# Patient Record
Sex: FEMALE | Race: WHITE | NOT HISPANIC OR LATINO | Employment: OTHER | URBAN - METROPOLITAN AREA
[De-identification: names, ages, dates, MRNs, and addresses within clinical notes are randomized per-mention and may not be internally consistent; named-entity substitution may affect disease eponyms.]

---

## 2017-03-08 DIAGNOSIS — I10 ESSENTIAL HYPERTENSION: ICD-10-CM

## 2017-03-08 RX ORDER — CARVEDILOL 6.25 MG/1
TABLET ORAL
Qty: 180 TABLET | Refills: 3 | Status: SHIPPED | OUTPATIENT
Start: 2017-03-08 | End: 2018-03-06 | Stop reason: SDUPTHER

## 2017-04-03 RX ORDER — AMLODIPINE BESYLATE 10 MG/1
TABLET ORAL
Qty: 90 TABLET | Refills: 0 | Status: SHIPPED | OUTPATIENT
Start: 2017-04-03 | End: 2017-06-27 | Stop reason: SDUPTHER

## 2017-06-27 RX ORDER — AMLODIPINE BESYLATE 10 MG/1
TABLET ORAL
Qty: 90 TABLET | Refills: 0 | Status: SHIPPED | OUTPATIENT
Start: 2017-06-27 | End: 2017-09-19 | Stop reason: SDUPTHER

## 2017-09-20 RX ORDER — AMLODIPINE BESYLATE 10 MG/1
TABLET ORAL
Qty: 90 TABLET | Refills: 3 | Status: SHIPPED | OUTPATIENT
Start: 2017-09-20 | End: 2018-10-10 | Stop reason: SDUPTHER

## 2017-10-25 ENCOUNTER — APPOINTMENT (OUTPATIENT)
Dept: WOMENS IMAGING | Facility: HOSPITAL | Age: 67
End: 2017-10-25

## 2017-10-25 PROCEDURE — MDREVIEWSP: Performed by: RADIOLOGY

## 2017-10-25 PROCEDURE — 77063 BREAST TOMOSYNTHESIS BI: CPT | Performed by: RADIOLOGY

## 2017-10-25 PROCEDURE — G0202 SCR MAMMO BI INCL CAD: HCPCS | Performed by: RADIOLOGY

## 2017-10-31 ENCOUNTER — OFFICE VISIT (OUTPATIENT)
Dept: CARDIOLOGY | Facility: CLINIC | Age: 67
End: 2017-10-31

## 2017-10-31 VITALS
OXYGEN SATURATION: 100 % | BODY MASS INDEX: 41.32 KG/M2 | SYSTOLIC BLOOD PRESSURE: 160 MMHG | HEART RATE: 85 BPM | HEIGHT: 65 IN | WEIGHT: 248 LBS | DIASTOLIC BLOOD PRESSURE: 90 MMHG

## 2017-10-31 DIAGNOSIS — I10 ESSENTIAL HYPERTENSION: Primary | ICD-10-CM

## 2017-10-31 DIAGNOSIS — R09.89 BRUIT OF RIGHT CAROTID ARTERY: ICD-10-CM

## 2017-10-31 PROCEDURE — 99214 OFFICE O/P EST MOD 30 MIN: CPT | Performed by: PHYSICIAN ASSISTANT

## 2017-10-31 PROCEDURE — 93000 ELECTROCARDIOGRAM COMPLETE: CPT | Performed by: PHYSICIAN ASSISTANT

## 2017-11-14 ENCOUNTER — HOSPITAL ENCOUNTER (OUTPATIENT)
Dept: CARDIOLOGY | Facility: HOSPITAL | Age: 67
Discharge: HOME | End: 2017-11-14
Admitting: PHYSICIAN ASSISTANT

## 2017-11-14 DIAGNOSIS — R09.89 BRUIT OF RIGHT CAROTID ARTERY: ICD-10-CM

## 2017-11-14 LAB
BH CV XLRA MEAS LEFT DIST CCA EDV: -21.2 CM/SEC
BH CV XLRA MEAS LEFT DIST CCA PSV: -90.4 CM/SEC
BH CV XLRA MEAS LEFT DIST ICA EDV: -30.6 CM/SEC
BH CV XLRA MEAS LEFT DIST ICA PSV: -107 CM/SEC
BH CV XLRA MEAS LEFT ICA/CCA RATIO: 1.33
BH CV XLRA MEAS LEFT MID CCA EDV: -18.1 CM/SEC
BH CV XLRA MEAS LEFT MID CCA PSV: -73.9 CM/SEC
BH CV XLRA MEAS LEFT MID ICA EDV: -22 CM/SEC
BH CV XLRA MEAS LEFT MID ICA PSV: -105 CM/SEC
BH CV XLRA MEAS LEFT PROX ECA PSV: -105 CM/SEC
BH CV XLRA MEAS LEFT PROX ICA EDV: 27.5 CM/SEC
BH CV XLRA MEAS LEFT PROX ICA PSV: 120 CM/SEC
BH CV XLRA MEAS LEFT PROX SCLA PSV: 320 CM/SEC
BH CV XLRA MEAS RIGHT DIST CCA EDV: -21.2 CM/SEC
BH CV XLRA MEAS RIGHT DIST CCA PSV: -87.2 CM/SEC
BH CV XLRA MEAS RIGHT DIST ICA EDV: -28.3 CM/SEC
BH CV XLRA MEAS RIGHT DIST ICA PSV: -105 CM/SEC
BH CV XLRA MEAS RIGHT ICA/CCA RATIO: 1.2
BH CV XLRA MEAS RIGHT MID CCA EDV: -19.6 CM/SEC
BH CV XLRA MEAS RIGHT MID CCA PSV: -105 CM/SEC
BH CV XLRA MEAS RIGHT MID ICA EDV: -18.9 CM/SEC
BH CV XLRA MEAS RIGHT MID ICA PSV: -67.6 CM/SEC
BH CV XLRA MEAS RIGHT PROX ECA PSV: -111 CM/SEC
BH CV XLRA MEAS RIGHT PROX ICA EDV: 26.7 CM/SEC
BH CV XLRA MEAS RIGHT PROX ICA PSV: 92.7 CM/SEC
BH CV XLRA MEAS RIGHT PROX SCLA PSV: 152 CM/SEC

## 2017-11-14 PROCEDURE — 93880 EXTRACRANIAL BILAT STUDY: CPT

## 2017-11-14 PROCEDURE — 93880 EXTRACRANIAL BILAT STUDY: CPT | Performed by: INTERNAL MEDICINE

## 2017-11-16 ENCOUNTER — TELEPHONE (OUTPATIENT)
Dept: CARDIOLOGY | Facility: CLINIC | Age: 67
End: 2017-11-16

## 2018-03-06 DIAGNOSIS — I10 ESSENTIAL HYPERTENSION: ICD-10-CM

## 2018-03-06 RX ORDER — CARVEDILOL 6.25 MG/1
TABLET ORAL
Qty: 180 TABLET | Refills: 0 | Status: SHIPPED | OUTPATIENT
Start: 2018-03-06 | End: 2018-06-01 | Stop reason: SDUPTHER

## 2018-06-01 DIAGNOSIS — I10 ESSENTIAL HYPERTENSION: ICD-10-CM

## 2018-06-01 RX ORDER — CARVEDILOL 6.25 MG/1
TABLET ORAL
Qty: 180 TABLET | Refills: 0 | Status: SHIPPED | OUTPATIENT
Start: 2018-06-01 | End: 2018-09-16 | Stop reason: SDUPTHER

## 2018-09-16 DIAGNOSIS — I10 ESSENTIAL HYPERTENSION: ICD-10-CM

## 2018-09-17 RX ORDER — CARVEDILOL 6.25 MG/1
TABLET ORAL
Qty: 180 TABLET | Refills: 0 | Status: SHIPPED | OUTPATIENT
Start: 2018-09-17 | End: 2018-12-15 | Stop reason: SDUPTHER

## 2018-10-10 RX ORDER — AMLODIPINE BESYLATE 10 MG/1
TABLET ORAL
Qty: 90 TABLET | Refills: 3 | Status: SHIPPED | OUTPATIENT
Start: 2018-10-10 | End: 2019-08-28 | Stop reason: HOSPADM

## 2018-10-26 ENCOUNTER — APPOINTMENT (OUTPATIENT)
Dept: WOMENS IMAGING | Facility: HOSPITAL | Age: 68
End: 2018-10-26

## 2018-10-26 PROCEDURE — 77067 SCR MAMMO BI INCL CAD: CPT | Performed by: RADIOLOGY

## 2018-10-26 PROCEDURE — 77063 BREAST TOMOSYNTHESIS BI: CPT | Performed by: RADIOLOGY

## 2018-10-26 PROCEDURE — MDREVIEWSP: Performed by: RADIOLOGY

## 2018-11-02 ENCOUNTER — OFFICE VISIT (OUTPATIENT)
Dept: CARDIOLOGY | Facility: CLINIC | Age: 68
End: 2018-11-02

## 2018-11-02 VITALS
WEIGHT: 239.8 LBS | DIASTOLIC BLOOD PRESSURE: 86 MMHG | HEART RATE: 72 BPM | HEIGHT: 65 IN | BODY MASS INDEX: 39.95 KG/M2 | SYSTOLIC BLOOD PRESSURE: 170 MMHG

## 2018-11-02 DIAGNOSIS — I10 ESSENTIAL HYPERTENSION: Primary | ICD-10-CM

## 2018-11-02 DIAGNOSIS — E66.01 MORBID OBESITY WITH BMI OF 40.0-44.9, ADULT (HCC): ICD-10-CM

## 2018-11-02 PROCEDURE — 99213 OFFICE O/P EST LOW 20 MIN: CPT | Performed by: INTERNAL MEDICINE

## 2018-11-02 PROCEDURE — 93000 ELECTROCARDIOGRAM COMPLETE: CPT | Performed by: INTERNAL MEDICINE

## 2018-11-02 RX ORDER — OLMESARTAN MEDOXOMIL 40 MG/1
40 TABLET ORAL DAILY
COMMUNITY
End: 2019-08-28 | Stop reason: HOSPADM

## 2018-11-02 NOTE — PROGRESS NOTES
Date of Office Visit: 2018  Encounter Provider: Harjit Farah MD  Place of Service: [facility] CARDIOLOGY  Patient Name: Baltazar MELO  :1950  [id number]    Chief Complaint   Patient presents with   • Hypertension   :     HPI: Baltazar MELO is a 68 y.o. female  She has a lot of family stress going on with a lot of illness. No chest pain, shortness of breath, PND, orthopnea, edema. No syncope or palpitations. She is not doing great with her salt intake. She is not doing great with her exercise, but when she does things, she is not limited.        Past Medical History:   Diagnosis Date   • Diabetes mellitus (CMS/[facility])    • Health care maintenance    • Hyperlipidemia    • Hypertension    • Obesity    • PVC (premature ventricular contraction)        No past surgical history on file.    Social History     Social History   • Marital status: Unknown     Spouse name: N/A   • Number of children: N/A   • Years of education: N/A     Occupational History   • Not on file.     Social History Main Topics   • Smoking status: Never Smoker   • Smokeless tobacco: Never Used      Comment: no caffine use   • Alcohol use No   • Drug use: No   • Sexual activity: Defer     Other Topics Concern   • Not on file     Social History Narrative   • No narrative on file       Family History   Problem Relation Age of Onset   • Heart disease Father    • Cancer Mother    • No Known Problems Maternal Grandmother    • No Known Problems Maternal Grandfather    • No Known Problems Paternal Grandmother    • No Known Problems Paternal Grandfather        Review of Systems   Constitution: Negative for decreased appetite, fever, malaise/fatigue and weight loss.   HENT: Negative for nosebleeds.    Eyes: Negative for double vision.   Cardiovascular: Negative for chest pain, claudication, cyanosis, dyspnea on exertion, irregular heartbeat, leg swelling, near-syncope, orthopnea, palpitations, paroxysmal nocturnal  "dyspnea and syncope.   Respiratory: Negative for cough, hemoptysis and shortness of breath.    Hematologic/Lymphatic: Negative for bleeding problem.   Skin: Negative for rash.   Musculoskeletal: Negative for falls and myalgias.   Gastrointestinal: Negative for hematochezia, jaundice, melena, nausea and vomiting.   Genitourinary: Negative for hematuria.   Neurological: Negative for dizziness and seizures.   Psychiatric/Behavioral: Negative for altered mental status and memory loss.       Allergies   Allergen Reactions   • Adhesive Tape    • Cefaclor    • Ibuprofen    • Penicillins    • Latex Rash         Current Outpatient Prescriptions:   •  amLODIPine (NORVASC) 10 MG tablet, TAKE 1 TABLET BY MOUTH EVERY DAY, Disp: 90 tablet, Rfl: 3  •  aspirin 81 MG tablet, Take 1 tablet by mouth daily., Disp: , Rfl:   •  atorvastatin (LIPITOR) 10 MG tablet, Take 1 tablet by mouth daily., Disp: , Rfl:   •  BIOTIN PO, Take 1 tablet by mouth Daily., Disp: , Rfl:   •  carvedilol (COREG) 6.25 MG tablet, TAKE 1 TABLET BY MOUTH TWICE DAILY, Disp: 180 tablet, Rfl: 0  •  chlorthalidone (HYGROTON) 25 MG tablet, TAKE 1 TABLET BY MOUTH EVERY MORNING, Disp: 90 tablet, Rfl: 3  •  fluticasone (FLONASE) 50 MCG/ACT nasal spray, 1 spray into each nostril Daily., Disp: , Rfl:   •  Multiple Vitamins-Minerals (MULTIVITAMIN PO), Take 1 tablet by mouth daily., Disp: , Rfl:   •  olmesartan (BENICAR) 40 MG tablet, Take 40 mg by mouth Daily., Disp: , Rfl:   •  ONETOUCH DELICA LANCETS 33G misc, USE AS DIRECTED TO CHECK BLOOD SUGAR ONCE DAILY, Disp: , Rfl:   •  pioglitazone (ACTOS) 15 MG tablet, Take 1 tablet by mouth daily., Disp: , Rfl:       Objective:     Vitals:    11/02/18 1134   BP: 170/86   Pulse: 72   Weight: 109 kg (239 lb 12.8 oz)   Height: 165.1 cm (65\")     Body mass index is 39.9 kg/m².    Physical Exam   Constitutional: She is oriented to person, place, and time. She appears well-developed and well-nourished.   HENT:   Head: Normocephalic. "   Eyes: No scleral icterus.   Neck: No JVD present. No thyromegaly present.   Cardiovascular: Normal rate, regular rhythm and normal heart sounds.  Exam reveals no gallop and no friction rub.    No murmur heard.  Pulmonary/Chest: Effort normal and breath sounds normal. She has no wheezes. She has no rales.   Abdominal: Soft. There is no hepatosplenomegaly. There is no tenderness.   Musculoskeletal: Normal range of motion. She exhibits no edema.   Lymphadenopathy:     She has no cervical adenopathy.   Neurological: She is alert and oriented to person, place, and time.   Skin: Skin is warm and dry. No rash noted.   Psychiatric: She has a normal mood and affect.         ECG 12 Lead  Date/Time: 11/2/2018 12:18 PM  Performed by: IRISH FARAH  Authorized by: IRISH FARAH   Comparison: compared with previous ECG   Similar to previous ECG  Rhythm: sinus rhythm  Ectopy: atrial premature contractions  Clinical impression: abnormal ECG             Assessment:       Diagnosis Plan   1. Essential hypertension     2. Morbid obesity with BMI of 40.0-44.9, adult (CMS/[facility])            Plan:       I think that she is doing okay. Her blood pressure is high here today, but she assures me it is better at home. I have encouraged her to cut her salt intake down and to take her medicines and to try and do some regular activity. I will have her come back and see Hemalatha Rodriguez PA-C, in a year and see me in two. If her blood pressure stays high, probably I would add an alpha blocker to her regimen.        As always, it has been a pleasure to participate in your patient's care.      Sincerely,       Irish Farah MD

## 2018-12-15 DIAGNOSIS — I10 ESSENTIAL HYPERTENSION: ICD-10-CM

## 2018-12-17 RX ORDER — CARVEDILOL 6.25 MG/1
TABLET ORAL
Qty: 180 TABLET | Refills: 0 | Status: SHIPPED | OUTPATIENT
Start: 2018-12-17 | End: 2019-03-12 | Stop reason: SDUPTHER

## 2019-03-12 DIAGNOSIS — I10 ESSENTIAL HYPERTENSION: ICD-10-CM

## 2019-03-12 RX ORDER — CARVEDILOL 6.25 MG/1
TABLET ORAL
Qty: 180 TABLET | Refills: 0 | Status: SHIPPED | OUTPATIENT
Start: 2019-03-12 | End: 2019-06-26 | Stop reason: SDUPTHER

## 2019-06-26 DIAGNOSIS — I10 ESSENTIAL HYPERTENSION: ICD-10-CM

## 2019-06-26 RX ORDER — CARVEDILOL 6.25 MG/1
TABLET ORAL
Qty: 180 TABLET | Refills: 0 | Status: SHIPPED | OUTPATIENT
Start: 2019-06-26 | End: 2019-09-29 | Stop reason: SDUPTHER

## 2019-08-12 ENCOUNTER — APPOINTMENT (OUTPATIENT)
Dept: CT IMAGING | Facility: HOSPITAL | Age: 69
End: 2019-08-12

## 2019-08-12 ENCOUNTER — APPOINTMENT (OUTPATIENT)
Dept: MRI IMAGING | Facility: HOSPITAL | Age: 69
End: 2019-08-12

## 2019-08-12 ENCOUNTER — HOSPITAL ENCOUNTER (INPATIENT)
Facility: HOSPITAL | Age: 69
LOS: 16 days | Discharge: NURSING, REHAB OR LONG-TERM CARE | End: 2019-08-28
Attending: EMERGENCY MEDICINE | Admitting: NEUROLOGICAL SURGERY

## 2019-08-12 ENCOUNTER — APPOINTMENT (OUTPATIENT)
Dept: CARDIOLOGY | Facility: HOSPITAL | Age: 69
End: 2019-08-12

## 2019-08-12 DIAGNOSIS — R39.9 ABNORMAL FINDING OF KIDNEY: ICD-10-CM

## 2019-08-12 DIAGNOSIS — I48.91 NEW ONSET ATRIAL FIBRILLATION (HCC): ICD-10-CM

## 2019-08-12 DIAGNOSIS — M54.50 ACUTE LEFT-SIDED LOW BACK PAIN WITHOUT SCIATICA: ICD-10-CM

## 2019-08-12 DIAGNOSIS — M54.16 LUMBAR BACK PAIN WITH RADICULOPATHY AFFECTING RIGHT LOWER EXTREMITY: Primary | ICD-10-CM

## 2019-08-12 DIAGNOSIS — R26.2 INABILITY TO WALK: ICD-10-CM

## 2019-08-12 LAB
ALBUMIN SERPL-MCNC: 4.4 G/DL (ref 3.5–5.2)
ALBUMIN/GLOB SERPL: 1.2 G/DL
ALP SERPL-CCNC: 125 U/L (ref 39–117)
ALT SERPL W P-5'-P-CCNC: 27 U/L (ref 1–33)
ANION GAP SERPL CALCULATED.3IONS-SCNC: 12.5 MMOL/L (ref 5–15)
AST SERPL-CCNC: 25 U/L (ref 1–32)
BASOPHILS # BLD AUTO: 0.02 10*3/MM3 (ref 0–0.2)
BASOPHILS NFR BLD AUTO: 0.4 % (ref 0–1.5)
BILIRUB SERPL-MCNC: 0.4 MG/DL (ref 0.2–1.2)
BUN BLD-MCNC: 45 MG/DL (ref 8–23)
BUN/CREAT SERPL: 23.7 (ref 7–25)
CALCIUM SPEC-SCNC: 9.6 MG/DL (ref 8.6–10.5)
CHLORIDE SERPL-SCNC: 92 MMOL/L (ref 98–107)
CO2 SERPL-SCNC: 25.5 MMOL/L (ref 22–29)
CREAT BLD-MCNC: 1.9 MG/DL (ref 0.57–1)
DEPRECATED RDW RBC AUTO: 42.6 FL (ref 37–54)
EOSINOPHIL # BLD AUTO: 0.04 10*3/MM3 (ref 0–0.4)
EOSINOPHIL NFR BLD AUTO: 0.8 % (ref 0.3–6.2)
ERYTHROCYTE [DISTWIDTH] IN BLOOD BY AUTOMATED COUNT: 14.4 % (ref 12.3–15.4)
GFR SERPL CREATININE-BSD FRML MDRD: 26 ML/MIN/1.73
GLOBULIN UR ELPH-MCNC: 3.8 GM/DL
GLUCOSE BLD-MCNC: 137 MG/DL (ref 65–99)
HCT VFR BLD AUTO: 43.1 % (ref 34–46.6)
HGB BLD-MCNC: 12.9 G/DL (ref 12–15.9)
HOLD SPECIMEN: NORMAL
HOLD SPECIMEN: NORMAL
IMM GRANULOCYTES # BLD AUTO: 0.02 10*3/MM3 (ref 0–0.05)
IMM GRANULOCYTES NFR BLD AUTO: 0.4 % (ref 0–0.5)
INR PPP: 1.06 (ref 0.9–1.1)
LYMPHOCYTES # BLD AUTO: 0.75 10*3/MM3 (ref 0.7–3.1)
LYMPHOCYTES NFR BLD AUTO: 15 % (ref 19.6–45.3)
MCH RBC QN AUTO: 24.5 PG (ref 26.6–33)
MCHC RBC AUTO-ENTMCNC: 29.9 G/DL (ref 31.5–35.7)
MCV RBC AUTO: 81.9 FL (ref 79–97)
MONOCYTES # BLD AUTO: 0.65 10*3/MM3 (ref 0.1–0.9)
MONOCYTES NFR BLD AUTO: 13 % (ref 5–12)
NEUTROPHILS # BLD AUTO: 3.52 10*3/MM3 (ref 1.7–7)
NEUTROPHILS NFR BLD AUTO: 70.4 % (ref 42.7–76)
NRBC BLD AUTO-RTO: 0 /100 WBC (ref 0–0.2)
PLATELET # BLD AUTO: 324 10*3/MM3 (ref 140–450)
PMV BLD AUTO: 11.3 FL (ref 6–12)
POTASSIUM BLD-SCNC: 4 MMOL/L (ref 3.5–5.2)
PROT SERPL-MCNC: 8.2 G/DL (ref 6–8.5)
PROTHROMBIN TIME: 13.6 SECONDS (ref 11.7–14.2)
RBC # BLD AUTO: 5.26 10*6/MM3 (ref 3.77–5.28)
SODIUM BLD-SCNC: 130 MMOL/L (ref 136–145)
WBC NRBC COR # BLD: 5 10*3/MM3 (ref 3.4–10.8)
WHOLE BLOOD HOLD SPECIMEN: NORMAL
WHOLE BLOOD HOLD SPECIMEN: NORMAL

## 2019-08-12 PROCEDURE — 72146 MRI CHEST SPINE W/O DYE: CPT

## 2019-08-12 PROCEDURE — 72131 CT LUMBAR SPINE W/O DYE: CPT

## 2019-08-12 PROCEDURE — 80053 COMPREHEN METABOLIC PANEL: CPT | Performed by: NURSE PRACTITIONER

## 2019-08-12 PROCEDURE — 72148 MRI LUMBAR SPINE W/O DYE: CPT

## 2019-08-12 PROCEDURE — 25010000002 HYDROMORPHONE PER 4 MG: Performed by: NURSE PRACTITIONER

## 2019-08-12 PROCEDURE — 85610 PROTHROMBIN TIME: CPT | Performed by: NURSE PRACTITIONER

## 2019-08-12 PROCEDURE — 93010 ELECTROCARDIOGRAM REPORT: CPT | Performed by: INTERNAL MEDICINE

## 2019-08-12 PROCEDURE — 93005 ELECTROCARDIOGRAM TRACING: CPT | Performed by: NURSE PRACTITIONER

## 2019-08-12 PROCEDURE — 25010000002 ONDANSETRON PER 1 MG: Performed by: NURSE PRACTITIONER

## 2019-08-12 PROCEDURE — 25010000002 METHYLPREDNISOLONE PER 125 MG: Performed by: NURSE PRACTITIONER

## 2019-08-12 PROCEDURE — 99284 EMERGENCY DEPT VISIT MOD MDM: CPT

## 2019-08-12 PROCEDURE — 85025 COMPLETE CBC W/AUTO DIFF WBC: CPT | Performed by: NURSE PRACTITIONER

## 2019-08-12 PROCEDURE — 0296T HC EXT ECG > 48HR TO 21 DAY RCRD W/CONECT INTL RCRD: CPT

## 2019-08-12 RX ORDER — LANOLIN ALCOHOL/MO/W.PET/CERES
1000 CREAM (GRAM) TOPICAL DAILY
COMMUNITY

## 2019-08-12 RX ORDER — HYDROMORPHONE HYDROCHLORIDE 1 MG/ML
0.5 INJECTION, SOLUTION INTRAMUSCULAR; INTRAVENOUS; SUBCUTANEOUS ONCE
Status: COMPLETED | OUTPATIENT
Start: 2019-08-12 | End: 2019-08-12

## 2019-08-12 RX ORDER — SODIUM CHLORIDE 0.9 % (FLUSH) 0.9 %
10 SYRINGE (ML) INJECTION AS NEEDED
Status: DISCONTINUED | OUTPATIENT
Start: 2019-08-12 | End: 2019-08-28 | Stop reason: HOSPADM

## 2019-08-12 RX ORDER — TRAMADOL HYDROCHLORIDE 50 MG/1
50 TABLET ORAL EVERY 6 HOURS PRN
COMMUNITY
End: 2019-08-28 | Stop reason: HOSPADM

## 2019-08-12 RX ORDER — ACETAMINOPHEN AND CODEINE PHOSPHATE 300; 30 MG/1; MG/1
1 TABLET ORAL EVERY 4 HOURS PRN
COMMUNITY
End: 2019-08-28 | Stop reason: HOSPADM

## 2019-08-12 RX ORDER — ONDANSETRON 2 MG/ML
4 INJECTION INTRAMUSCULAR; INTRAVENOUS ONCE
Status: COMPLETED | OUTPATIENT
Start: 2019-08-12 | End: 2019-08-12

## 2019-08-12 RX ORDER — CYCLOBENZAPRINE HCL 10 MG
10 TABLET ORAL 3 TIMES DAILY PRN
COMMUNITY
End: 2020-05-20

## 2019-08-12 RX ORDER — DIAZEPAM 5 MG/1
5 TABLET ORAL ONCE
Status: COMPLETED | OUTPATIENT
Start: 2019-08-12 | End: 2019-08-12

## 2019-08-12 RX ORDER — FOLIC ACID 5 MG
CAPSULE ORAL EVERY OTHER DAY
COMMUNITY

## 2019-08-12 RX ORDER — METHYLPREDNISOLONE SODIUM SUCCINATE 125 MG/2ML
125 INJECTION, POWDER, LYOPHILIZED, FOR SOLUTION INTRAMUSCULAR; INTRAVENOUS ONCE
Status: COMPLETED | OUTPATIENT
Start: 2019-08-12 | End: 2019-08-12

## 2019-08-12 RX ADMIN — HYDROMORPHONE HYDROCHLORIDE 0.5 MG: 1 INJECTION, SOLUTION INTRAMUSCULAR; INTRAVENOUS; SUBCUTANEOUS at 18:30

## 2019-08-12 RX ADMIN — ONDANSETRON 4 MG: 2 INJECTION INTRAMUSCULAR; INTRAVENOUS at 18:30

## 2019-08-12 RX ADMIN — DIAZEPAM 5 MG: 5 TABLET ORAL at 19:45

## 2019-08-12 RX ADMIN — SODIUM CHLORIDE, PRESERVATIVE FREE 10 ML: 5 INJECTION INTRAVENOUS at 18:36

## 2019-08-12 RX ADMIN — SODIUM CHLORIDE 500 ML: 9 INJECTION, SOLUTION INTRAVENOUS at 18:36

## 2019-08-12 RX ADMIN — METHYLPREDNISOLONE SODIUM SUCCINATE 125 MG: 125 INJECTION, POWDER, FOR SOLUTION INTRAMUSCULAR; INTRAVENOUS at 18:34

## 2019-08-12 RX ADMIN — HYDROMORPHONE HYDROCHLORIDE 0.5 MG: 1 INJECTION, SOLUTION INTRAMUSCULAR; INTRAVENOUS; SUBCUTANEOUS at 19:45

## 2019-08-13 ENCOUNTER — APPOINTMENT (OUTPATIENT)
Dept: GENERAL RADIOLOGY | Facility: HOSPITAL | Age: 69
End: 2019-08-13

## 2019-08-13 ENCOUNTER — APPOINTMENT (OUTPATIENT)
Dept: CARDIOLOGY | Facility: HOSPITAL | Age: 69
End: 2019-08-13

## 2019-08-13 PROBLEM — N28.9 RENAL LESION: Status: ACTIVE | Noted: 2019-08-13

## 2019-08-13 PROBLEM — N17.9 ACUTE KIDNEY INJURY SUPERIMPOSED ON CHRONIC KIDNEY DISEASE (HCC): Status: ACTIVE | Noted: 2019-08-13

## 2019-08-13 PROBLEM — E11.9 DIABETES MELLITUS (HCC): Status: ACTIVE | Noted: 2019-08-13

## 2019-08-13 PROBLEM — N18.9 ACUTE KIDNEY INJURY SUPERIMPOSED ON CHRONIC KIDNEY DISEASE (HCC): Status: ACTIVE | Noted: 2019-08-13

## 2019-08-13 PROBLEM — I48.91 NEW ONSET A-FIB (HCC): Status: ACTIVE | Noted: 2019-08-13

## 2019-08-13 PROBLEM — M54.16 LUMBAR BACK PAIN WITH RADICULOPATHY AFFECTING RIGHT LOWER EXTREMITY: Status: ACTIVE | Noted: 2019-08-13

## 2019-08-13 LAB
ANION GAP SERPL CALCULATED.3IONS-SCNC: 17 MMOL/L (ref 5–15)
ASCENDING AORTA: 2.6 CM
BH CV ECHO MEAS - ACS: 1.7 CM
BH CV ECHO MEAS - AO MAX PG: 29 MMHG
BH CV ECHO MEAS - AO MEAN PG (FULL): 12 MMHG
BH CV ECHO MEAS - AO MEAN PG: 15 MMHG
BH CV ECHO MEAS - AO ROOT AREA (BSA CORRECTED): 1.2
BH CV ECHO MEAS - AO ROOT AREA: 5.3 CM^2
BH CV ECHO MEAS - AO ROOT DIAM: 2.6 CM
BH CV ECHO MEAS - AO V2 MAX: 269 CM/SEC
BH CV ECHO MEAS - AO V2 MEAN: 179 CM/SEC
BH CV ECHO MEAS - AO V2 VTI: 52.8 CM
BH CV ECHO MEAS - AVA(I,A): 1.3 CM^2
BH CV ECHO MEAS - AVA(I,D): 1.3 CM^2
BH CV ECHO MEAS - BSA(HAYCOCK): 2.3 M^2
BH CV ECHO MEAS - BSA: 2.1 M^2
BH CV ECHO MEAS - BZI_BMI: 39.9 KILOGRAMS/M^2
BH CV ECHO MEAS - BZI_METRIC_HEIGHT: 165.1 CM
BH CV ECHO MEAS - BZI_METRIC_WEIGHT: 108.9 KG
BH CV ECHO MEAS - EDV(MOD-SP2): 98 ML
BH CV ECHO MEAS - EDV(MOD-SP4): 79 ML
BH CV ECHO MEAS - EDV(TEICH): 135.3 ML
BH CV ECHO MEAS - EF(CUBED): 71.2 %
BH CV ECHO MEAS - EF(MOD-BP): 61 %
BH CV ECHO MEAS - EF(MOD-SP2): 70.4 %
BH CV ECHO MEAS - EF(MOD-SP4): 50.6 %
BH CV ECHO MEAS - EF(TEICH): 62.4 %
BH CV ECHO MEAS - ESV(MOD-SP2): 29 ML
BH CV ECHO MEAS - ESV(MOD-SP4): 39 ML
BH CV ECHO MEAS - ESV(TEICH): 50.9 ML
BH CV ECHO MEAS - FS: 34 %
BH CV ECHO MEAS - IVS/LVPW: 0.9
BH CV ECHO MEAS - IVSD: 0.9 CM
BH CV ECHO MEAS - LAT PEAK E' VEL: 9 CM/SEC
BH CV ECHO MEAS - LV DIASTOLIC VOL/BSA (35-75): 37 ML/M^2
BH CV ECHO MEAS - LV MASS(C)D: 187.3 GRAMS
BH CV ECHO MEAS - LV MASS(C)DI: 87.6 GRAMS/M^2
BH CV ECHO MEAS - LV MEAN PG: 3 MMHG
BH CV ECHO MEAS - LV SYSTOLIC VOL/BSA (12-30): 18.2 ML/M^2
BH CV ECHO MEAS - LV V1 MAX: 129 CM/SEC
BH CV ECHO MEAS - LV V1 MEAN: 82.6 CM/SEC
BH CV ECHO MEAS - LV V1 VTI: 26.9 CM
BH CV ECHO MEAS - LVIDD: 5.3 CM
BH CV ECHO MEAS - LVIDS: 3.5 CM
BH CV ECHO MEAS - LVLD AP2: 7.2 CM
BH CV ECHO MEAS - LVLD AP4: 6.8 CM
BH CV ECHO MEAS - LVLS AP2: 6.3 CM
BH CV ECHO MEAS - LVLS AP4: 6.1 CM
BH CV ECHO MEAS - LVOT AREA (M): 2.5 CM^2
BH CV ECHO MEAS - LVOT AREA: 2.5 CM^2
BH CV ECHO MEAS - LVOT DIAM: 1.8 CM
BH CV ECHO MEAS - LVPWD: 1 CM
BH CV ECHO MEAS - MED PEAK E' VEL: 6 CM/SEC
BH CV ECHO MEAS - MR MAX PG: 94.5 MMHG
BH CV ECHO MEAS - MR MAX VEL: 486 CM/SEC
BH CV ECHO MEAS - MV DEC SLOPE: 387 CM/SEC^2
BH CV ECHO MEAS - MV DEC TIME: 0.15 SEC
BH CV ECHO MEAS - MV E MAX VEL: 123 CM/SEC
BH CV ECHO MEAS - MV MEAN PG: 3 MMHG
BH CV ECHO MEAS - MV P1/2T MAX VEL: 142 CM/SEC
BH CV ECHO MEAS - MV P1/2T: 107.5 MSEC
BH CV ECHO MEAS - MV V2 MEAN: 81.4 CM/SEC
BH CV ECHO MEAS - MV V2 VTI: 31.7 CM
BH CV ECHO MEAS - MVA P1/2T LCG: 1.5 CM^2
BH CV ECHO MEAS - MVA(P1/2T): 2 CM^2
BH CV ECHO MEAS - MVA(VTI): 2.2 CM^2
BH CV ECHO MEAS - PA MAX PG (FULL): 2.7 MMHG
BH CV ECHO MEAS - PA MAX PG: 4.9 MMHG
BH CV ECHO MEAS - PA V2 MAX: 111 CM/SEC
BH CV ECHO MEAS - PVA(V,A): 3.6 CM^2
BH CV ECHO MEAS - PVA(V,D): 3.6 CM^2
BH CV ECHO MEAS - QP/QS: 1.2
BH CV ECHO MEAS - RAP SYSTOLE: 3 MMHG
BH CV ECHO MEAS - RV MAX PG: 2.2 MMHG
BH CV ECHO MEAS - RV MEAN PG: 1 MMHG
BH CV ECHO MEAS - RV V1 MAX: 74.4 CM/SEC
BH CV ECHO MEAS - RV V1 MEAN: 51 CM/SEC
BH CV ECHO MEAS - RV V1 VTI: 15 CM
BH CV ECHO MEAS - RVOT AREA: 5.3 CM^2
BH CV ECHO MEAS - RVOT DIAM: 2.6 CM
BH CV ECHO MEAS - RVSP: 35 MMHG
BH CV ECHO MEAS - SI(AO): 131.1 ML/M^2
BH CV ECHO MEAS - SI(CUBED): 49.6 ML/M^2
BH CV ECHO MEAS - SI(LVOT): 32 ML/M^2
BH CV ECHO MEAS - SI(MOD-SP2): 32.3 ML/M^2
BH CV ECHO MEAS - SI(MOD-SP4): 18.7 ML/M^2
BH CV ECHO MEAS - SI(TEICH): 39.5 ML/M^2
BH CV ECHO MEAS - SV(AO): 280.3 ML
BH CV ECHO MEAS - SV(CUBED): 106 ML
BH CV ECHO MEAS - SV(LVOT): 68.5 ML
BH CV ECHO MEAS - SV(MOD-SP2): 69 ML
BH CV ECHO MEAS - SV(MOD-SP4): 40 ML
BH CV ECHO MEAS - SV(RVOT): 79.6 ML
BH CV ECHO MEAS - SV(TEICH): 84.5 ML
BH CV ECHO MEAS - TAPSE (>1.6): 1.9 CM2
BH CV ECHO MEAS - TR MAX VEL: 278 CM/SEC
BH CV ECHO MEASUREMENTS AVERAGE E/E' RATIO: 16.4
BH CV XLRA - RV BASE: 3.4 CM
BH CV XLRA - TDI S': 12 CM/SEC
BILIRUB UR QL STRIP: NEGATIVE
BUN BLD-MCNC: 54 MG/DL (ref 8–23)
BUN/CREAT SERPL: 25 (ref 7–25)
CALCIUM SPEC-SCNC: 9 MG/DL (ref 8.6–10.5)
CHLORIDE SERPL-SCNC: 98 MMOL/L (ref 98–107)
CLARITY UR: CLEAR
CO2 SERPL-SCNC: 20 MMOL/L (ref 22–29)
COLOR UR: YELLOW
CREAT BLD-MCNC: 2.16 MG/DL (ref 0.57–1)
DEPRECATED RDW RBC AUTO: 41.3 FL (ref 37–54)
ERYTHROCYTE [DISTWIDTH] IN BLOOD BY AUTOMATED COUNT: 13.9 % (ref 12.3–15.4)
GFR SERPL CREATININE-BSD FRML MDRD: 23 ML/MIN/1.73
GLUCOSE BLD-MCNC: 206 MG/DL (ref 65–99)
GLUCOSE BLDC GLUCOMTR-MCNC: 175 MG/DL (ref 70–130)
GLUCOSE BLDC GLUCOMTR-MCNC: 185 MG/DL (ref 70–130)
GLUCOSE BLDC GLUCOMTR-MCNC: 196 MG/DL (ref 70–130)
GLUCOSE BLDC GLUCOMTR-MCNC: 211 MG/DL (ref 70–130)
GLUCOSE BLDC GLUCOMTR-MCNC: 222 MG/DL (ref 70–130)
GLUCOSE UR STRIP-MCNC: NEGATIVE MG/DL
HCT VFR BLD AUTO: 38.6 % (ref 34–46.6)
HGB BLD-MCNC: 11.7 G/DL (ref 12–15.9)
HGB UR QL STRIP.AUTO: NEGATIVE
KETONES UR QL STRIP: NEGATIVE
LEFT ATRIUM VOLUME INDEX: 19 ML/M2
LEUKOCYTE ESTERASE UR QL STRIP.AUTO: NEGATIVE
MAXIMAL PREDICTED HEART RATE: 153 BPM
MCH RBC QN AUTO: 24.8 PG (ref 26.6–33)
MCHC RBC AUTO-ENTMCNC: 30.3 G/DL (ref 31.5–35.7)
MCV RBC AUTO: 81.8 FL (ref 79–97)
NITRITE UR QL STRIP: NEGATIVE
PH UR STRIP.AUTO: <=5 [PH] (ref 5–8)
PLATELET # BLD AUTO: 292 10*3/MM3 (ref 140–450)
PMV BLD AUTO: 10.9 FL (ref 6–12)
POTASSIUM BLD-SCNC: 4.5 MMOL/L (ref 3.5–5.2)
PROT UR QL STRIP: NEGATIVE
RBC # BLD AUTO: 4.72 10*6/MM3 (ref 3.77–5.28)
SINUS: 2.7 CM
SODIUM BLD-SCNC: 135 MMOL/L (ref 136–145)
SP GR UR STRIP: 1.01 (ref 1–1.03)
STJ: 2.2 CM
STRESS TARGET HR: 130 BPM
UROBILINOGEN UR QL STRIP: NORMAL
WBC NRBC COR # BLD: 4.95 10*3/MM3 (ref 3.4–10.8)

## 2019-08-13 PROCEDURE — G0378 HOSPITAL OBSERVATION PER HR: HCPCS

## 2019-08-13 PROCEDURE — 82962 GLUCOSE BLOOD TEST: CPT

## 2019-08-13 PROCEDURE — 85027 COMPLETE CBC AUTOMATED: CPT | Performed by: NURSE PRACTITIONER

## 2019-08-13 PROCEDURE — 80048 BASIC METABOLIC PNL TOTAL CA: CPT | Performed by: NURSE PRACTITIONER

## 2019-08-13 PROCEDURE — 97162 PT EVAL MOD COMPLEX 30 MIN: CPT

## 2019-08-13 PROCEDURE — 93306 TTE W/DOPPLER COMPLETE: CPT

## 2019-08-13 PROCEDURE — 72114 X-RAY EXAM L-S SPINE BENDING: CPT

## 2019-08-13 PROCEDURE — 99204 OFFICE O/P NEW MOD 45 MIN: CPT | Performed by: NURSE PRACTITIONER

## 2019-08-13 PROCEDURE — 93306 TTE W/DOPPLER COMPLETE: CPT | Performed by: INTERNAL MEDICINE

## 2019-08-13 PROCEDURE — 25010000002 HYDROMORPHONE 1 MG/ML SOLUTION: Performed by: NURSE PRACTITIONER

## 2019-08-13 PROCEDURE — 97110 THERAPEUTIC EXERCISES: CPT

## 2019-08-13 PROCEDURE — 99214 OFFICE O/P EST MOD 30 MIN: CPT | Performed by: INTERNAL MEDICINE

## 2019-08-13 PROCEDURE — 81003 URINALYSIS AUTO W/O SCOPE: CPT | Performed by: NURSE PRACTITIONER

## 2019-08-13 PROCEDURE — 36415 COLL VENOUS BLD VENIPUNCTURE: CPT | Performed by: NURSE PRACTITIONER

## 2019-08-13 PROCEDURE — 63710000001 INSULIN LISPRO (HUMAN) PER 5 UNITS: Performed by: NURSE PRACTITIONER

## 2019-08-13 RX ORDER — PIOGLITAZONEHYDROCHLORIDE 15 MG/1
15 TABLET ORAL DAILY
Status: DISCONTINUED | OUTPATIENT
Start: 2019-08-13 | End: 2019-08-13

## 2019-08-13 RX ORDER — NICOTINE POLACRILEX 4 MG
15 LOZENGE BUCCAL
Status: DISCONTINUED | OUTPATIENT
Start: 2019-08-13 | End: 2019-08-28 | Stop reason: HOSPADM

## 2019-08-13 RX ORDER — FLUTICASONE PROPIONATE 50 MCG
1 SPRAY, SUSPENSION (ML) NASAL DAILY
Status: DISCONTINUED | OUTPATIENT
Start: 2019-08-13 | End: 2019-08-20

## 2019-08-13 RX ORDER — ACETAMINOPHEN 160 MG/5ML
650 SOLUTION ORAL EVERY 4 HOURS PRN
Status: DISCONTINUED | OUTPATIENT
Start: 2019-08-13 | End: 2019-08-28 | Stop reason: HOSPADM

## 2019-08-13 RX ORDER — MULTIPLE VITAMINS W/ MINERALS TAB 9MG-400MCG
1 TAB ORAL DAILY
Status: DISCONTINUED | OUTPATIENT
Start: 2019-08-13 | End: 2019-08-28 | Stop reason: HOSPADM

## 2019-08-13 RX ORDER — ASPIRIN 81 MG/1
81 TABLET ORAL DAILY
Status: DISCONTINUED | OUTPATIENT
Start: 2019-08-13 | End: 2019-08-13

## 2019-08-13 RX ORDER — AMLODIPINE BESYLATE 10 MG/1
10 TABLET ORAL DAILY
Status: DISCONTINUED | OUTPATIENT
Start: 2019-08-13 | End: 2019-08-15

## 2019-08-13 RX ORDER — SODIUM CHLORIDE 0.9 % (FLUSH) 0.9 %
3 SYRINGE (ML) INJECTION EVERY 12 HOURS SCHEDULED
Status: DISCONTINUED | OUTPATIENT
Start: 2019-08-13 | End: 2019-08-28 | Stop reason: HOSPADM

## 2019-08-13 RX ORDER — HYDROCODONE BITARTRATE AND ACETAMINOPHEN 5; 325 MG/1; MG/1
1 TABLET ORAL EVERY 6 HOURS PRN
Status: DISCONTINUED | OUTPATIENT
Start: 2019-08-13 | End: 2019-08-17

## 2019-08-13 RX ORDER — DOCUSATE SODIUM 100 MG/1
100 CAPSULE, LIQUID FILLED ORAL 2 TIMES DAILY
Status: DISCONTINUED | OUTPATIENT
Start: 2019-08-13 | End: 2019-08-15

## 2019-08-13 RX ORDER — CHOLECALCIFEROL (VITAMIN D3) 125 MCG
1000 CAPSULE ORAL DAILY
Status: DISCONTINUED | OUTPATIENT
Start: 2019-08-13 | End: 2019-08-28 | Stop reason: HOSPADM

## 2019-08-13 RX ORDER — CARVEDILOL 6.25 MG/1
6.25 TABLET ORAL 2 TIMES DAILY
Status: DISCONTINUED | OUTPATIENT
Start: 2019-08-13 | End: 2019-08-13

## 2019-08-13 RX ORDER — ACETAMINOPHEN 325 MG/1
650 TABLET ORAL EVERY 4 HOURS PRN
Status: DISCONTINUED | OUTPATIENT
Start: 2019-08-13 | End: 2019-08-28 | Stop reason: HOSPADM

## 2019-08-13 RX ORDER — SODIUM CHLORIDE 0.9 % (FLUSH) 0.9 %
1-10 SYRINGE (ML) INJECTION AS NEEDED
Status: DISCONTINUED | OUTPATIENT
Start: 2019-08-13 | End: 2019-08-28 | Stop reason: HOSPADM

## 2019-08-13 RX ORDER — ATORVASTATIN CALCIUM 10 MG/1
10 TABLET, FILM COATED ORAL DAILY
Status: DISCONTINUED | OUTPATIENT
Start: 2019-08-13 | End: 2019-08-28 | Stop reason: HOSPADM

## 2019-08-13 RX ORDER — ACETAMINOPHEN 650 MG/1
650 SUPPOSITORY RECTAL EVERY 4 HOURS PRN
Status: DISCONTINUED | OUTPATIENT
Start: 2019-08-13 | End: 2019-08-28 | Stop reason: HOSPADM

## 2019-08-13 RX ORDER — METHYLPREDNISOLONE 4 MG/1
TABLET ORAL
Qty: 21 EACH | Refills: 0 | Status: SHIPPED | OUTPATIENT
Start: 2019-08-13 | End: 2019-08-27 | Stop reason: HOSPADM

## 2019-08-13 RX ORDER — NITROGLYCERIN 0.4 MG/1
0.4 TABLET SUBLINGUAL
Status: DISCONTINUED | OUTPATIENT
Start: 2019-08-13 | End: 2019-08-28 | Stop reason: HOSPADM

## 2019-08-13 RX ORDER — DEXTROSE MONOHYDRATE 25 G/50ML
25 INJECTION, SOLUTION INTRAVENOUS
Status: DISCONTINUED | OUTPATIENT
Start: 2019-08-13 | End: 2019-08-28 | Stop reason: HOSPADM

## 2019-08-13 RX ORDER — HYDROMORPHONE HYDROCHLORIDE 1 MG/ML
0.5 INJECTION, SOLUTION INTRAMUSCULAR; INTRAVENOUS; SUBCUTANEOUS EVERY 4 HOURS PRN
Status: DISCONTINUED | OUTPATIENT
Start: 2019-08-13 | End: 2019-08-25

## 2019-08-13 RX ORDER — ONDANSETRON 2 MG/ML
4 INJECTION INTRAMUSCULAR; INTRAVENOUS EVERY 6 HOURS PRN
Status: DISCONTINUED | OUTPATIENT
Start: 2019-08-13 | End: 2019-08-28 | Stop reason: HOSPADM

## 2019-08-13 RX ORDER — CARVEDILOL 12.5 MG/1
12.5 TABLET ORAL 2 TIMES DAILY
Status: DISCONTINUED | OUTPATIENT
Start: 2019-08-13 | End: 2019-08-28 | Stop reason: HOSPADM

## 2019-08-13 RX ADMIN — DOCUSATE SODIUM 100 MG: 100 CAPSULE, LIQUID FILLED ORAL at 20:40

## 2019-08-13 RX ADMIN — ATORVASTATIN CALCIUM 10 MG: 10 TABLET, FILM COATED ORAL at 20:40

## 2019-08-13 RX ADMIN — HYDROMORPHONE HYDROCHLORIDE 1 MG: 1 INJECTION, SOLUTION INTRAMUSCULAR; INTRAVENOUS; SUBCUTANEOUS at 01:10

## 2019-08-13 RX ADMIN — SODIUM CHLORIDE, PRESERVATIVE FREE 3 ML: 5 INJECTION INTRAVENOUS at 09:13

## 2019-08-13 RX ADMIN — INSULIN LISPRO 4 UNITS: 100 INJECTION, SOLUTION INTRAVENOUS; SUBCUTANEOUS at 17:21

## 2019-08-13 RX ADMIN — CARVEDILOL 12.5 MG: 12.5 TABLET, FILM COATED ORAL at 20:40

## 2019-08-13 RX ADMIN — SODIUM CHLORIDE, PRESERVATIVE FREE 3 ML: 5 INJECTION INTRAVENOUS at 03:28

## 2019-08-13 RX ADMIN — DOCUSATE SODIUM 100 MG: 100 CAPSULE, LIQUID FILLED ORAL at 12:30

## 2019-08-13 RX ADMIN — HYDROCODONE BITARTRATE AND ACETAMINOPHEN 1 TABLET: 5; 325 TABLET ORAL at 20:40

## 2019-08-13 RX ADMIN — AMLODIPINE BESYLATE 10 MG: 10 TABLET ORAL at 16:00

## 2019-08-13 RX ADMIN — INSULIN LISPRO 2 UNITS: 100 INJECTION, SOLUTION INTRAVENOUS; SUBCUTANEOUS at 20:55

## 2019-08-14 LAB
ALBUMIN SERPL-MCNC: 3.5 G/DL (ref 3.5–5.2)
ANION GAP SERPL CALCULATED.3IONS-SCNC: 12.3 MMOL/L (ref 5–15)
BUN BLD-MCNC: 65 MG/DL (ref 8–23)
BUN/CREAT SERPL: 29.7 (ref 7–25)
CALCIUM SPEC-SCNC: 8.6 MG/DL (ref 8.6–10.5)
CHLORIDE SERPL-SCNC: 95 MMOL/L (ref 98–107)
CO2 SERPL-SCNC: 22.7 MMOL/L (ref 22–29)
CREAT BLD-MCNC: 2.19 MG/DL (ref 0.57–1)
GFR SERPL CREATININE-BSD FRML MDRD: 22 ML/MIN/1.73
GLUCOSE BLD-MCNC: 157 MG/DL (ref 65–99)
GLUCOSE BLDC GLUCOMTR-MCNC: 134 MG/DL (ref 70–130)
GLUCOSE BLDC GLUCOMTR-MCNC: 137 MG/DL (ref 70–130)
GLUCOSE BLDC GLUCOMTR-MCNC: 146 MG/DL (ref 70–130)
GLUCOSE BLDC GLUCOMTR-MCNC: 148 MG/DL (ref 70–130)
GLUCOSE BLDC GLUCOMTR-MCNC: 172 MG/DL (ref 70–130)
PHOSPHATE SERPL-MCNC: 3.8 MG/DL (ref 2.5–4.5)
POTASSIUM BLD-SCNC: 4.4 MMOL/L (ref 3.5–5.2)
SODIUM BLD-SCNC: 130 MMOL/L (ref 136–145)
URATE SERPL-MCNC: 11.3 MG/DL (ref 2.4–5.7)

## 2019-08-14 PROCEDURE — 25010000002 HYDROMORPHONE PER 4 MG: Performed by: HOSPITALIST

## 2019-08-14 PROCEDURE — 99213 OFFICE O/P EST LOW 20 MIN: CPT | Performed by: INTERNAL MEDICINE

## 2019-08-14 PROCEDURE — G0378 HOSPITAL OBSERVATION PER HR: HCPCS

## 2019-08-14 PROCEDURE — 80069 RENAL FUNCTION PANEL: CPT | Performed by: INTERNAL MEDICINE

## 2019-08-14 PROCEDURE — 25010000002 ENOXAPARIN PER 10 MG: Performed by: INTERNAL MEDICINE

## 2019-08-14 PROCEDURE — 82962 GLUCOSE BLOOD TEST: CPT

## 2019-08-14 PROCEDURE — 84550 ASSAY OF BLOOD/URIC ACID: CPT | Performed by: INTERNAL MEDICINE

## 2019-08-14 PROCEDURE — 25010000002 ENOXAPARIN PER 10 MG: Performed by: NEUROLOGICAL SURGERY

## 2019-08-14 PROCEDURE — 63710000001 INSULIN LISPRO (HUMAN) PER 5 UNITS: Performed by: NURSE PRACTITIONER

## 2019-08-14 PROCEDURE — 25010000002 ONDANSETRON PER 1 MG: Performed by: NURSE PRACTITIONER

## 2019-08-14 PROCEDURE — 97165 OT EVAL LOW COMPLEX 30 MIN: CPT

## 2019-08-14 PROCEDURE — 97530 THERAPEUTIC ACTIVITIES: CPT

## 2019-08-14 RX ORDER — CYCLOBENZAPRINE HCL 10 MG
10 TABLET ORAL 3 TIMES DAILY PRN
Status: DISCONTINUED | OUTPATIENT
Start: 2019-08-14 | End: 2019-08-28 | Stop reason: HOSPADM

## 2019-08-14 RX ORDER — SODIUM CHLORIDE 9 MG/ML
100 INJECTION, SOLUTION INTRAVENOUS CONTINUOUS
Status: ACTIVE | OUTPATIENT
Start: 2019-08-14 | End: 2019-08-15

## 2019-08-14 RX ADMIN — ONDANSETRON 4 MG: 2 INJECTION INTRAMUSCULAR; INTRAVENOUS at 16:34

## 2019-08-14 RX ADMIN — CYCLOBENZAPRINE 10 MG: 10 TABLET, FILM COATED ORAL at 16:35

## 2019-08-14 RX ADMIN — CARVEDILOL 12.5 MG: 12.5 TABLET, FILM COATED ORAL at 21:34

## 2019-08-14 RX ADMIN — HYDROCODONE BITARTRATE AND ACETAMINOPHEN 1 TABLET: 5; 325 TABLET ORAL at 10:10

## 2019-08-14 RX ADMIN — SODIUM CHLORIDE 100 ML/HR: 9 INJECTION, SOLUTION INTRAVENOUS at 21:52

## 2019-08-14 RX ADMIN — ENOXAPARIN SODIUM 110 MG: 120 INJECTION SUBCUTANEOUS at 16:36

## 2019-08-14 RX ADMIN — MULTIPLE VITAMINS W/ MINERALS TAB 1 TABLET: TAB at 10:09

## 2019-08-14 RX ADMIN — HYDROCODONE BITARTRATE AND ACETAMINOPHEN 1 TABLET: 5; 325 TABLET ORAL at 21:52

## 2019-08-14 RX ADMIN — SODIUM CHLORIDE, PRESERVATIVE FREE 3 ML: 5 INJECTION INTRAVENOUS at 10:11

## 2019-08-14 RX ADMIN — ATORVASTATIN CALCIUM 10 MG: 10 TABLET, FILM COATED ORAL at 21:34

## 2019-08-14 RX ADMIN — HYDROMORPHONE HYDROCHLORIDE 0.5 MG: 1 INJECTION, SOLUTION INTRAMUSCULAR; INTRAVENOUS; SUBCUTANEOUS at 16:40

## 2019-08-14 RX ADMIN — AMLODIPINE BESYLATE 10 MG: 10 TABLET ORAL at 21:34

## 2019-08-14 RX ADMIN — CARVEDILOL 12.5 MG: 12.5 TABLET, FILM COATED ORAL at 10:09

## 2019-08-14 RX ADMIN — CYCLOBENZAPRINE 10 MG: 10 TABLET, FILM COATED ORAL at 10:09

## 2019-08-14 RX ADMIN — ONDANSETRON 4 MG: 2 INJECTION INTRAMUSCULAR; INTRAVENOUS at 10:15

## 2019-08-14 RX ADMIN — ENOXAPARIN SODIUM 110 MG: 120 INJECTION SUBCUTANEOUS at 01:46

## 2019-08-14 RX ADMIN — ONDANSETRON 4 MG: 2 INJECTION INTRAMUSCULAR; INTRAVENOUS at 21:52

## 2019-08-14 RX ADMIN — DOCUSATE SODIUM 100 MG: 100 CAPSULE, LIQUID FILLED ORAL at 21:34

## 2019-08-14 RX ADMIN — SODIUM CHLORIDE, PRESERVATIVE FREE 3 ML: 5 INJECTION INTRAVENOUS at 21:56

## 2019-08-14 RX ADMIN — DOCUSATE SODIUM 100 MG: 100 CAPSULE, LIQUID FILLED ORAL at 10:12

## 2019-08-14 RX ADMIN — INSULIN LISPRO 2 UNITS: 100 INJECTION, SOLUTION INTRAVENOUS; SUBCUTANEOUS at 21:53

## 2019-08-14 RX ADMIN — Medication 1000 MCG: at 10:09

## 2019-08-15 ENCOUNTER — APPOINTMENT (OUTPATIENT)
Dept: ULTRASOUND IMAGING | Facility: HOSPITAL | Age: 69
End: 2019-08-15

## 2019-08-15 PROBLEM — M48.062 SPINAL STENOSIS, LUMBAR REGION, WITH NEUROGENIC CLAUDICATION: Status: ACTIVE | Noted: 2019-08-15

## 2019-08-15 LAB
ALBUMIN SERPL-MCNC: 3.2 G/DL (ref 3.5–5.2)
ANION GAP SERPL CALCULATED.3IONS-SCNC: 11.1 MMOL/L (ref 5–15)
BUN BLD-MCNC: 70 MG/DL (ref 8–23)
BUN/CREAT SERPL: 31.4 (ref 7–25)
CALCIUM SPEC-SCNC: 8.5 MG/DL (ref 8.6–10.5)
CHLORIDE SERPL-SCNC: 98 MMOL/L (ref 98–107)
CO2 SERPL-SCNC: 22.9 MMOL/L (ref 22–29)
CREAT BLD-MCNC: 2.23 MG/DL (ref 0.57–1)
CREAT UR-MCNC: 53.7 MG/DL
GFR SERPL CREATININE-BSD FRML MDRD: 22 ML/MIN/1.73
GLUCOSE BLD-MCNC: 111 MG/DL (ref 65–99)
GLUCOSE BLDC GLUCOMTR-MCNC: 102 MG/DL (ref 70–130)
GLUCOSE BLDC GLUCOMTR-MCNC: 127 MG/DL (ref 70–130)
GLUCOSE BLDC GLUCOMTR-MCNC: 144 MG/DL (ref 70–130)
GLUCOSE BLDC GLUCOMTR-MCNC: 179 MG/DL (ref 70–130)
MAGNESIUM SERPL-MCNC: 2.8 MG/DL (ref 1.6–2.4)
PHOSPHATE SERPL-MCNC: 4.2 MG/DL (ref 2.5–4.5)
POTASSIUM BLD-SCNC: 4.3 MMOL/L (ref 3.5–5.2)
SODIUM BLD-SCNC: 132 MMOL/L (ref 136–145)
SODIUM UR-SCNC: 24 MMOL/L

## 2019-08-15 PROCEDURE — 25010000002 ONDANSETRON PER 1 MG: Performed by: NURSE PRACTITIONER

## 2019-08-15 PROCEDURE — 84300 ASSAY OF URINE SODIUM: CPT | Performed by: INTERNAL MEDICINE

## 2019-08-15 PROCEDURE — 25010000002 ENOXAPARIN PER 10 MG: Performed by: INTERNAL MEDICINE

## 2019-08-15 PROCEDURE — 82962 GLUCOSE BLOOD TEST: CPT

## 2019-08-15 PROCEDURE — 76775 US EXAM ABDO BACK WALL LIM: CPT

## 2019-08-15 PROCEDURE — 82570 ASSAY OF URINE CREATININE: CPT | Performed by: INTERNAL MEDICINE

## 2019-08-15 PROCEDURE — 83735 ASSAY OF MAGNESIUM: CPT | Performed by: INTERNAL MEDICINE

## 2019-08-15 PROCEDURE — 25010000002 ENOXAPARIN PER 10 MG: Performed by: NEUROLOGICAL SURGERY

## 2019-08-15 PROCEDURE — 25010000002 HYDROMORPHONE PER 4 MG: Performed by: HOSPITALIST

## 2019-08-15 PROCEDURE — 63710000001 INSULIN LISPRO (HUMAN) PER 5 UNITS: Performed by: NURSE PRACTITIONER

## 2019-08-15 PROCEDURE — 99232 SBSQ HOSP IP/OBS MODERATE 35: CPT | Performed by: PHYSICIAN ASSISTANT

## 2019-08-15 PROCEDURE — 80069 RENAL FUNCTION PANEL: CPT | Performed by: INTERNAL MEDICINE

## 2019-08-15 PROCEDURE — 97110 THERAPEUTIC EXERCISES: CPT

## 2019-08-15 RX ORDER — SENNA AND DOCUSATE SODIUM 50; 8.6 MG/1; MG/1
2 TABLET, FILM COATED ORAL NIGHTLY
Status: DISCONTINUED | OUTPATIENT
Start: 2019-08-15 | End: 2019-08-28 | Stop reason: HOSPADM

## 2019-08-15 RX ORDER — SODIUM CHLORIDE 9 MG/ML
100 INJECTION, SOLUTION INTRAVENOUS CONTINUOUS
Status: ACTIVE | OUTPATIENT
Start: 2019-08-15 | End: 2019-08-15

## 2019-08-15 RX ADMIN — ONDANSETRON 4 MG: 2 INJECTION INTRAMUSCULAR; INTRAVENOUS at 04:15

## 2019-08-15 RX ADMIN — ENOXAPARIN SODIUM 110 MG: 120 INJECTION SUBCUTANEOUS at 05:05

## 2019-08-15 RX ADMIN — SENNOSIDES AND DOCUSATE SODIUM 2 TABLET: 8.6; 5 TABLET ORAL at 20:36

## 2019-08-15 RX ADMIN — Medication 1000 MCG: at 08:44

## 2019-08-15 RX ADMIN — HYDROCODONE BITARTRATE AND ACETAMINOPHEN 1 TABLET: 5; 325 TABLET ORAL at 20:46

## 2019-08-15 RX ADMIN — ATORVASTATIN CALCIUM 10 MG: 10 TABLET, FILM COATED ORAL at 20:36

## 2019-08-15 RX ADMIN — SODIUM CHLORIDE, PRESERVATIVE FREE 3 ML: 5 INJECTION INTRAVENOUS at 09:18

## 2019-08-15 RX ADMIN — CARVEDILOL 12.5 MG: 12.5 TABLET, FILM COATED ORAL at 08:44

## 2019-08-15 RX ADMIN — SODIUM CHLORIDE, PRESERVATIVE FREE 3 ML: 5 INJECTION INTRAVENOUS at 20:38

## 2019-08-15 RX ADMIN — ONDANSETRON 4 MG: 2 INJECTION INTRAMUSCULAR; INTRAVENOUS at 20:49

## 2019-08-15 RX ADMIN — INSULIN LISPRO 2 UNITS: 100 INJECTION, SOLUTION INTRAVENOUS; SUBCUTANEOUS at 20:36

## 2019-08-15 RX ADMIN — ENOXAPARIN SODIUM 110 MG: 120 INJECTION SUBCUTANEOUS at 17:39

## 2019-08-15 RX ADMIN — DOCUSATE SODIUM 100 MG: 100 CAPSULE, LIQUID FILLED ORAL at 08:43

## 2019-08-15 RX ADMIN — HYDROMORPHONE HYDROCHLORIDE 0.5 MG: 1 INJECTION, SOLUTION INTRAMUSCULAR; INTRAVENOUS; SUBCUTANEOUS at 11:57

## 2019-08-15 RX ADMIN — SODIUM CHLORIDE 100 ML/HR: 9 INJECTION, SOLUTION INTRAVENOUS at 07:36

## 2019-08-15 RX ADMIN — CARVEDILOL 12.5 MG: 12.5 TABLET, FILM COATED ORAL at 20:36

## 2019-08-15 RX ADMIN — MULTIPLE VITAMINS W/ MINERALS TAB 1 TABLET: TAB at 08:44

## 2019-08-15 RX ADMIN — HYDROMORPHONE HYDROCHLORIDE 0.5 MG: 1 INJECTION, SOLUTION INTRAMUSCULAR; INTRAVENOUS; SUBCUTANEOUS at 04:15

## 2019-08-16 LAB
ALBUMIN SERPL-MCNC: 3 G/DL (ref 3.5–5.2)
ANION GAP SERPL CALCULATED.3IONS-SCNC: 10.8 MMOL/L (ref 5–15)
BUN BLD-MCNC: 60 MG/DL (ref 8–23)
BUN/CREAT SERPL: 27.8 (ref 7–25)
CALCIUM SPEC-SCNC: 8.5 MG/DL (ref 8.6–10.5)
CHLORIDE SERPL-SCNC: 100 MMOL/L (ref 98–107)
CO2 SERPL-SCNC: 23.2 MMOL/L (ref 22–29)
CREAT BLD-MCNC: 2.16 MG/DL (ref 0.57–1)
DEPRECATED RDW RBC AUTO: 42.3 FL (ref 37–54)
ERYTHROCYTE [DISTWIDTH] IN BLOOD BY AUTOMATED COUNT: 14.2 % (ref 12.3–15.4)
GFR SERPL CREATININE-BSD FRML MDRD: 23 ML/MIN/1.73
GLUCOSE BLD-MCNC: 140 MG/DL (ref 65–99)
GLUCOSE BLDC GLUCOMTR-MCNC: 126 MG/DL (ref 70–130)
GLUCOSE BLDC GLUCOMTR-MCNC: 133 MG/DL (ref 70–130)
GLUCOSE BLDC GLUCOMTR-MCNC: 152 MG/DL (ref 70–130)
GLUCOSE BLDC GLUCOMTR-MCNC: 183 MG/DL (ref 70–130)
HCT VFR BLD AUTO: 35.2 % (ref 34–46.6)
HGB BLD-MCNC: 10.8 G/DL (ref 12–15.9)
MCH RBC QN AUTO: 25.3 PG (ref 26.6–33)
MCHC RBC AUTO-ENTMCNC: 30.7 G/DL (ref 31.5–35.7)
MCV RBC AUTO: 82.4 FL (ref 79–97)
PHOSPHATE SERPL-MCNC: 3.5 MG/DL (ref 2.5–4.5)
PLATELET # BLD AUTO: 261 10*3/MM3 (ref 140–450)
PMV BLD AUTO: 10.6 FL (ref 6–12)
POTASSIUM BLD-SCNC: 4.7 MMOL/L (ref 3.5–5.2)
RBC # BLD AUTO: 4.27 10*6/MM3 (ref 3.77–5.28)
SODIUM BLD-SCNC: 134 MMOL/L (ref 136–145)
WBC NRBC COR # BLD: 4.6 10*3/MM3 (ref 3.4–10.8)

## 2019-08-16 PROCEDURE — 99232 SBSQ HOSP IP/OBS MODERATE 35: CPT | Performed by: PHYSICIAN ASSISTANT

## 2019-08-16 PROCEDURE — 80069 RENAL FUNCTION PANEL: CPT | Performed by: INTERNAL MEDICINE

## 2019-08-16 PROCEDURE — 85027 COMPLETE CBC AUTOMATED: CPT | Performed by: INTERNAL MEDICINE

## 2019-08-16 PROCEDURE — 99231 SBSQ HOSP IP/OBS SF/LOW 25: CPT | Performed by: NURSE PRACTITIONER

## 2019-08-16 PROCEDURE — 25010000002 ONDANSETRON PER 1 MG: Performed by: NURSE PRACTITIONER

## 2019-08-16 PROCEDURE — 63710000001 INSULIN LISPRO (HUMAN) PER 5 UNITS: Performed by: NURSE PRACTITIONER

## 2019-08-16 PROCEDURE — 82962 GLUCOSE BLOOD TEST: CPT

## 2019-08-16 PROCEDURE — 25010000002 ENOXAPARIN PER 10 MG: Performed by: INTERNAL MEDICINE

## 2019-08-16 PROCEDURE — 97110 THERAPEUTIC EXERCISES: CPT

## 2019-08-16 RX ORDER — BISACODYL 10 MG
10 SUPPOSITORY, RECTAL RECTAL DAILY PRN
Status: DISCONTINUED | OUTPATIENT
Start: 2019-08-16 | End: 2019-08-28 | Stop reason: HOSPADM

## 2019-08-16 RX ADMIN — HYDROCODONE BITARTRATE AND ACETAMINOPHEN 1 TABLET: 5; 325 TABLET ORAL at 22:47

## 2019-08-16 RX ADMIN — CARVEDILOL 12.5 MG: 12.5 TABLET, FILM COATED ORAL at 22:47

## 2019-08-16 RX ADMIN — Medication 1000 MCG: at 08:51

## 2019-08-16 RX ADMIN — HYDROCODONE BITARTRATE AND ACETAMINOPHEN 1 TABLET: 5; 325 TABLET ORAL at 15:35

## 2019-08-16 RX ADMIN — SODIUM CHLORIDE, PRESERVATIVE FREE 3 ML: 5 INJECTION INTRAVENOUS at 08:49

## 2019-08-16 RX ADMIN — INSULIN LISPRO 2 UNITS: 100 INJECTION, SOLUTION INTRAVENOUS; SUBCUTANEOUS at 12:07

## 2019-08-16 RX ADMIN — SODIUM CHLORIDE, PRESERVATIVE FREE 3 ML: 5 INJECTION INTRAVENOUS at 22:50

## 2019-08-16 RX ADMIN — ENOXAPARIN SODIUM 110 MG: 120 INJECTION SUBCUTANEOUS at 06:30

## 2019-08-16 RX ADMIN — ENOXAPARIN SODIUM 110 MG: 120 INJECTION SUBCUTANEOUS at 17:47

## 2019-08-16 RX ADMIN — INSULIN LISPRO 2 UNITS: 100 INJECTION, SOLUTION INTRAVENOUS; SUBCUTANEOUS at 22:50

## 2019-08-16 RX ADMIN — MULTIPLE VITAMINS W/ MINERALS TAB 1 TABLET: TAB at 08:51

## 2019-08-16 RX ADMIN — HYDROCODONE BITARTRATE AND ACETAMINOPHEN 1 TABLET: 5; 325 TABLET ORAL at 08:51

## 2019-08-16 RX ADMIN — CARVEDILOL 12.5 MG: 12.5 TABLET, FILM COATED ORAL at 08:49

## 2019-08-16 RX ADMIN — ATORVASTATIN CALCIUM 10 MG: 10 TABLET, FILM COATED ORAL at 22:50

## 2019-08-16 RX ADMIN — ONDANSETRON 4 MG: 2 INJECTION INTRAMUSCULAR; INTRAVENOUS at 15:37

## 2019-08-16 RX ADMIN — POLYETHYLENE GLYCOL 3350 17 G: 17 POWDER, FOR SOLUTION ORAL at 10:36

## 2019-08-16 RX ADMIN — ONDANSETRON 4 MG: 2 INJECTION INTRAMUSCULAR; INTRAVENOUS at 22:47

## 2019-08-17 LAB
ALBUMIN SERPL-MCNC: 3.4 G/DL (ref 3.5–5.2)
ANION GAP SERPL CALCULATED.3IONS-SCNC: 12.6 MMOL/L (ref 5–15)
BUN BLD-MCNC: 40 MG/DL (ref 8–23)
BUN/CREAT SERPL: 25.5 (ref 7–25)
CALCIUM SPEC-SCNC: 9.1 MG/DL (ref 8.6–10.5)
CHLORIDE SERPL-SCNC: 97 MMOL/L (ref 98–107)
CO2 SERPL-SCNC: 22.4 MMOL/L (ref 22–29)
CREAT BLD-MCNC: 1.57 MG/DL (ref 0.57–1)
GFR SERPL CREATININE-BSD FRML MDRD: 33 ML/MIN/1.73
GLUCOSE BLD-MCNC: 111 MG/DL (ref 65–99)
GLUCOSE BLDC GLUCOMTR-MCNC: 113 MG/DL (ref 70–130)
GLUCOSE BLDC GLUCOMTR-MCNC: 114 MG/DL (ref 70–130)
GLUCOSE BLDC GLUCOMTR-MCNC: 144 MG/DL (ref 70–130)
GLUCOSE BLDC GLUCOMTR-MCNC: 158 MG/DL (ref 70–130)
PHOSPHATE SERPL-MCNC: 2.8 MG/DL (ref 2.5–4.5)
POTASSIUM BLD-SCNC: 4.3 MMOL/L (ref 3.5–5.2)
SODIUM BLD-SCNC: 132 MMOL/L (ref 136–145)

## 2019-08-17 PROCEDURE — 80069 RENAL FUNCTION PANEL: CPT | Performed by: INTERNAL MEDICINE

## 2019-08-17 PROCEDURE — 25010000002 ONDANSETRON PER 1 MG: Performed by: NURSE PRACTITIONER

## 2019-08-17 PROCEDURE — 25010000002 ENOXAPARIN PER 10 MG: Performed by: NEUROLOGICAL SURGERY

## 2019-08-17 PROCEDURE — 82962 GLUCOSE BLOOD TEST: CPT

## 2019-08-17 PROCEDURE — 99232 SBSQ HOSP IP/OBS MODERATE 35: CPT | Performed by: NEUROLOGICAL SURGERY

## 2019-08-17 PROCEDURE — 99232 SBSQ HOSP IP/OBS MODERATE 35: CPT | Performed by: NURSE PRACTITIONER

## 2019-08-17 PROCEDURE — 63710000001 INSULIN LISPRO (HUMAN) PER 5 UNITS: Performed by: NURSE PRACTITIONER

## 2019-08-17 PROCEDURE — 25010000002 ENOXAPARIN PER 10 MG: Performed by: INTERNAL MEDICINE

## 2019-08-17 RX ORDER — HYDROCODONE BITARTRATE AND ACETAMINOPHEN 5; 325 MG/1; MG/1
2 TABLET ORAL EVERY 4 HOURS PRN
Status: DISPENSED | OUTPATIENT
Start: 2019-08-17 | End: 2019-08-27

## 2019-08-17 RX ORDER — HYDROCODONE BITARTRATE AND ACETAMINOPHEN 5; 325 MG/1; MG/1
1 TABLET ORAL EVERY 4 HOURS PRN
Status: DISCONTINUED | OUTPATIENT
Start: 2019-08-17 | End: 2019-08-28 | Stop reason: HOSPADM

## 2019-08-17 RX ADMIN — MULTIPLE VITAMINS W/ MINERALS TAB 1 TABLET: TAB at 09:01

## 2019-08-17 RX ADMIN — SODIUM CHLORIDE, PRESERVATIVE FREE 3 ML: 5 INJECTION INTRAVENOUS at 09:01

## 2019-08-17 RX ADMIN — CYCLOBENZAPRINE 10 MG: 10 TABLET, FILM COATED ORAL at 08:00

## 2019-08-17 RX ADMIN — INSULIN LISPRO 2 UNITS: 100 INJECTION, SOLUTION INTRAVENOUS; SUBCUTANEOUS at 20:33

## 2019-08-17 RX ADMIN — SODIUM CHLORIDE, PRESERVATIVE FREE 3 ML: 5 INJECTION INTRAVENOUS at 20:35

## 2019-08-17 RX ADMIN — CARVEDILOL 12.5 MG: 12.5 TABLET, FILM COATED ORAL at 20:33

## 2019-08-17 RX ADMIN — CARVEDILOL 12.5 MG: 12.5 TABLET, FILM COATED ORAL at 09:01

## 2019-08-17 RX ADMIN — ONDANSETRON 4 MG: 2 INJECTION INTRAMUSCULAR; INTRAVENOUS at 20:41

## 2019-08-17 RX ADMIN — HYDROCODONE BITARTRATE AND ACETAMINOPHEN 1 TABLET: 5; 325 TABLET ORAL at 20:33

## 2019-08-17 RX ADMIN — ENOXAPARIN SODIUM 110 MG: 120 INJECTION SUBCUTANEOUS at 16:19

## 2019-08-17 RX ADMIN — Medication 1000 MCG: at 09:01

## 2019-08-17 RX ADMIN — ATORVASTATIN CALCIUM 10 MG: 10 TABLET, FILM COATED ORAL at 20:34

## 2019-08-17 RX ADMIN — ONDANSETRON 4 MG: 2 INJECTION INTRAMUSCULAR; INTRAVENOUS at 08:00

## 2019-08-17 RX ADMIN — ENOXAPARIN SODIUM 110 MG: 120 INJECTION SUBCUTANEOUS at 06:38

## 2019-08-17 RX ADMIN — HYDROCODONE BITARTRATE AND ACETAMINOPHEN 1 TABLET: 5; 325 TABLET ORAL at 08:00

## 2019-08-17 RX ADMIN — CYCLOBENZAPRINE 10 MG: 10 TABLET, FILM COATED ORAL at 20:41

## 2019-08-18 ENCOUNTER — ANESTHESIA EVENT (OUTPATIENT)
Dept: PERIOP | Facility: HOSPITAL | Age: 69
End: 2019-08-18

## 2019-08-18 LAB
ALBUMIN SERPL-MCNC: 3.1 G/DL (ref 3.5–5.2)
ANION GAP SERPL CALCULATED.3IONS-SCNC: 9.9 MMOL/L (ref 5–15)
BUN BLD-MCNC: 34 MG/DL (ref 8–23)
BUN/CREAT SERPL: 21.5 (ref 7–25)
CALCIUM SPEC-SCNC: 8.8 MG/DL (ref 8.6–10.5)
CHLORIDE SERPL-SCNC: 99 MMOL/L (ref 98–107)
CO2 SERPL-SCNC: 25.1 MMOL/L (ref 22–29)
CREAT BLD-MCNC: 1.58 MG/DL (ref 0.57–1)
GFR SERPL CREATININE-BSD FRML MDRD: 33 ML/MIN/1.73
GLUCOSE BLD-MCNC: 95 MG/DL (ref 65–99)
GLUCOSE BLDC GLUCOMTR-MCNC: 124 MG/DL (ref 70–130)
GLUCOSE BLDC GLUCOMTR-MCNC: 163 MG/DL (ref 70–130)
GLUCOSE BLDC GLUCOMTR-MCNC: 84 MG/DL (ref 70–130)
GLUCOSE BLDC GLUCOMTR-MCNC: 97 MG/DL (ref 70–130)
PHOSPHATE SERPL-MCNC: 2.8 MG/DL (ref 2.5–4.5)
POTASSIUM BLD-SCNC: 4.5 MMOL/L (ref 3.5–5.2)
SODIUM BLD-SCNC: 134 MMOL/L (ref 136–145)

## 2019-08-18 PROCEDURE — 97110 THERAPEUTIC EXERCISES: CPT

## 2019-08-18 PROCEDURE — 25010000002 ONDANSETRON PER 1 MG: Performed by: NURSE PRACTITIONER

## 2019-08-18 PROCEDURE — 99232 SBSQ HOSP IP/OBS MODERATE 35: CPT | Performed by: NURSE PRACTITIONER

## 2019-08-18 PROCEDURE — 80069 RENAL FUNCTION PANEL: CPT | Performed by: INTERNAL MEDICINE

## 2019-08-18 PROCEDURE — 82962 GLUCOSE BLOOD TEST: CPT

## 2019-08-18 PROCEDURE — 99231 SBSQ HOSP IP/OBS SF/LOW 25: CPT | Performed by: NEUROLOGICAL SURGERY

## 2019-08-18 RX ADMIN — SENNOSIDES AND DOCUSATE SODIUM 2 TABLET: 8.6; 5 TABLET ORAL at 21:06

## 2019-08-18 RX ADMIN — CYCLOBENZAPRINE 10 MG: 10 TABLET, FILM COATED ORAL at 11:13

## 2019-08-18 RX ADMIN — CARVEDILOL 12.5 MG: 12.5 TABLET, FILM COATED ORAL at 08:27

## 2019-08-18 RX ADMIN — ATORVASTATIN CALCIUM 10 MG: 10 TABLET, FILM COATED ORAL at 21:06

## 2019-08-18 RX ADMIN — ONDANSETRON 4 MG: 2 INJECTION INTRAMUSCULAR; INTRAVENOUS at 11:13

## 2019-08-18 RX ADMIN — CARVEDILOL 12.5 MG: 12.5 TABLET, FILM COATED ORAL at 21:06

## 2019-08-18 RX ADMIN — Medication 1000 MCG: at 08:27

## 2019-08-18 RX ADMIN — MULTIPLE VITAMINS W/ MINERALS TAB 1 TABLET: TAB at 08:27

## 2019-08-18 RX ADMIN — HYDROCODONE BITARTRATE AND ACETAMINOPHEN 1 TABLET: 5; 325 TABLET ORAL at 11:13

## 2019-08-18 RX ADMIN — SODIUM CHLORIDE, PRESERVATIVE FREE 3 ML: 5 INJECTION INTRAVENOUS at 21:06

## 2019-08-18 RX ADMIN — SODIUM CHLORIDE, PRESERVATIVE FREE 3 ML: 5 INJECTION INTRAVENOUS at 08:27

## 2019-08-18 NOTE — ANESTHESIA PREPROCEDURE EVALUATION
Anesthesia Evaluation     Patient summary reviewed and Nursing notes reviewed   no history of anesthetic complications:  NPO Solid Status: > 8 hours  NPO Liquid Status: > 2 hours           Airway   Mallampati: III  TM distance: >3 FB  Neck ROM: full  Possible difficult intubation  Dental    (+) poor dentition        Pulmonary - negative pulmonary ROS and normal exam   Cardiovascular     ECG reviewed  Patient on routine beta blocker and Beta blocker given within 24 hours of surgery  Rhythm: irregular    (+) hypertension, dysrhythmias (new onset, this admission) Atrial Fib, murmur, hyperlipidemia,     ROS comment: EF 61%  MV regurg.    Neuro/Psych  (+) numbness,     GI/Hepatic/Renal/Endo    (+) morbid obesity,  renal disease (cr 1.39) CRI, diabetes mellitus type 1 well controlled,     Musculoskeletal     Abdominal    Substance History      OB/GYN          Other   (+) arthritis                   Anesthesia Plan    ASA 3     general   (  D/W R&B of GA including but not limited to: heart, lung, liver, kidney, neurologic problems, positioning injuries, dental damage, corneal abrasion and TMJ.  .)  intravenous induction

## 2019-08-19 ENCOUNTER — APPOINTMENT (OUTPATIENT)
Dept: GENERAL RADIOLOGY | Facility: HOSPITAL | Age: 69
End: 2019-08-19

## 2019-08-19 ENCOUNTER — ANESTHESIA (OUTPATIENT)
Dept: PERIOP | Facility: HOSPITAL | Age: 69
End: 2019-08-19

## 2019-08-19 LAB
ALBUMIN SERPL-MCNC: 3.1 G/DL (ref 3.5–5.2)
ANION GAP SERPL CALCULATED.3IONS-SCNC: 10.3 MMOL/L (ref 5–15)
BUN BLD-MCNC: 30 MG/DL (ref 8–23)
BUN/CREAT SERPL: 21.6 (ref 7–25)
CALCIUM SPEC-SCNC: 9 MG/DL (ref 8.6–10.5)
CHLORIDE SERPL-SCNC: 100 MMOL/L (ref 98–107)
CO2 SERPL-SCNC: 25.7 MMOL/L (ref 22–29)
CREAT BLD-MCNC: 1.39 MG/DL (ref 0.57–1)
DEPRECATED RDW RBC AUTO: 43.4 FL (ref 37–54)
ERYTHROCYTE [DISTWIDTH] IN BLOOD BY AUTOMATED COUNT: 14.3 % (ref 12.3–15.4)
GFR SERPL CREATININE-BSD FRML MDRD: 38 ML/MIN/1.73
GLUCOSE BLD-MCNC: 118 MG/DL (ref 65–99)
GLUCOSE BLDC GLUCOMTR-MCNC: 112 MG/DL (ref 70–130)
GLUCOSE BLDC GLUCOMTR-MCNC: 115 MG/DL (ref 70–130)
GLUCOSE BLDC GLUCOMTR-MCNC: 140 MG/DL (ref 70–130)
HCT VFR BLD AUTO: 37.9 % (ref 34–46.6)
HGB BLD-MCNC: 11.2 G/DL (ref 12–15.9)
MAGNESIUM SERPL-MCNC: 2.5 MG/DL (ref 1.6–2.4)
MCH RBC QN AUTO: 24.8 PG (ref 26.6–33)
MCHC RBC AUTO-ENTMCNC: 29.6 G/DL (ref 31.5–35.7)
MCV RBC AUTO: 83.8 FL (ref 79–97)
PHOSPHATE SERPL-MCNC: 2.6 MG/DL (ref 2.5–4.5)
PLATELET # BLD AUTO: 255 10*3/MM3 (ref 140–450)
PMV BLD AUTO: 10.2 FL (ref 6–12)
POTASSIUM BLD-SCNC: 4.4 MMOL/L (ref 3.5–5.2)
RBC # BLD AUTO: 4.52 10*6/MM3 (ref 3.77–5.28)
SODIUM BLD-SCNC: 136 MMOL/L (ref 136–145)
WBC NRBC COR # BLD: 6.22 10*3/MM3 (ref 3.4–10.8)

## 2019-08-19 PROCEDURE — 01NB0ZZ RELEASE LUMBAR NERVE, OPEN APPROACH: ICD-10-PCS | Performed by: NEUROLOGICAL SURGERY

## 2019-08-19 PROCEDURE — 25010000002 MIDAZOLAM PER 1 MG: Performed by: ANESTHESIOLOGY

## 2019-08-19 PROCEDURE — 0SB20ZZ EXCISION OF LUMBAR VERTEBRAL DISC, OPEN APPROACH: ICD-10-PCS | Performed by: NEUROLOGICAL SURGERY

## 2019-08-19 PROCEDURE — 25010000002 NEOSTIGMINE PER 0.5 MG: Performed by: NURSE ANESTHETIST, CERTIFIED REGISTERED

## 2019-08-19 PROCEDURE — 76000 FLUOROSCOPY <1 HR PHYS/QHP: CPT

## 2019-08-19 PROCEDURE — 85027 COMPLETE CBC AUTOMATED: CPT | Performed by: INTERNAL MEDICINE

## 2019-08-19 PROCEDURE — 25010000003 LIDOCAINE 1 % SOLUTION 20 ML VIAL: Performed by: NEUROLOGICAL SURGERY

## 2019-08-19 PROCEDURE — 00U20KZ SUPPLEMENT DURA MATER WITH NONAUTOLOGOUS TISSUE SUBSTITUTE, OPEN APPROACH: ICD-10-PCS | Performed by: NEUROLOGICAL SURGERY

## 2019-08-19 PROCEDURE — 83735 ASSAY OF MAGNESIUM: CPT | Performed by: INTERNAL MEDICINE

## 2019-08-19 PROCEDURE — 25010000002 ONDANSETRON PER 1 MG: Performed by: NURSE ANESTHETIST, CERTIFIED REGISTERED

## 2019-08-19 PROCEDURE — 63048 LAM FACETEC &FORAMOT EA ADDL: CPT | Performed by: NEUROLOGICAL SURGERY

## 2019-08-19 PROCEDURE — 80069 RENAL FUNCTION PANEL: CPT | Performed by: INTERNAL MEDICINE

## 2019-08-19 PROCEDURE — 25010000002 FENTANYL CITRATE (PF) 100 MCG/2ML SOLUTION: Performed by: NURSE ANESTHETIST, CERTIFIED REGISTERED

## 2019-08-19 PROCEDURE — 25010000002 PHENYLEPHRINE PER 1 ML: Performed by: ANESTHESIOLOGY

## 2019-08-19 PROCEDURE — 82962 GLUCOSE BLOOD TEST: CPT

## 2019-08-19 PROCEDURE — 25010000002 VANCOMYCIN 1 G RECONSTITUTED SOLUTION 1 EACH VIAL: Performed by: NEUROLOGICAL SURGERY

## 2019-08-19 PROCEDURE — 25010000002 PROPOFOL 10 MG/ML EMULSION: Performed by: ANESTHESIOLOGY

## 2019-08-19 PROCEDURE — 63047 LAM FACETEC & FORAMOT LUMBAR: CPT | Performed by: NEUROLOGICAL SURGERY

## 2019-08-19 PROCEDURE — 25010000002 VANCOMYCIN 10 G RECONSTITUTED SOLUTION: Performed by: NEUROLOGICAL SURGERY

## 2019-08-19 PROCEDURE — 72020 X-RAY EXAM OF SPINE 1 VIEW: CPT

## 2019-08-19 PROCEDURE — 25010000002 HYDROMORPHONE PER 4 MG: Performed by: HOSPITALIST

## 2019-08-19 PROCEDURE — 25010000002 DEXAMETHASONE PER 1 MG: Performed by: ANESTHESIOLOGY

## 2019-08-19 PROCEDURE — 25010000002 FENTANYL CITRATE (PF) 100 MCG/2ML SOLUTION: Performed by: ANESTHESIOLOGY

## 2019-08-19 DEVICE — SEALANT WND FIBRIN TISSEEL PREFIL/SYR/PRIMAFZ 10ML: Type: IMPLANTABLE DEVICE | Site: SPINE LUMBAR | Status: FUNCTIONAL

## 2019-08-19 DEVICE — DURAGEN® PLUS DURAL REGENERATION MATRIX, 2 IN X 2 IN (5 CM X 5 CM)
Type: IMPLANTABLE DEVICE | Site: SPINE LUMBAR | Status: FUNCTIONAL
Brand: DURAGEN® PLUS

## 2019-08-19 DEVICE — WAX BONE HEMO NAT 2.5G: Type: IMPLANTABLE DEVICE | Site: SPINE LUMBAR | Status: FUNCTIONAL

## 2019-08-19 RX ORDER — MORPHINE SULFATE 2 MG/ML
2 INJECTION, SOLUTION INTRAMUSCULAR; INTRAVENOUS
Status: DISCONTINUED | OUTPATIENT
Start: 2019-08-19 | End: 2019-08-19 | Stop reason: HOSPADM

## 2019-08-19 RX ORDER — DEXAMETHASONE SODIUM PHOSPHATE 10 MG/ML
INJECTION INTRAMUSCULAR; INTRAVENOUS AS NEEDED
Status: DISCONTINUED | OUTPATIENT
Start: 2019-08-19 | End: 2019-08-19 | Stop reason: SURG

## 2019-08-19 RX ORDER — SODIUM CHLORIDE, SODIUM LACTATE, POTASSIUM CHLORIDE, CALCIUM CHLORIDE 600; 310; 30; 20 MG/100ML; MG/100ML; MG/100ML; MG/100ML
100 INJECTION, SOLUTION INTRAVENOUS CONTINUOUS
Status: DISCONTINUED | OUTPATIENT
Start: 2019-08-19 | End: 2019-08-21

## 2019-08-19 RX ORDER — PROPOFOL 10 MG/ML
VIAL (ML) INTRAVENOUS AS NEEDED
Status: DISCONTINUED | OUTPATIENT
Start: 2019-08-19 | End: 2019-08-19 | Stop reason: SURG

## 2019-08-19 RX ORDER — MAGNESIUM HYDROXIDE 1200 MG/15ML
LIQUID ORAL AS NEEDED
Status: DISCONTINUED | OUTPATIENT
Start: 2019-08-19 | End: 2019-08-19 | Stop reason: HOSPADM

## 2019-08-19 RX ORDER — FENTANYL CITRATE 50 UG/ML
INJECTION, SOLUTION INTRAMUSCULAR; INTRAVENOUS AS NEEDED
Status: DISCONTINUED | OUTPATIENT
Start: 2019-08-19 | End: 2019-08-19 | Stop reason: SURG

## 2019-08-19 RX ORDER — DOCUSATE SODIUM 100 MG/1
100 CAPSULE, LIQUID FILLED ORAL 2 TIMES DAILY PRN
Status: DISCONTINUED | OUTPATIENT
Start: 2019-08-19 | End: 2019-08-28 | Stop reason: HOSPADM

## 2019-08-19 RX ORDER — LABETALOL HYDROCHLORIDE 5 MG/ML
5 INJECTION, SOLUTION INTRAVENOUS
Status: DISCONTINUED | OUTPATIENT
Start: 2019-08-19 | End: 2019-08-19 | Stop reason: HOSPADM

## 2019-08-19 RX ORDER — MIDAZOLAM HYDROCHLORIDE 1 MG/ML
1 INJECTION INTRAMUSCULAR; INTRAVENOUS
Status: DISCONTINUED | OUTPATIENT
Start: 2019-08-19 | End: 2019-08-19 | Stop reason: HOSPADM

## 2019-08-19 RX ORDER — GLYCOPYRROLATE 0.2 MG/ML
INJECTION INTRAMUSCULAR; INTRAVENOUS AS NEEDED
Status: DISCONTINUED | OUTPATIENT
Start: 2019-08-19 | End: 2019-08-19 | Stop reason: SURG

## 2019-08-19 RX ORDER — FENTANYL CITRATE 50 UG/ML
25 INJECTION, SOLUTION INTRAMUSCULAR; INTRAVENOUS
Status: DISCONTINUED | OUTPATIENT
Start: 2019-08-19 | End: 2019-08-19 | Stop reason: HOSPADM

## 2019-08-19 RX ORDER — MEPERIDINE HYDROCHLORIDE 25 MG/ML
12.5 INJECTION INTRAMUSCULAR; INTRAVENOUS; SUBCUTANEOUS ONCE
Status: DISCONTINUED | OUTPATIENT
Start: 2019-08-19 | End: 2019-08-19 | Stop reason: HOSPADM

## 2019-08-19 RX ORDER — FENTANYL CITRATE 50 UG/ML
50 INJECTION, SOLUTION INTRAMUSCULAR; INTRAVENOUS
Status: DISCONTINUED | OUTPATIENT
Start: 2019-08-19 | End: 2019-08-19 | Stop reason: HOSPADM

## 2019-08-19 RX ORDER — SODIUM CHLORIDE 0.9 % (FLUSH) 0.9 %
1-10 SYRINGE (ML) INJECTION AS NEEDED
Status: DISCONTINUED | OUTPATIENT
Start: 2019-08-19 | End: 2019-08-19 | Stop reason: HOSPADM

## 2019-08-19 RX ORDER — MIDAZOLAM HYDROCHLORIDE 1 MG/ML
2 INJECTION INTRAMUSCULAR; INTRAVENOUS
Status: DISCONTINUED | OUTPATIENT
Start: 2019-08-19 | End: 2019-08-19 | Stop reason: HOSPADM

## 2019-08-19 RX ORDER — EPHEDRINE SULFATE 50 MG/ML
5 INJECTION, SOLUTION INTRAVENOUS ONCE AS NEEDED
Status: DISCONTINUED | OUTPATIENT
Start: 2019-08-19 | End: 2019-08-19 | Stop reason: HOSPADM

## 2019-08-19 RX ORDER — SODIUM CHLORIDE, SODIUM LACTATE, POTASSIUM CHLORIDE, CALCIUM CHLORIDE 600; 310; 30; 20 MG/100ML; MG/100ML; MG/100ML; MG/100ML
75 INJECTION, SOLUTION INTRAVENOUS CONTINUOUS
Status: DISCONTINUED | OUTPATIENT
Start: 2019-08-19 | End: 2019-08-21

## 2019-08-19 RX ORDER — ONDANSETRON 2 MG/ML
INJECTION INTRAMUSCULAR; INTRAVENOUS AS NEEDED
Status: DISCONTINUED | OUTPATIENT
Start: 2019-08-19 | End: 2019-08-19 | Stop reason: SURG

## 2019-08-19 RX ORDER — NALOXONE HCL 0.4 MG/ML
0.4 VIAL (ML) INJECTION AS NEEDED
Status: DISCONTINUED | OUTPATIENT
Start: 2019-08-19 | End: 2019-08-19 | Stop reason: HOSPADM

## 2019-08-19 RX ORDER — ONDANSETRON 2 MG/ML
4 INJECTION INTRAMUSCULAR; INTRAVENOUS ONCE AS NEEDED
Status: DISCONTINUED | OUTPATIENT
Start: 2019-08-19 | End: 2019-08-19 | Stop reason: HOSPADM

## 2019-08-19 RX ORDER — LIDOCAINE HYDROCHLORIDE 10 MG/ML
0.5 INJECTION, SOLUTION EPIDURAL; INFILTRATION; INTRACAUDAL; PERINEURAL ONCE AS NEEDED
Status: DISCONTINUED | OUTPATIENT
Start: 2019-08-19 | End: 2019-08-19 | Stop reason: HOSPADM

## 2019-08-19 RX ORDER — SODIUM CHLORIDE, SODIUM LACTATE, POTASSIUM CHLORIDE, CALCIUM CHLORIDE 600; 310; 30; 20 MG/100ML; MG/100ML; MG/100ML; MG/100ML
9 INJECTION, SOLUTION INTRAVENOUS CONTINUOUS
Status: DISCONTINUED | OUTPATIENT
Start: 2019-08-19 | End: 2019-08-21

## 2019-08-19 RX ORDER — ROCURONIUM BROMIDE 10 MG/ML
INJECTION, SOLUTION INTRAVENOUS AS NEEDED
Status: DISCONTINUED | OUTPATIENT
Start: 2019-08-19 | End: 2019-08-19 | Stop reason: SURG

## 2019-08-19 RX ORDER — FAMOTIDINE 10 MG/ML
20 INJECTION, SOLUTION INTRAVENOUS ONCE
Status: COMPLETED | OUTPATIENT
Start: 2019-08-19 | End: 2019-08-19

## 2019-08-19 RX ORDER — BISACODYL 5 MG/1
10 TABLET, DELAYED RELEASE ORAL DAILY PRN
Status: DISCONTINUED | OUTPATIENT
Start: 2019-08-19 | End: 2019-08-28 | Stop reason: HOSPADM

## 2019-08-19 RX ADMIN — SODIUM CHLORIDE, POTASSIUM CHLORIDE, SODIUM LACTATE AND CALCIUM CHLORIDE: 600; 310; 30; 20 INJECTION, SOLUTION INTRAVENOUS at 17:07

## 2019-08-19 RX ADMIN — PHENYLEPHRINE HYDROCHLORIDE 100 MCG: 10 INJECTION INTRAVENOUS at 16:43

## 2019-08-19 RX ADMIN — CARVEDILOL 12.5 MG: 12.5 TABLET, FILM COATED ORAL at 08:23

## 2019-08-19 RX ADMIN — ALFENTANIL HYDROCHLORIDE 500 MCG: 500 INJECTION INTRAVENOUS at 15:47

## 2019-08-19 RX ADMIN — CYCLOBENZAPRINE 10 MG: 10 TABLET, FILM COATED ORAL at 20:27

## 2019-08-19 RX ADMIN — SODIUM CHLORIDE, PRESERVATIVE FREE 3 ML: 5 INJECTION INTRAVENOUS at 21:35

## 2019-08-19 RX ADMIN — FAMOTIDINE 20 MG: 10 INJECTION INTRAVENOUS at 14:33

## 2019-08-19 RX ADMIN — SENNOSIDES AND DOCUSATE SODIUM 2 TABLET: 8.6; 5 TABLET ORAL at 23:18

## 2019-08-19 RX ADMIN — PROPOFOL 30 MG: 10 INJECTION, EMULSION INTRAVENOUS at 16:56

## 2019-08-19 RX ADMIN — ATORVASTATIN CALCIUM 10 MG: 10 TABLET, FILM COATED ORAL at 23:18

## 2019-08-19 RX ADMIN — HYDROCODONE BITARTRATE AND ACETAMINOPHEN 1 TABLET: 5; 325 TABLET ORAL at 20:27

## 2019-08-19 RX ADMIN — PHENYLEPHRINE HYDROCHLORIDE 200 MCG: 10 INJECTION INTRAVENOUS at 16:28

## 2019-08-19 RX ADMIN — ONDANSETRON 4 MG: 2 INJECTION INTRAMUSCULAR; INTRAVENOUS at 18:07

## 2019-08-19 RX ADMIN — FENTANYL CITRATE 50 MCG: 50 INJECTION, SOLUTION INTRAMUSCULAR; INTRAVENOUS at 14:33

## 2019-08-19 RX ADMIN — PHENYLEPHRINE HYDROCHLORIDE 200 MCG: 10 INJECTION INTRAVENOUS at 16:15

## 2019-08-19 RX ADMIN — PHENYLEPHRINE HYDROCHLORIDE 100 MCG: 10 INJECTION INTRAVENOUS at 17:13

## 2019-08-19 RX ADMIN — MIDAZOLAM 1 MG: 1 INJECTION INTRAMUSCULAR; INTRAVENOUS at 14:33

## 2019-08-19 RX ADMIN — VANCOMYCIN HYDROCHLORIDE 1750 MG: 10 INJECTION, POWDER, LYOPHILIZED, FOR SOLUTION INTRAVENOUS at 15:22

## 2019-08-19 RX ADMIN — FENTANYL CITRATE 50 MCG: 50 INJECTION INTRAMUSCULAR; INTRAVENOUS at 16:56

## 2019-08-19 RX ADMIN — GLYCOPYRROLATE 0.4 MG: 0.2 INJECTION INTRAMUSCULAR; INTRAVENOUS at 18:16

## 2019-08-19 RX ADMIN — ALFENTANIL HYDROCHLORIDE 500 MCG: 500 INJECTION INTRAVENOUS at 15:42

## 2019-08-19 RX ADMIN — HYDROMORPHONE HYDROCHLORIDE 0.5 MG: 1 INJECTION, SOLUTION INTRAMUSCULAR; INTRAVENOUS; SUBCUTANEOUS at 22:34

## 2019-08-19 RX ADMIN — CARVEDILOL 12.5 MG: 12.5 TABLET, FILM COATED ORAL at 23:18

## 2019-08-19 RX ADMIN — PROPOFOL 150 MG: 10 INJECTION, EMULSION INTRAVENOUS at 15:50

## 2019-08-19 RX ADMIN — ROCURONIUM BROMIDE 40 MG: 10 INJECTION INTRAVENOUS at 15:50

## 2019-08-19 RX ADMIN — PHENYLEPHRINE HYDROCHLORIDE 100 MCG: 10 INJECTION INTRAVENOUS at 17:17

## 2019-08-19 RX ADMIN — SODIUM CHLORIDE, POTASSIUM CHLORIDE, SODIUM LACTATE AND CALCIUM CHLORIDE 9 ML/HR: 600; 310; 30; 20 INJECTION, SOLUTION INTRAVENOUS at 14:33

## 2019-08-19 RX ADMIN — PHENYLEPHRINE HYDROCHLORIDE 200 MCG: 10 INJECTION INTRAVENOUS at 16:05

## 2019-08-19 RX ADMIN — DEXAMETHASONE SODIUM PHOSPHATE 10 MG: 10 INJECTION INTRAMUSCULAR; INTRAVENOUS at 16:00

## 2019-08-19 RX ADMIN — PHENYLEPHRINE HYDROCHLORIDE 100 MCG: 10 INJECTION INTRAVENOUS at 17:03

## 2019-08-19 RX ADMIN — NEOSTIGMINE METHYLSULFATE 3 MG: 1 INJECTION INTRAMUSCULAR; INTRAVENOUS; SUBCUTANEOUS at 18:16

## 2019-08-19 RX ADMIN — SODIUM CHLORIDE, PRESERVATIVE FREE 3 ML: 5 INJECTION INTRAVENOUS at 08:24

## 2019-08-19 RX ADMIN — SODIUM CHLORIDE, POTASSIUM CHLORIDE, SODIUM LACTATE AND CALCIUM CHLORIDE 75 ML/HR: 600; 310; 30; 20 INJECTION, SOLUTION INTRAVENOUS at 20:27

## 2019-08-19 NOTE — ANESTHESIA POSTPROCEDURE EVALUATION
Patient: Baltazar MELO    Procedure Summary     Date:  08/19/19 Room / Location:  [facility] OR [address] [place] [address] MAIN OR    Anesthesia Start:  1540 Anesthesia Stop:  1835    Procedure:  Left Lumbar Three to Lumbar Four and Lumbar Four to Lumbar Five Decompression (Left Spine Lumbar) Diagnosis:       Lumbar back pain with radiculopathy affecting right lower extremity      (Lumbar back pain with radiculopathy affecting right lower extremity [M54.16])    Surgeon:  Abhijit Robertson MD Provider:  Kodi Flowers MD    Anesthesia Type:  general ASA Status:  3          Anesthesia Type: general  Last vitals  BP   121/66 (08/19/19 1850)   Temp   36.5 °C (97.7 °F) (08/19/19 1826)   Pulse   76 (08/19/19 1855)   Resp   16 (08/19/19 1850)     SpO2   100 % (08/19/19 1855)     Post Anesthesia Care and Evaluation    Patient location during evaluation: PACU  Patient participation: complete - patient participated  Level of consciousness: awake and alert  Pain management: adequate  Airway patency: patent  Anesthetic complications: No anesthetic complications  PONV Status: none  Cardiovascular status: acceptable  Respiratory status: acceptable  Hydration status: acceptable

## 2019-08-19 NOTE — ANESTHESIA PROCEDURE NOTES
Airway  Urgency: elective    Date/Time: 8/19/2019 3:59 PM    General Information and Staff    Patient location during procedure: OR  Anesthesiologist: Nova Cochran MD    Indications and Patient Condition  Indications for airway management: airway protection    Preoxygenated: yes  Mask difficulty assessment: 1 - vent by mask    Final Airway Details  Final airway type: endotracheal airway      Successful airway: ETT  Cuffed: yes   Successful intubation technique: direct laryngoscopy  Blade: Harry  Blade size: 3  Cormack-Lehane Classification: grade I - full view of glottis  Placement verified by: capnometry   Measured from: teeth  Number of attempts at approach: 1

## 2019-08-20 LAB
ALBUMIN SERPL-MCNC: 3.3 G/DL (ref 3.5–5.2)
ANION GAP SERPL CALCULATED.3IONS-SCNC: 15 MMOL/L (ref 5–15)
BUN BLD-MCNC: 32 MG/DL (ref 8–23)
BUN/CREAT SERPL: 23 (ref 7–25)
CALCIUM SPEC-SCNC: 8.9 MG/DL (ref 8.6–10.5)
CHLORIDE SERPL-SCNC: 95 MMOL/L (ref 98–107)
CO2 SERPL-SCNC: 21 MMOL/L (ref 22–29)
CREAT BLD-MCNC: 1.39 MG/DL (ref 0.57–1)
GFR SERPL CREATININE-BSD FRML MDRD: 38 ML/MIN/1.73
GLUCOSE BLD-MCNC: 154 MG/DL (ref 65–99)
GLUCOSE BLDC GLUCOMTR-MCNC: 137 MG/DL (ref 70–130)
GLUCOSE BLDC GLUCOMTR-MCNC: 140 MG/DL (ref 70–130)
GLUCOSE BLDC GLUCOMTR-MCNC: 153 MG/DL (ref 70–130)
GLUCOSE BLDC GLUCOMTR-MCNC: 192 MG/DL (ref 70–130)
PHOSPHATE SERPL-MCNC: 3.9 MG/DL (ref 2.5–4.5)
POTASSIUM BLD-SCNC: 4.6 MMOL/L (ref 3.5–5.2)
SODIUM BLD-SCNC: 131 MMOL/L (ref 136–145)

## 2019-08-20 PROCEDURE — 80069 RENAL FUNCTION PANEL: CPT | Performed by: INTERNAL MEDICINE

## 2019-08-20 PROCEDURE — 94799 UNLISTED PULMONARY SVC/PX: CPT

## 2019-08-20 PROCEDURE — 99232 SBSQ HOSP IP/OBS MODERATE 35: CPT | Performed by: INTERNAL MEDICINE

## 2019-08-20 PROCEDURE — 25010000002 VANCOMYCIN 10 G RECONSTITUTED SOLUTION: Performed by: NEUROLOGICAL SURGERY

## 2019-08-20 PROCEDURE — 99024 POSTOP FOLLOW-UP VISIT: CPT | Performed by: NEUROLOGICAL SURGERY

## 2019-08-20 PROCEDURE — 63710000001 INSULIN LISPRO (HUMAN) PER 5 UNITS: Performed by: NURSE PRACTITIONER

## 2019-08-20 PROCEDURE — 82962 GLUCOSE BLOOD TEST: CPT

## 2019-08-20 RX ADMIN — CYCLOBENZAPRINE 10 MG: 10 TABLET, FILM COATED ORAL at 04:10

## 2019-08-20 RX ADMIN — HYDROCODONE BITARTRATE AND ACETAMINOPHEN 1 TABLET: 5; 325 TABLET ORAL at 00:32

## 2019-08-20 RX ADMIN — HYDROCODONE BITARTRATE AND ACETAMINOPHEN 1 TABLET: 5; 325 TABLET ORAL at 23:19

## 2019-08-20 RX ADMIN — CYCLOBENZAPRINE 10 MG: 10 TABLET, FILM COATED ORAL at 14:57

## 2019-08-20 RX ADMIN — SODIUM CHLORIDE, PRESERVATIVE FREE 3 ML: 5 INJECTION INTRAVENOUS at 09:22

## 2019-08-20 RX ADMIN — MULTIPLE VITAMINS W/ MINERALS TAB 1 TABLET: TAB at 09:22

## 2019-08-20 RX ADMIN — SENNOSIDES AND DOCUSATE SODIUM 2 TABLET: 8.6; 5 TABLET ORAL at 21:08

## 2019-08-20 RX ADMIN — ATORVASTATIN CALCIUM 10 MG: 10 TABLET, FILM COATED ORAL at 21:08

## 2019-08-20 RX ADMIN — Medication 1000 MCG: at 09:22

## 2019-08-20 RX ADMIN — CYCLOBENZAPRINE 10 MG: 10 TABLET, FILM COATED ORAL at 23:19

## 2019-08-20 RX ADMIN — INSULIN LISPRO 2 UNITS: 100 INJECTION, SOLUTION INTRAVENOUS; SUBCUTANEOUS at 13:06

## 2019-08-20 RX ADMIN — HYDROCODONE BITARTRATE AND ACETAMINOPHEN 1 TABLET: 5; 325 TABLET ORAL at 09:26

## 2019-08-20 RX ADMIN — CARVEDILOL 12.5 MG: 12.5 TABLET, FILM COATED ORAL at 21:08

## 2019-08-20 RX ADMIN — VANCOMYCIN HYDROCHLORIDE 1750 MG: 10 INJECTION, POWDER, LYOPHILIZED, FOR SOLUTION INTRAVENOUS at 05:15

## 2019-08-20 RX ADMIN — CARVEDILOL 12.5 MG: 12.5 TABLET, FILM COATED ORAL at 09:23

## 2019-08-20 RX ADMIN — HYDROCODONE BITARTRATE AND ACETAMINOPHEN 1 TABLET: 5; 325 TABLET ORAL at 14:57

## 2019-08-20 RX ADMIN — SODIUM CHLORIDE, POTASSIUM CHLORIDE, SODIUM LACTATE AND CALCIUM CHLORIDE 9 ML/HR: 600; 310; 30; 20 INJECTION, SOLUTION INTRAVENOUS at 09:26

## 2019-08-20 RX ADMIN — INSULIN LISPRO 2 UNITS: 100 INJECTION, SOLUTION INTRAVENOUS; SUBCUTANEOUS at 19:04

## 2019-08-20 RX ADMIN — HYDROCODONE BITARTRATE AND ACETAMINOPHEN 1 TABLET: 5; 325 TABLET ORAL at 04:10

## 2019-08-20 RX ADMIN — HYDROCODONE BITARTRATE AND ACETAMINOPHEN 1 TABLET: 5; 325 TABLET ORAL at 19:05

## 2019-08-21 LAB
ANION GAP SERPL CALCULATED.3IONS-SCNC: 9.3 MMOL/L (ref 5–15)
BUN BLD-MCNC: 32 MG/DL (ref 8–23)
BUN/CREAT SERPL: 23.2 (ref 7–25)
CALCIUM SPEC-SCNC: 9 MG/DL (ref 8.6–10.5)
CHLORIDE SERPL-SCNC: 98 MMOL/L (ref 98–107)
CO2 SERPL-SCNC: 25.7 MMOL/L (ref 22–29)
CREAT BLD-MCNC: 1.38 MG/DL (ref 0.57–1)
DEPRECATED RDW RBC AUTO: 41.5 FL (ref 37–54)
ERYTHROCYTE [DISTWIDTH] IN BLOOD BY AUTOMATED COUNT: 14.2 % (ref 12.3–15.4)
GFR SERPL CREATININE-BSD FRML MDRD: 38 ML/MIN/1.73
GLUCOSE BLD-MCNC: 126 MG/DL (ref 65–99)
GLUCOSE BLDC GLUCOMTR-MCNC: 115 MG/DL (ref 70–130)
GLUCOSE BLDC GLUCOMTR-MCNC: 122 MG/DL (ref 70–130)
GLUCOSE BLDC GLUCOMTR-MCNC: 124 MG/DL (ref 70–130)
GLUCOSE BLDC GLUCOMTR-MCNC: 134 MG/DL (ref 70–130)
HCT VFR BLD AUTO: 33.5 % (ref 34–46.6)
HGB BLD-MCNC: 10.3 G/DL (ref 12–15.9)
MCH RBC QN AUTO: 25.2 PG (ref 26.6–33)
MCHC RBC AUTO-ENTMCNC: 30.7 G/DL (ref 31.5–35.7)
MCV RBC AUTO: 81.9 FL (ref 79–97)
PLATELET # BLD AUTO: 261 10*3/MM3 (ref 140–450)
PMV BLD AUTO: 10.6 FL (ref 6–12)
POTASSIUM BLD-SCNC: 4.2 MMOL/L (ref 3.5–5.2)
RBC # BLD AUTO: 4.09 10*6/MM3 (ref 3.77–5.28)
SODIUM BLD-SCNC: 133 MMOL/L (ref 136–145)
WBC NRBC COR # BLD: 7.66 10*3/MM3 (ref 3.4–10.8)

## 2019-08-21 PROCEDURE — 99024 POSTOP FOLLOW-UP VISIT: CPT | Performed by: NURSE PRACTITIONER

## 2019-08-21 PROCEDURE — 82962 GLUCOSE BLOOD TEST: CPT

## 2019-08-21 PROCEDURE — 85027 COMPLETE CBC AUTOMATED: CPT | Performed by: INTERNAL MEDICINE

## 2019-08-21 PROCEDURE — 80048 BASIC METABOLIC PNL TOTAL CA: CPT | Performed by: INTERNAL MEDICINE

## 2019-08-21 RX ADMIN — CYCLOBENZAPRINE 10 MG: 10 TABLET, FILM COATED ORAL at 16:41

## 2019-08-21 RX ADMIN — CARVEDILOL 12.5 MG: 12.5 TABLET, FILM COATED ORAL at 08:33

## 2019-08-21 RX ADMIN — ATORVASTATIN CALCIUM 10 MG: 10 TABLET, FILM COATED ORAL at 20:51

## 2019-08-21 RX ADMIN — CARVEDILOL 12.5 MG: 12.5 TABLET, FILM COATED ORAL at 20:51

## 2019-08-21 RX ADMIN — Medication 1000 MCG: at 08:32

## 2019-08-21 RX ADMIN — HYDROCODONE BITARTRATE AND ACETAMINOPHEN 1 TABLET: 5; 325 TABLET ORAL at 12:30

## 2019-08-21 RX ADMIN — SODIUM CHLORIDE, PRESERVATIVE FREE 3 ML: 5 INJECTION INTRAVENOUS at 20:51

## 2019-08-21 RX ADMIN — SODIUM CHLORIDE, POTASSIUM CHLORIDE, SODIUM LACTATE AND CALCIUM CHLORIDE 75 ML/HR: 600; 310; 30; 20 INJECTION, SOLUTION INTRAVENOUS at 01:53

## 2019-08-21 RX ADMIN — MULTIPLE VITAMINS W/ MINERALS TAB 1 TABLET: TAB at 08:32

## 2019-08-21 RX ADMIN — CYCLOBENZAPRINE 10 MG: 10 TABLET, FILM COATED ORAL at 07:55

## 2019-08-21 RX ADMIN — HYDROCODONE BITARTRATE AND ACETAMINOPHEN 1 TABLET: 5; 325 TABLET ORAL at 16:41

## 2019-08-21 RX ADMIN — HYDROCODONE BITARTRATE AND ACETAMINOPHEN 1 TABLET: 5; 325 TABLET ORAL at 20:51

## 2019-08-21 RX ADMIN — HYDROCODONE BITARTRATE AND ACETAMINOPHEN 1 TABLET: 5; 325 TABLET ORAL at 07:52

## 2019-08-22 LAB
ANION GAP SERPL CALCULATED.3IONS-SCNC: 9.6 MMOL/L (ref 5–15)
BUN BLD-MCNC: 25 MG/DL (ref 8–23)
BUN/CREAT SERPL: 21.6 (ref 7–25)
CALCIUM SPEC-SCNC: 9 MG/DL (ref 8.6–10.5)
CHLORIDE SERPL-SCNC: 97 MMOL/L (ref 98–107)
CO2 SERPL-SCNC: 25.4 MMOL/L (ref 22–29)
CREAT BLD-MCNC: 1.16 MG/DL (ref 0.57–1)
GFR SERPL CREATININE-BSD FRML MDRD: 47 ML/MIN/1.73
GLUCOSE BLD-MCNC: 97 MG/DL (ref 65–99)
GLUCOSE BLDC GLUCOMTR-MCNC: 100 MG/DL (ref 70–130)
GLUCOSE BLDC GLUCOMTR-MCNC: 125 MG/DL (ref 70–130)
GLUCOSE BLDC GLUCOMTR-MCNC: 142 MG/DL (ref 70–130)
GLUCOSE BLDC GLUCOMTR-MCNC: 172 MG/DL (ref 70–130)
HCT VFR BLD AUTO: 32.2 % (ref 34–46.6)
HGB BLD-MCNC: 9.7 G/DL (ref 12–15.9)
POTASSIUM BLD-SCNC: 4.4 MMOL/L (ref 3.5–5.2)
SODIUM BLD-SCNC: 132 MMOL/L (ref 136–145)

## 2019-08-22 PROCEDURE — 80048 BASIC METABOLIC PNL TOTAL CA: CPT | Performed by: INTERNAL MEDICINE

## 2019-08-22 PROCEDURE — 63710000001 INSULIN LISPRO (HUMAN) PER 5 UNITS: Performed by: NURSE PRACTITIONER

## 2019-08-22 PROCEDURE — 85018 HEMOGLOBIN: CPT | Performed by: INTERNAL MEDICINE

## 2019-08-22 PROCEDURE — 85014 HEMATOCRIT: CPT | Performed by: INTERNAL MEDICINE

## 2019-08-22 PROCEDURE — 99024 POSTOP FOLLOW-UP VISIT: CPT | Performed by: NURSE PRACTITIONER

## 2019-08-22 PROCEDURE — 82962 GLUCOSE BLOOD TEST: CPT

## 2019-08-22 PROCEDURE — 94799 UNLISTED PULMONARY SVC/PX: CPT

## 2019-08-22 PROCEDURE — 25010000002 HYDROMORPHONE PER 4 MG: Performed by: HOSPITALIST

## 2019-08-22 RX ADMIN — CYCLOBENZAPRINE 10 MG: 10 TABLET, FILM COATED ORAL at 00:43

## 2019-08-22 RX ADMIN — HYDROCODONE BITARTRATE AND ACETAMINOPHEN 2 TABLET: 5; 325 TABLET ORAL at 09:29

## 2019-08-22 RX ADMIN — SODIUM CHLORIDE, PRESERVATIVE FREE 3 ML: 5 INJECTION INTRAVENOUS at 09:32

## 2019-08-22 RX ADMIN — SODIUM CHLORIDE, PRESERVATIVE FREE 3 ML: 5 INJECTION INTRAVENOUS at 20:43

## 2019-08-22 RX ADMIN — MULTIPLE VITAMINS W/ MINERALS TAB 1 TABLET: TAB at 09:29

## 2019-08-22 RX ADMIN — INSULIN LISPRO 2 UNITS: 100 INJECTION, SOLUTION INTRAVENOUS; SUBCUTANEOUS at 20:44

## 2019-08-22 RX ADMIN — HYDROCODONE BITARTRATE AND ACETAMINOPHEN 1 TABLET: 5; 325 TABLET ORAL at 00:43

## 2019-08-22 RX ADMIN — CYCLOBENZAPRINE 10 MG: 10 TABLET, FILM COATED ORAL at 14:42

## 2019-08-22 RX ADMIN — Medication 1000 MCG: at 09:29

## 2019-08-22 RX ADMIN — ATORVASTATIN CALCIUM 10 MG: 10 TABLET, FILM COATED ORAL at 20:42

## 2019-08-22 RX ADMIN — HYDROCODONE BITARTRATE AND ACETAMINOPHEN 1 TABLET: 5; 325 TABLET ORAL at 04:46

## 2019-08-22 RX ADMIN — CARVEDILOL 12.5 MG: 12.5 TABLET, FILM COATED ORAL at 09:29

## 2019-08-22 RX ADMIN — HYDROCODONE BITARTRATE AND ACETAMINOPHEN 1 TABLET: 5; 325 TABLET ORAL at 18:43

## 2019-08-22 RX ADMIN — HYDROMORPHONE HYDROCHLORIDE 0.5 MG: 1 INJECTION, SOLUTION INTRAMUSCULAR; INTRAVENOUS; SUBCUTANEOUS at 21:03

## 2019-08-22 RX ADMIN — SENNOSIDES AND DOCUSATE SODIUM 2 TABLET: 8.6; 5 TABLET ORAL at 20:42

## 2019-08-22 RX ADMIN — CARVEDILOL 12.5 MG: 12.5 TABLET, FILM COATED ORAL at 20:42

## 2019-08-22 RX ADMIN — HYDROCODONE BITARTRATE AND ACETAMINOPHEN 1 TABLET: 5; 325 TABLET ORAL at 14:42

## 2019-08-23 ENCOUNTER — HOSPITAL ENCOUNTER (INPATIENT)
Facility: HOSPITAL | Age: 69
End: 2019-08-23
Attending: PHYSICAL MEDICINE & REHABILITATION | Admitting: PHYSICAL MEDICINE & REHABILITATION

## 2019-08-23 LAB
ALBUMIN SERPL-MCNC: 3.2 G/DL (ref 3.5–5.2)
ANION GAP SERPL CALCULATED.3IONS-SCNC: 7.8 MMOL/L (ref 5–15)
BUN BLD-MCNC: 23 MG/DL (ref 8–23)
BUN/CREAT SERPL: 23.7 (ref 7–25)
CALCIUM SPEC-SCNC: 8.4 MG/DL (ref 8.6–10.5)
CHLORIDE SERPL-SCNC: 96 MMOL/L (ref 98–107)
CO2 SERPL-SCNC: 28.2 MMOL/L (ref 22–29)
CREAT BLD-MCNC: 0.97 MG/DL (ref 0.57–1)
GFR SERPL CREATININE-BSD FRML MDRD: 57 ML/MIN/1.73
GLUCOSE BLD-MCNC: 117 MG/DL (ref 65–99)
GLUCOSE BLDC GLUCOMTR-MCNC: 110 MG/DL (ref 70–130)
GLUCOSE BLDC GLUCOMTR-MCNC: 114 MG/DL (ref 70–130)
GLUCOSE BLDC GLUCOMTR-MCNC: 119 MG/DL (ref 70–130)
GLUCOSE BLDC GLUCOMTR-MCNC: 126 MG/DL (ref 70–130)
PHOSPHATE SERPL-MCNC: 2.9 MG/DL (ref 2.5–4.5)
POTASSIUM BLD-SCNC: 3.9 MMOL/L (ref 3.5–5.2)
SODIUM BLD-SCNC: 132 MMOL/L (ref 136–145)

## 2019-08-23 PROCEDURE — 94799 UNLISTED PULMONARY SVC/PX: CPT

## 2019-08-23 PROCEDURE — 82962 GLUCOSE BLOOD TEST: CPT

## 2019-08-23 PROCEDURE — 99024 POSTOP FOLLOW-UP VISIT: CPT | Performed by: NURSE PRACTITIONER

## 2019-08-23 PROCEDURE — 80069 RENAL FUNCTION PANEL: CPT | Performed by: INTERNAL MEDICINE

## 2019-08-23 PROCEDURE — 97535 SELF CARE MNGMENT TRAINING: CPT

## 2019-08-23 PROCEDURE — 97110 THERAPEUTIC EXERCISES: CPT

## 2019-08-23 RX ADMIN — SODIUM CHLORIDE, PRESERVATIVE FREE 3 ML: 5 INJECTION INTRAVENOUS at 09:26

## 2019-08-23 RX ADMIN — Medication 1000 MCG: at 09:26

## 2019-08-23 RX ADMIN — CYCLOBENZAPRINE 10 MG: 10 TABLET, FILM COATED ORAL at 22:19

## 2019-08-23 RX ADMIN — HYDROCODONE BITARTRATE AND ACETAMINOPHEN 1 TABLET: 5; 325 TABLET ORAL at 07:00

## 2019-08-23 RX ADMIN — CARVEDILOL 12.5 MG: 12.5 TABLET, FILM COATED ORAL at 22:20

## 2019-08-23 RX ADMIN — CARVEDILOL 12.5 MG: 12.5 TABLET, FILM COATED ORAL at 09:26

## 2019-08-23 RX ADMIN — HYDROCODONE BITARTRATE AND ACETAMINOPHEN 1 TABLET: 5; 325 TABLET ORAL at 22:20

## 2019-08-23 RX ADMIN — MULTIPLE VITAMINS W/ MINERALS TAB 1 TABLET: TAB at 09:26

## 2019-08-23 RX ADMIN — ATORVASTATIN CALCIUM 10 MG: 10 TABLET, FILM COATED ORAL at 22:20

## 2019-08-23 RX ADMIN — HYDROCODONE BITARTRATE AND ACETAMINOPHEN 1 TABLET: 5; 325 TABLET ORAL at 12:37

## 2019-08-23 RX ADMIN — APIXABAN 5 MG: 5 TABLET, FILM COATED ORAL at 22:20

## 2019-08-23 RX ADMIN — CYCLOBENZAPRINE 10 MG: 10 TABLET, FILM COATED ORAL at 07:00

## 2019-08-23 RX ADMIN — APIXABAN 5 MG: 5 TABLET, FILM COATED ORAL at 14:26

## 2019-08-23 RX ADMIN — SODIUM CHLORIDE, PRESERVATIVE FREE 3 ML: 5 INJECTION INTRAVENOUS at 22:22

## 2019-08-24 LAB
ANION GAP SERPL CALCULATED.3IONS-SCNC: 11.2 MMOL/L (ref 5–15)
BUN BLD-MCNC: 22 MG/DL (ref 8–23)
BUN/CREAT SERPL: 20.4 (ref 7–25)
CALCIUM SPEC-SCNC: 9.2 MG/DL (ref 8.6–10.5)
CHLORIDE SERPL-SCNC: 97 MMOL/L (ref 98–107)
CO2 SERPL-SCNC: 27.8 MMOL/L (ref 22–29)
CREAT BLD-MCNC: 1.08 MG/DL (ref 0.57–1)
DEPRECATED RDW RBC AUTO: 41.6 FL (ref 37–54)
ERYTHROCYTE [DISTWIDTH] IN BLOOD BY AUTOMATED COUNT: 14.1 % (ref 12.3–15.4)
GFR SERPL CREATININE-BSD FRML MDRD: 51 ML/MIN/1.73
GLUCOSE BLD-MCNC: 103 MG/DL (ref 65–99)
GLUCOSE BLDC GLUCOMTR-MCNC: 141 MG/DL (ref 70–130)
GLUCOSE BLDC GLUCOMTR-MCNC: 146 MG/DL (ref 70–130)
GLUCOSE BLDC GLUCOMTR-MCNC: 160 MG/DL (ref 70–130)
GLUCOSE BLDC GLUCOMTR-MCNC: 94 MG/DL (ref 70–130)
HCT VFR BLD AUTO: 32.2 % (ref 34–46.6)
HGB BLD-MCNC: 9.9 G/DL (ref 12–15.9)
MCH RBC QN AUTO: 25.3 PG (ref 26.6–33)
MCHC RBC AUTO-ENTMCNC: 30.7 G/DL (ref 31.5–35.7)
MCV RBC AUTO: 82.1 FL (ref 79–97)
PLATELET # BLD AUTO: 273 10*3/MM3 (ref 140–450)
PMV BLD AUTO: 10.4 FL (ref 6–12)
POTASSIUM BLD-SCNC: 4 MMOL/L (ref 3.5–5.2)
RBC # BLD AUTO: 3.92 10*6/MM3 (ref 3.77–5.28)
SODIUM BLD-SCNC: 136 MMOL/L (ref 136–145)
WBC NRBC COR # BLD: 5.5 10*3/MM3 (ref 3.4–10.8)

## 2019-08-24 PROCEDURE — 85027 COMPLETE CBC AUTOMATED: CPT | Performed by: INTERNAL MEDICINE

## 2019-08-24 PROCEDURE — 97110 THERAPEUTIC EXERCISES: CPT

## 2019-08-24 PROCEDURE — 82962 GLUCOSE BLOOD TEST: CPT

## 2019-08-24 PROCEDURE — 80048 BASIC METABOLIC PNL TOTAL CA: CPT | Performed by: INTERNAL MEDICINE

## 2019-08-24 PROCEDURE — 63710000001 INSULIN LISPRO (HUMAN) PER 5 UNITS: Performed by: NURSE PRACTITIONER

## 2019-08-24 RX ADMIN — CYCLOBENZAPRINE 10 MG: 10 TABLET, FILM COATED ORAL at 18:16

## 2019-08-24 RX ADMIN — APIXABAN 5 MG: 5 TABLET, FILM COATED ORAL at 08:09

## 2019-08-24 RX ADMIN — CARVEDILOL 12.5 MG: 12.5 TABLET, FILM COATED ORAL at 22:53

## 2019-08-24 RX ADMIN — MULTIPLE VITAMINS W/ MINERALS TAB 1 TABLET: TAB at 08:09

## 2019-08-24 RX ADMIN — ATORVASTATIN CALCIUM 10 MG: 10 TABLET, FILM COATED ORAL at 22:53

## 2019-08-24 RX ADMIN — INSULIN LISPRO 2 UNITS: 100 INJECTION, SOLUTION INTRAVENOUS; SUBCUTANEOUS at 18:16

## 2019-08-24 RX ADMIN — CARVEDILOL 12.5 MG: 12.5 TABLET, FILM COATED ORAL at 08:09

## 2019-08-24 RX ADMIN — HYDROCODONE BITARTRATE AND ACETAMINOPHEN 1 TABLET: 5; 325 TABLET ORAL at 18:17

## 2019-08-24 RX ADMIN — SODIUM CHLORIDE, PRESERVATIVE FREE 3 ML: 5 INJECTION INTRAVENOUS at 22:58

## 2019-08-24 RX ADMIN — HYDROCODONE BITARTRATE AND ACETAMINOPHEN 1 TABLET: 5; 325 TABLET ORAL at 13:52

## 2019-08-24 RX ADMIN — APIXABAN 5 MG: 5 TABLET, FILM COATED ORAL at 22:53

## 2019-08-24 RX ADMIN — HYDROCODONE BITARTRATE AND ACETAMINOPHEN 1 TABLET: 5; 325 TABLET ORAL at 08:09

## 2019-08-24 RX ADMIN — CYCLOBENZAPRINE 10 MG: 10 TABLET, FILM COATED ORAL at 08:09

## 2019-08-24 RX ADMIN — Medication 1000 MCG: at 08:09

## 2019-08-24 RX ADMIN — SODIUM CHLORIDE, PRESERVATIVE FREE 3 ML: 5 INJECTION INTRAVENOUS at 08:10

## 2019-08-25 LAB
GLUCOSE BLDC GLUCOMTR-MCNC: 118 MG/DL (ref 70–130)
GLUCOSE BLDC GLUCOMTR-MCNC: 128 MG/DL (ref 70–130)
GLUCOSE BLDC GLUCOMTR-MCNC: 140 MG/DL (ref 70–130)
GLUCOSE BLDC GLUCOMTR-MCNC: 147 MG/DL (ref 70–130)

## 2019-08-25 PROCEDURE — 97530 THERAPEUTIC ACTIVITIES: CPT | Performed by: PHYSICAL THERAPIST

## 2019-08-25 PROCEDURE — 82962 GLUCOSE BLOOD TEST: CPT

## 2019-08-25 PROCEDURE — 94799 UNLISTED PULMONARY SVC/PX: CPT

## 2019-08-25 RX ADMIN — CYCLOBENZAPRINE 10 MG: 10 TABLET, FILM COATED ORAL at 08:47

## 2019-08-25 RX ADMIN — SODIUM CHLORIDE, PRESERVATIVE FREE 3 ML: 5 INJECTION INTRAVENOUS at 08:40

## 2019-08-25 RX ADMIN — POLYETHYLENE GLYCOL 3350 17 G: 17 POWDER, FOR SOLUTION ORAL at 08:59

## 2019-08-25 RX ADMIN — CARVEDILOL 12.5 MG: 12.5 TABLET, FILM COATED ORAL at 08:37

## 2019-08-25 RX ADMIN — HYDROCODONE BITARTRATE AND ACETAMINOPHEN 1 TABLET: 5; 325 TABLET ORAL at 16:41

## 2019-08-25 RX ADMIN — SODIUM CHLORIDE, PRESERVATIVE FREE 3 ML: 5 INJECTION INTRAVENOUS at 20:56

## 2019-08-25 RX ADMIN — SENNOSIDES AND DOCUSATE SODIUM 2 TABLET: 8.6; 5 TABLET ORAL at 20:56

## 2019-08-25 RX ADMIN — CYCLOBENZAPRINE 10 MG: 10 TABLET, FILM COATED ORAL at 16:40

## 2019-08-25 RX ADMIN — APIXABAN 5 MG: 5 TABLET, FILM COATED ORAL at 20:56

## 2019-08-25 RX ADMIN — MULTIPLE VITAMINS W/ MINERALS TAB 1 TABLET: TAB at 08:37

## 2019-08-25 RX ADMIN — Medication 1000 MCG: at 08:37

## 2019-08-25 RX ADMIN — HYDROCODONE BITARTRATE AND ACETAMINOPHEN 1 TABLET: 5; 325 TABLET ORAL at 08:47

## 2019-08-25 RX ADMIN — CARVEDILOL 12.5 MG: 12.5 TABLET, FILM COATED ORAL at 20:56

## 2019-08-25 RX ADMIN — APIXABAN 5 MG: 5 TABLET, FILM COATED ORAL at 08:37

## 2019-08-25 RX ADMIN — ATORVASTATIN CALCIUM 10 MG: 10 TABLET, FILM COATED ORAL at 20:56

## 2019-08-26 LAB
ANION GAP SERPL CALCULATED.3IONS-SCNC: 9.7 MMOL/L (ref 5–15)
BUN BLD-MCNC: 19 MG/DL (ref 8–23)
BUN/CREAT SERPL: 17 (ref 7–25)
CALCIUM SPEC-SCNC: 9.2 MG/DL (ref 8.6–10.5)
CHLORIDE SERPL-SCNC: 95 MMOL/L (ref 98–107)
CO2 SERPL-SCNC: 27.3 MMOL/L (ref 22–29)
CREAT BLD-MCNC: 1.12 MG/DL (ref 0.57–1)
GFR SERPL CREATININE-BSD FRML MDRD: 49 ML/MIN/1.73
GLUCOSE BLD-MCNC: 114 MG/DL (ref 65–99)
GLUCOSE BLDC GLUCOMTR-MCNC: 109 MG/DL (ref 70–130)
GLUCOSE BLDC GLUCOMTR-MCNC: 114 MG/DL (ref 70–130)
GLUCOSE BLDC GLUCOMTR-MCNC: 132 MG/DL (ref 70–130)
GLUCOSE BLDC GLUCOMTR-MCNC: 155 MG/DL (ref 70–130)
POTASSIUM BLD-SCNC: 3.8 MMOL/L (ref 3.5–5.2)
SODIUM BLD-SCNC: 132 MMOL/L (ref 136–145)

## 2019-08-26 PROCEDURE — 97535 SELF CARE MNGMENT TRAINING: CPT

## 2019-08-26 PROCEDURE — 80048 BASIC METABOLIC PNL TOTAL CA: CPT | Performed by: INTERNAL MEDICINE

## 2019-08-26 PROCEDURE — 94799 UNLISTED PULMONARY SVC/PX: CPT

## 2019-08-26 PROCEDURE — 63710000001 INSULIN LISPRO (HUMAN) PER 5 UNITS: Performed by: NURSE PRACTITIONER

## 2019-08-26 PROCEDURE — 82962 GLUCOSE BLOOD TEST: CPT

## 2019-08-26 PROCEDURE — 97110 THERAPEUTIC EXERCISES: CPT

## 2019-08-26 RX ADMIN — CYCLOBENZAPRINE 10 MG: 10 TABLET, FILM COATED ORAL at 08:31

## 2019-08-26 RX ADMIN — POLYETHYLENE GLYCOL 3350 17 G: 17 POWDER, FOR SOLUTION ORAL at 08:31

## 2019-08-26 RX ADMIN — ATORVASTATIN CALCIUM 10 MG: 10 TABLET, FILM COATED ORAL at 21:31

## 2019-08-26 RX ADMIN — APIXABAN 5 MG: 5 TABLET, FILM COATED ORAL at 21:33

## 2019-08-26 RX ADMIN — SODIUM CHLORIDE, PRESERVATIVE FREE 3 ML: 5 INJECTION INTRAVENOUS at 21:33

## 2019-08-26 RX ADMIN — SENNOSIDES AND DOCUSATE SODIUM 2 TABLET: 8.6; 5 TABLET ORAL at 21:31

## 2019-08-26 RX ADMIN — CARVEDILOL 12.5 MG: 12.5 TABLET, FILM COATED ORAL at 21:31

## 2019-08-26 RX ADMIN — HYDROCODONE BITARTRATE AND ACETAMINOPHEN 1 TABLET: 5; 325 TABLET ORAL at 17:25

## 2019-08-26 RX ADMIN — MULTIPLE VITAMINS W/ MINERALS TAB 1 TABLET: TAB at 08:31

## 2019-08-26 RX ADMIN — SODIUM CHLORIDE, PRESERVATIVE FREE 3 ML: 5 INJECTION INTRAVENOUS at 08:31

## 2019-08-26 RX ADMIN — INSULIN LISPRO 2 UNITS: 100 INJECTION, SOLUTION INTRAVENOUS; SUBCUTANEOUS at 21:31

## 2019-08-26 RX ADMIN — APIXABAN 5 MG: 5 TABLET, FILM COATED ORAL at 08:31

## 2019-08-26 RX ADMIN — HYDROCODONE BITARTRATE AND ACETAMINOPHEN 1 TABLET: 5; 325 TABLET ORAL at 13:11

## 2019-08-26 RX ADMIN — CYCLOBENZAPRINE 10 MG: 10 TABLET, FILM COATED ORAL at 17:25

## 2019-08-26 RX ADMIN — CARVEDILOL 12.5 MG: 12.5 TABLET, FILM COATED ORAL at 08:32

## 2019-08-26 RX ADMIN — Medication 1000 MCG: at 08:31

## 2019-08-26 RX ADMIN — HYDROCODONE BITARTRATE AND ACETAMINOPHEN 1 TABLET: 5; 325 TABLET ORAL at 08:31

## 2019-08-27 ENCOUNTER — TELEPHONE (OUTPATIENT)
Dept: NEUROSURGERY | Facility: CLINIC | Age: 69
End: 2019-08-27

## 2019-08-27 LAB
GLUCOSE BLDC GLUCOMTR-MCNC: 114 MG/DL (ref 70–130)
GLUCOSE BLDC GLUCOMTR-MCNC: 120 MG/DL (ref 70–130)
GLUCOSE BLDC GLUCOMTR-MCNC: 155 MG/DL (ref 70–130)
GLUCOSE BLDC GLUCOMTR-MCNC: 94 MG/DL (ref 70–130)

## 2019-08-27 PROCEDURE — 82962 GLUCOSE BLOOD TEST: CPT

## 2019-08-27 PROCEDURE — 94799 UNLISTED PULMONARY SVC/PX: CPT

## 2019-08-27 PROCEDURE — 63710000001 INSULIN LISPRO (HUMAN) PER 5 UNITS: Performed by: NURSE PRACTITIONER

## 2019-08-27 RX ORDER — SENNA AND DOCUSATE SODIUM 50; 8.6 MG/1; MG/1
2 TABLET, FILM COATED ORAL NIGHTLY
Start: 2019-08-27

## 2019-08-27 RX ORDER — MAGNESIUM CARB/ALUMINUM HYDROX 105-160MG
296 TABLET,CHEWABLE ORAL ONCE AS NEEDED
Status: COMPLETED | OUTPATIENT
Start: 2019-08-27 | End: 2019-08-27

## 2019-08-27 RX ORDER — HYDROCODONE BITARTRATE AND ACETAMINOPHEN 5; 325 MG/1; MG/1
1 TABLET ORAL EVERY 4 HOURS PRN
Start: 2019-08-27

## 2019-08-27 RX ADMIN — APIXABAN 5 MG: 5 TABLET, FILM COATED ORAL at 10:22

## 2019-08-27 RX ADMIN — Medication 296 ML: at 20:38

## 2019-08-27 RX ADMIN — CARVEDILOL 12.5 MG: 12.5 TABLET, FILM COATED ORAL at 10:22

## 2019-08-27 RX ADMIN — CYCLOBENZAPRINE 10 MG: 10 TABLET, FILM COATED ORAL at 10:25

## 2019-08-27 RX ADMIN — CARVEDILOL 12.5 MG: 12.5 TABLET, FILM COATED ORAL at 20:38

## 2019-08-27 RX ADMIN — INSULIN LISPRO 2 UNITS: 100 INJECTION, SOLUTION INTRAVENOUS; SUBCUTANEOUS at 22:09

## 2019-08-27 RX ADMIN — SODIUM CHLORIDE, PRESERVATIVE FREE 3 ML: 5 INJECTION INTRAVENOUS at 20:38

## 2019-08-27 RX ADMIN — MULTIPLE VITAMINS W/ MINERALS TAB 1 TABLET: TAB at 10:25

## 2019-08-27 RX ADMIN — APIXABAN 5 MG: 5 TABLET, FILM COATED ORAL at 20:38

## 2019-08-27 RX ADMIN — ATORVASTATIN CALCIUM 10 MG: 10 TABLET, FILM COATED ORAL at 20:38

## 2019-08-27 RX ADMIN — HYDROCODONE BITARTRATE AND ACETAMINOPHEN 1 TABLET: 5; 325 TABLET ORAL at 10:25

## 2019-08-27 RX ADMIN — SENNOSIDES AND DOCUSATE SODIUM 2 TABLET: 8.6; 5 TABLET ORAL at 20:38

## 2019-08-27 RX ADMIN — SODIUM CHLORIDE, PRESERVATIVE FREE 3 ML: 5 INJECTION INTRAVENOUS at 10:21

## 2019-08-27 RX ADMIN — Medication 1000 MCG: at 10:24

## 2019-08-28 VITALS
DIASTOLIC BLOOD PRESSURE: 77 MMHG | RESPIRATION RATE: 18 BRPM | HEIGHT: 65 IN | OXYGEN SATURATION: 99 % | TEMPERATURE: 97.9 F | BODY MASS INDEX: 39.67 KG/M2 | WEIGHT: 238.1 LBS | SYSTOLIC BLOOD PRESSURE: 122 MMHG | HEART RATE: 77 BPM

## 2019-08-28 LAB
GLUCOSE BLDC GLUCOMTR-MCNC: 107 MG/DL (ref 70–130)
GLUCOSE BLDC GLUCOMTR-MCNC: 125 MG/DL (ref 70–130)

## 2019-08-28 PROCEDURE — 82962 GLUCOSE BLOOD TEST: CPT

## 2019-08-28 PROCEDURE — 94799 UNLISTED PULMONARY SVC/PX: CPT

## 2019-08-28 PROCEDURE — 97110 THERAPEUTIC EXERCISES: CPT

## 2019-08-28 RX ADMIN — MULTIPLE VITAMINS W/ MINERALS TAB 1 TABLET: TAB at 09:45

## 2019-08-28 RX ADMIN — CARVEDILOL 12.5 MG: 12.5 TABLET, FILM COATED ORAL at 09:44

## 2019-08-28 RX ADMIN — HYDROCODONE BITARTRATE AND ACETAMINOPHEN 1 TABLET: 5; 325 TABLET ORAL at 13:58

## 2019-08-28 RX ADMIN — APIXABAN 5 MG: 5 TABLET, FILM COATED ORAL at 09:44

## 2019-08-28 RX ADMIN — CYCLOBENZAPRINE 10 MG: 10 TABLET, FILM COATED ORAL at 09:52

## 2019-08-28 RX ADMIN — SODIUM CHLORIDE, PRESERVATIVE FREE 3 ML: 5 INJECTION INTRAVENOUS at 09:00

## 2019-08-28 RX ADMIN — HYDROCODONE BITARTRATE AND ACETAMINOPHEN 1 TABLET: 5; 325 TABLET ORAL at 09:44

## 2019-08-28 RX ADMIN — Medication 1000 MCG: at 09:44

## 2019-08-31 PROCEDURE — 0298T HOLTER MONITOR - 72 HOUR UP TO 21 DAY: CPT | Performed by: INTERNAL MEDICINE

## 2019-09-13 ENCOUNTER — LAB REQUISITION (OUTPATIENT)
Dept: LAB | Facility: HOSPITAL | Age: 69
End: 2019-09-13

## 2019-09-13 DIAGNOSIS — Z00.00 ROUTINE GENERAL MEDICAL EXAMINATION AT A HEALTH CARE FACILITY: ICD-10-CM

## 2019-09-13 LAB
ANION GAP SERPL CALCULATED.3IONS-SCNC: 19.6 MMOL/L (ref 5–15)
BASOPHILS # BLD AUTO: 0.01 10*3/MM3 (ref 0–0.2)
BASOPHILS NFR BLD AUTO: 0.2 % (ref 0–1.5)
BUN BLD-MCNC: 48 MG/DL (ref 8–23)
BUN/CREAT SERPL: 33.8 (ref 7–25)
CALCIUM SPEC-SCNC: 9.5 MG/DL (ref 8.6–10.5)
CHLORIDE SERPL-SCNC: 87 MMOL/L (ref 98–107)
CO2 SERPL-SCNC: 28.4 MMOL/L (ref 22–29)
CREAT BLD-MCNC: 1.42 MG/DL (ref 0.57–1)
DEPRECATED RDW RBC AUTO: 41.6 FL (ref 37–54)
EOSINOPHIL # BLD AUTO: 0.16 10*3/MM3 (ref 0–0.4)
EOSINOPHIL NFR BLD AUTO: 3.7 % (ref 0.3–6.2)
ERYTHROCYTE [DISTWIDTH] IN BLOOD BY AUTOMATED COUNT: 14.3 % (ref 12.3–15.4)
GFR SERPL CREATININE-BSD FRML MDRD: 37 ML/MIN/1.73
GLUCOSE BLD-MCNC: 160 MG/DL (ref 65–99)
HCT VFR BLD AUTO: 37.7 % (ref 34–46.6)
HGB BLD-MCNC: 11.7 G/DL (ref 12–15.9)
IMM GRANULOCYTES # BLD AUTO: 0.01 10*3/MM3 (ref 0–0.05)
IMM GRANULOCYTES NFR BLD AUTO: 0.2 % (ref 0–0.5)
LYMPHOCYTES # BLD AUTO: 0.59 10*3/MM3 (ref 0.7–3.1)
LYMPHOCYTES NFR BLD AUTO: 13.8 % (ref 19.6–45.3)
MCH RBC QN AUTO: 25.2 PG (ref 26.6–33)
MCHC RBC AUTO-ENTMCNC: 31 G/DL (ref 31.5–35.7)
MCV RBC AUTO: 81.3 FL (ref 79–97)
MONOCYTES # BLD AUTO: 0.48 10*3/MM3 (ref 0.1–0.9)
MONOCYTES NFR BLD AUTO: 11.2 % (ref 5–12)
NEUTROPHILS # BLD AUTO: 3.04 10*3/MM3 (ref 1.7–7)
NEUTROPHILS NFR BLD AUTO: 70.9 % (ref 42.7–76)
NRBC BLD AUTO-RTO: 0 /100 WBC (ref 0–0.2)
PLATELET # BLD AUTO: 307 10*3/MM3 (ref 140–450)
PMV BLD AUTO: 10.6 FL (ref 6–12)
POTASSIUM BLD-SCNC: 3.2 MMOL/L (ref 3.5–5.2)
RBC # BLD AUTO: 4.64 10*6/MM3 (ref 3.77–5.28)
SODIUM BLD-SCNC: 135 MMOL/L (ref 136–145)
WBC NRBC COR # BLD: 4.29 10*3/MM3 (ref 3.4–10.8)

## 2019-09-13 PROCEDURE — 80048 BASIC METABOLIC PNL TOTAL CA: CPT

## 2019-09-13 PROCEDURE — 85025 COMPLETE CBC W/AUTO DIFF WBC: CPT

## 2019-09-23 ENCOUNTER — OFFICE VISIT (OUTPATIENT)
Dept: NEUROSURGERY | Facility: CLINIC | Age: 69
End: 2019-09-23

## 2019-09-23 VITALS
HEIGHT: 65 IN | TEMPERATURE: 96.3 F | SYSTOLIC BLOOD PRESSURE: 140 MMHG | BODY MASS INDEX: 39.62 KG/M2 | RESPIRATION RATE: 16 BRPM | DIASTOLIC BLOOD PRESSURE: 90 MMHG | HEART RATE: 88 BPM

## 2019-09-23 DIAGNOSIS — R29.898 BILATERAL LEG WEAKNESS: ICD-10-CM

## 2019-09-23 DIAGNOSIS — Z09 POSTOP CHECK: Primary | ICD-10-CM

## 2019-09-23 PROBLEM — M48.062 SPINAL STENOSIS, LUMBAR REGION, WITH NEUROGENIC CLAUDICATION: Status: RESOLVED | Noted: 2019-08-15 | Resolved: 2019-09-23

## 2019-09-23 PROCEDURE — 99024 POSTOP FOLLOW-UP VISIT: CPT | Performed by: NURSE PRACTITIONER

## 2019-09-23 RX ORDER — INSULIN ASPART 100 [IU]/ML
INJECTION, SOLUTION INTRAVENOUS; SUBCUTANEOUS
Refills: 0 | COMMUNITY
Start: 2019-09-19 | End: 2019-10-01

## 2019-09-23 RX ORDER — POTASSIUM CHLORIDE 750 MG/1
TABLET, FILM COATED, EXTENDED RELEASE ORAL DAILY
Refills: 0 | COMMUNITY
Start: 2019-09-19 | End: 2019-10-15 | Stop reason: SDUPTHER

## 2019-09-23 RX ORDER — AMINO ACIDS/PROTEIN HYDROLYS 15G-100/30
30 LIQUID (ML) ORAL 2 TIMES DAILY
COMMUNITY
End: 2019-10-01

## 2019-09-23 RX ORDER — BLOOD SUGAR DIAGNOSTIC
STRIP MISCELLANEOUS
Refills: 5 | COMMUNITY
Start: 2019-06-26

## 2019-09-23 RX ORDER — ACETAMINOPHEN 325 MG/1
975 TABLET ORAL
COMMUNITY
Start: 2017-12-01

## 2019-09-23 RX ORDER — FLURBIPROFEN SODIUM 0.3 MG/ML
SOLUTION/ DROPS OPHTHALMIC
Refills: 0 | COMMUNITY
Start: 2019-09-19

## 2019-09-23 RX ORDER — PIOGLITAZONEHYDROCHLORIDE 15 MG/1
TABLET ORAL
COMMUNITY
Start: 2019-05-16

## 2019-09-23 RX ORDER — CLINDAMYCIN HYDROCHLORIDE 300 MG/1
CAPSULE ORAL
COMMUNITY
Start: 2019-01-28

## 2019-09-23 NOTE — PROGRESS NOTES
" HPI:   Baltazar MELO is a 69 y.o. female for follow-up of severe lumbar spinal stenosis at L3/4, L4/5 with disc herniation at L3/4.  This resulted in intractable leg pain and LE weakness.  She is status post L3/4, L4/5 decompression on August 19, 2019.  Intraoperatively there was found to be eroded dura at L4/5.  This was repaired with DuraGen and Tisseel.  Patient did well postoperatively with resolution of leg pain and improvement in weakness.  She continued to have weakness in was discharged to subacute rehab.  She was discharged 1 week ago. She will begin home health PT/OT this week. She has not needed pain medications in past few days. Strength improving R>L. They were using electrical stimulation LLE- helped. She is able to stand and transition/takes a few steps. No pain or numbness in legs; still some numbness in feet. No back pain. No B/B issues.     She presents unaccompanied to the exam room.  She was brought by family who is waiting per her request.    Review of Systems   Constitutional: Negative for chills and fever.   Gastrointestinal: Negative for constipation.   Genitourinary: Negative for difficulty urinating and enuresis.   Musculoskeletal: Positive for gait problem. Negative for back pain.   Neurological: Positive for weakness (bilateral legs) and numbness (bilateral feet).   Psychiatric/Behavioral: Negative for sleep disturbance.        /90 (BP Location: Left arm, Patient Position: Sitting, Cuff Size: Large Adult)   Pulse 88   Temp 96.3 °F (35.7 °C)   Resp 16   Ht 165.1 cm (65\")   BMI 39.62 kg/m²     Physical Exam   Constitutional: She appears well-developed and well-nourished.   Pulmonary/Chest: Effort normal.   Musculoskeletal:        Lumbar back: She exhibits no tenderness, no bony tenderness and no pain ( Negative straight leg raise).   Neurological: She is alert.   Reflex Scores:       Patellar reflexes are 1+ on the right side and 1+ on the left side.  Skin: Skin is warm and " dry.   Midline lumbar incision well approximated with no redness drainage or swelling.  There is a small scab at the midpoint of the incision.  This was removed.  The underlying tissue is healed.   Vitals reviewed.    Neurologic Exam     Mental Status   Level of consciousness: alert  Knowledge: good.   Normal comprehension.     Motor Exam   Muscle bulk: normal  Right leg tone: normal  Left leg tone: decreased (Ankle)  Bilateral iliopsoas 5-/5, right quad 5/5, left quad 4+/5, right hamstring 5/5, left hamstrings 3/5, right TA 4/5, left TA 3/5, right gastroc 5/5, left gastroc 4/5, bilateral and adductors 5/5, left abductor 4/5, right abductor 5/5, right EHL 4/5, left EHL 2/5     Sensory Exam   Right leg light touch: normal  Left leg light touch: normal  Right leg vibration: normal  Left leg vibration: normal  Right leg proprioception: normal  Left leg proprioception: normal  Right leg pinprick: normal  Left leg pinprick: normal    Temperature intact bilateral lower extremities  Able to distinguish pinprick from dull bilateral lower extremities     Gait, Coordination, and Reflexes     Gait  Gait: (Not ambulated this patient presents with a wheelchair and states she is only able to stand to sit and did not bring walker with her.)    Reflexes   Right patellar: 1+  Left patellar: 1+      Findings/Results:  No new imaging    Assessment/Plan:  Baltazar was seen today for hfu p/o l3/4 l4/5 decomp 8/19.    Diagnoses and all orders for this visit:    Postop check  -     Ambulatory Referral to Physical Medicine Rehab    Bilateral leg weakness  -     Ambulatory Referral to Physical Medicine Rehab      Discussion/Summary  She presents for follow-up now 5 weeks from 2 level lumbar decompression after presentation of prolonged weakness and leg pain.  Fortunately, she did not develop any bowel or bladder trouble.  Surgery revealed incidentally found eroded dura at L4/5 that was repaired.  She did not develop any postural headache.   "She was discharged to rehab and is now at home.  She is getting ready to begin home health.  She is doing quite well.  She is very pleased with her progress.  She is able to stand and take a couple of steps with assistance.  Her strength exam shows improvement as compared to both preoperative as well as postoperative in the hospital.  Her wound is healing well.  She has no back pain and no real leg pain.  She gets post therapy aches and pains.  She complains of numbness in her feet, but on exam she has complete sensation objectively.    Recommend the patient begin her therapies.  I believe she would benefit from being followed by physical medicine and rehab on a regular basis.  I will make referral to Dr. Tapia.  I have given her a temporary handicap tag, but any ongoing requests for handicap permit should be through physical medicine rehab or her family doctor.  We will see her back in 8 weeks to evaluate her progress.  She will call meantime with any wound issues or changes in strength or pain.    Plan: Return in about 2 months (around 11/23/2019) for Follow-up with NP.         Patient Care Team    Patient Care Team:  Arabella Sena MD as PCP - General (Family Medicine)  Provider, No Known as PCP - Family Medicine    Laine De Anda, APRN  9/23/2019    \"Dictated utilizing Dragon dictation\".    "

## 2019-09-29 DIAGNOSIS — I10 ESSENTIAL HYPERTENSION: ICD-10-CM

## 2019-09-30 RX ORDER — CARVEDILOL 6.25 MG/1
TABLET ORAL
Qty: 180 TABLET | Refills: 2 | Status: SHIPPED | OUTPATIENT
Start: 2019-09-30 | End: 2020-05-18 | Stop reason: SDUPTHER

## 2019-10-01 ENCOUNTER — OFFICE VISIT (OUTPATIENT)
Dept: CARDIOLOGY | Facility: CLINIC | Age: 69
End: 2019-10-01

## 2019-10-01 VITALS
HEIGHT: 65 IN | BODY MASS INDEX: 36.49 KG/M2 | WEIGHT: 219 LBS | SYSTOLIC BLOOD PRESSURE: 160 MMHG | HEART RATE: 101 BPM | DIASTOLIC BLOOD PRESSURE: 88 MMHG

## 2019-10-01 DIAGNOSIS — I48.19 OTHER PERSISTENT ATRIAL FIBRILLATION (HCC): ICD-10-CM

## 2019-10-01 DIAGNOSIS — I10 ESSENTIAL HYPERTENSION: Primary | ICD-10-CM

## 2019-10-01 PROBLEM — I48.0 PAROXYSMAL ATRIAL FIBRILLATION (HCC): Status: ACTIVE | Noted: 2019-08-13

## 2019-10-01 PROCEDURE — 93000 ELECTROCARDIOGRAM COMPLETE: CPT | Performed by: NURSE PRACTITIONER

## 2019-10-01 PROCEDURE — 99213 OFFICE O/P EST LOW 20 MIN: CPT | Performed by: NURSE PRACTITIONER

## 2019-10-01 NOTE — PROGRESS NOTES
Date of Office Visit: 10/01/2019  Encounter Provider: CAROLA Alex  Place of Service: [facility] CARDIOLOGY  Patient Name: Baltazar MELO  :1950    Chief Complaint   Patient presents with   • Atrial Fibrillation   :     HPI: Baltazar MELO is a 69 y.o. female, new to me, who presents today for follow-up.  Old records have been obtained and reviewed by me.  She is a patient of Dr. Farah's with a past cardiac history significant for hypertension.  She was last seen in the office by Dr. Farah on 2018 at which time she was doing well with no complaints of angina or heart failure.  She was encouraged to cut back on her salt intake and increase her physical activity.  She was advised to follow-up in 1 year.  In August of this year, she was admitted for atrial fibrillation and acute kidney injury.  The decision was made to take the rate control and anticoagulation approach.  She did have an echocardiogram during this hospitalization which revealed normal LV function with an EF of 61%, a mildly dilated left atrial cavity, and mild to moderate mitral valve regurgitation.  She wore a Holter monitor which revealed predominant rhythm of atrial fibrillation with an average heart rate of 77.  On 2019, she underwent lumbar decompressive surgery with Dr. Robertson.  She was started on apixaban postoperatively.  On 2019 she was stable for discharge, and I am seeing her today for follow-up.   Over the past month, she has overall been doing well from a cardiac standpoint.  She denies any chest pain, shortness of air, palpitations, lightheadedness, or syncope.  She denies any bleeding difficulties or melena.  She does have some mild lower extremity swelling that she attributes to her immobility after surgery.  Her PCP has recommended compression hose.  Initially after discharge she went to rehabilitation and she is now participating in home care with occupational and  physical therapy.  She does have a blood pressure log with her today reporting that her blood pressure has been in the 130s to 140s systolic over the 60s diastolic.  It was also well controlled at her PCP recently.  Her heart rate has been in the 70s to 80s at home.    Past Medical History:   Diagnosis Date   • A-fib (CMS/[facility])    • Arthritis    • Creatinine elevation    • Diabetes mellitus (CMS/[facility])    • Elevated cholesterol    • Health care maintenance    • Hyperlipidemia    • Hypertension    • Obesity    • PVC (premature ventricular contraction)        Past Surgical History:   Procedure Laterality Date   • BREAST CYST EXCISION Left    • BREAST SURGERY Bilateral     Reduction   • CHOLECYSTECTOMY     • HYSTERECTOMY     • LUMBAR DISCECTOMY Left 2019    Procedure: Left Lumbar Three to Lumbar Four and Lumbar Four to Lumbar Five Decompression;  Surgeon: Abhijit Robertson MD;  Location: [facility];  Service: Neurosurgery       Social History     Socioeconomic History   • Marital status:      Spouse name: Not on file   • Number of children: Not on file   • Years of education: Not on file   • Highest education level: Not on file   Occupational History   • Occupation: retired   Tobacco Use   • Smoking status: Former Smoker     Packs/day: 0.25     Years: 0.50     Pack years: 0.12     Types: Cigarettes     Last attempt to quit: 1972     Years since quittin.7   • Smokeless tobacco: Never Used   Substance and Sexual Activity   • Alcohol use: No   • Drug use: No   • Sexual activity: Defer       Family History   Problem Relation Age of Onset   • Heart disease Father    • Cancer Mother    • No Known Problems Maternal Grandmother    • No Known Problems Maternal Grandfather    • No Known Problems Paternal Grandmother    • No Known Problems Paternal Grandfather        Review of Systems   Constitution: Positive for malaise/fatigue. Negative for chills and fever.   Cardiovascular: Positive for leg swelling.  Negative for chest pain, dyspnea on exertion, near-syncope, orthopnea, palpitations, paroxysmal nocturnal dyspnea and syncope.   Respiratory: Negative for cough and shortness of breath.    Musculoskeletal: Negative for joint pain, joint swelling and myalgias.   Gastrointestinal: Negative for abdominal pain, diarrhea, melena, nausea and vomiting.   Genitourinary: Negative for frequency and hematuria.   Neurological: Negative for light-headedness, numbness, paresthesias and seizures.   Allergic/Immunologic: Negative.    All other systems reviewed and are negative.      Allergies   Allergen Reactions   • Adhesive Tape Dermatitis   • Cefaclor Dizziness   • Ibuprofen Other (See Comments)     Kidney dysfunction    • Penicillins Dizziness   • Ascorbic Acid & Derivatives Other (See Comments)     Epistaxis   • Latex Rash         Current Outpatient Medications:   •  ACCU-CHEK LEE PLUS test strip, USE 1 STRIP D AS INSTRUCTED, Disp: , Rfl: 5  •  acetaminophen (TYLENOL) 325 MG tablet, Take 975 mg by mouth., Disp: , Rfl:   •  apixaban (ELIQUIS) 5 MG tablet tablet, Take 1 tablet by mouth Every 12 (Twelve) Hours., Disp: 60 tablet, Rfl:   •  aspirin 81 MG tablet, Take 1 tablet by mouth daily., Disp: , Rfl:   •  atorvastatin (LIPITOR) 10 MG tablet, Take 1 tablet by mouth daily., Disp: , Rfl:   •  B-D UF III MINI PEN NEEDLES 31G X 5 MM misc, INJECTING QID, Disp: , Rfl: 0  •  BIOTIN PO, Take 1 tablet by mouth Daily., Disp: , Rfl:   •  carvedilol (COREG) 6.25 MG tablet, TAKE 1 TABLET BY MOUTH TWICE DAILY, Disp: 180 tablet, Rfl: 2  •  chlorthalidone (HYGROTON) 25 MG tablet, TAKE 1 TABLET BY MOUTH EVERY MORNING, Disp: 90 tablet, Rfl: 3  •  cholecalciferol (VITAMIN D3) 1000 units tablet, Take 1,000 Units by mouth Daily., Disp: , Rfl:   •  clindamycin (CLEOCIN) 300 MG capsule, TAKE 2 CAPSULES BY MOUTH 1 HOUR BEFORE DENTAL PROCEDURE, Disp: , Rfl:   •  cyclobenzaprine (FLEXERIL) 10 MG tablet, Take 10 mg by mouth 3 (Three) Times a Day As  "Needed for Muscle Spasms., Disp: , Rfl:   •  fluticasone (FLONASE) 50 MCG/ACT nasal spray, 1 spray into each nostril Daily., Disp: , Rfl:   •  Folic Acid 5 MG capsule, Take  by mouth Every Other Day., Disp: , Rfl:   •  glucose blood test strip, 1 strip by Other route Daily., Disp: , Rfl:   •  HYDROcodone-acetaminophen (NORCO) 5-325 MG per tablet, Take 1 tablet by mouth Every 4 (Four) Hours As Needed for Moderate Pain ., Disp: , Rfl:   •  hydrocortisone (PROCTOZONE-HC) 2.5 % rectal cream, USE RECTALLY TID FOR 7 DAYS AS DIRECTED, Disp: , Rfl:   •  Multiple Vitamins-Minerals (MULTIVITAMIN PO), Take 1 tablet by mouth daily., Disp: , Rfl:   •  ONETOUCH DELICA LANCETS 33G misc, USE AS DIRECTED TO CHECK BLOOD SUGAR ONCE DAILY, Disp: , Rfl:   •  pioglitazone (ACTOS) 15 MG tablet, TAKE 1 TABLET BY MOUTH EVERY DAY, Disp: , Rfl:   •  polyethylene glycol (MIRALAX) pack packet, Take 17 g by mouth Daily., Disp: , Rfl:   •  potassium chloride (K-DUR) 10 MEQ CR tablet, Take  by mouth Daily., Disp: , Rfl: 0  •  sennosides-docusate sodium (SENOKOT-S) 8.6-50 MG tablet, Take 2 tablets by mouth Every Night., Disp: , Rfl:   •  vitamin B-12 (CYANOCOBALAMIN) 1000 MCG tablet, Take 1,000 mcg by mouth Daily., Disp: , Rfl:       Objective:     Vitals:    10/01/19 1532   BP: 160/88   Pulse: 101   Weight: 99.3 kg (219 lb)   Height: 165.1 cm (65\")     Body mass index is 36.44 kg/m².    PHYSICAL EXAM:    Physical Exam   Constitutional: She is oriented to person, place, and time. She appears well-developed and well-nourished. No distress.   HENT:   Head: Normocephalic and atraumatic.   Eyes: Pupils are equal, round, and reactive to light.   Neck: No JVD present. No thyromegaly present.   Cardiovascular: Normal rate, normal heart sounds and intact distal pulses. An irregularly irregular rhythm present.   No murmur heard.  Pulmonary/Chest: Effort normal and breath sounds normal. No respiratory distress.   Abdominal: Soft. Bowel sounds are normal. She " exhibits no distension. There is no splenomegaly or hepatomegaly. There is no tenderness.   Musculoskeletal: Normal range of motion. She exhibits no edema.   Neurological: She is alert and oriented to person, place, and time.   Skin: Skin is warm and dry. She is not diaphoretic. No erythema.   Psychiatric: She has a normal mood and affect. Her behavior is normal. Judgment normal.         ECG 12 Lead  Date/Time: 10/1/2019 3:46 PM  Performed by: Gely Valdes APRN  Authorized by: Gely Vlades APRN   Comparison: compared with previous ECG from 8/12/2019  Similar to previous ECG  Rhythm: atrial fibrillation  Ectopy: unifocal PVCs  Rate: normal  BPM: 101    Clinical impression: abnormal EKG  Comments: Indication: Atrial fibrillation              Assessment:       Diagnosis Plan   1. Essential hypertension     2. Other persistent atrial fibrillation  ECG 12 Lead     Orders Placed This Encounter   Procedures   • ECG 12 Lead     This order was created via procedure documentation          Plan:       1. Atrial Fibrillation and Atrial Flutter  Assessment  • The patient has persistent atrial fibrillation  • This is non-valvular in etiology  • The patient's CHADS2-VASc score is 3  • A GPH2OP8-YPVe score of 2 or more is considered a high risk for a thromboembolic event  • Apixaban prescribed    Plan  • Continue in atrial fibrillation with rate control  • Continue apixaban for antithrombotic therapy, bleeding issues discussed  • Continue beta blocker for rate control    Subjective - Objective  • She is in atrial fibrillation today.  Although her heart rate is elevated at 101, she reports that at home it is normally in the 70s and 80s.  Additionally, the Holter monitor revealed an average heart rate of 77.  Continue carvedilol at current dose.  She is tolerating the apixaban with no bleeding difficulties.      2.  Hypertension.  Although her blood pressure is elevated today, her blood pressure log from home reveals  that her blood pressure has consistently been in the 130s and 140s systolic and in the 60s diastolic.  She was also at her PCP recently at which time it was controlled in the 130s systolic.  At discharge, her amlodipine and olmesartan were both stopped.  I do think she is going to need more blood pressure support in the future, but for now she can continue the same regimen.      Overall I think she is doing well from a cardiac standpoint.  She denies any symptoms of angina or heart failure.  She remains in atrial fibrillation but is completely asymptomatic.  I am not going to make any changes today to her medical regimen and she will follow-up with Dr. Farah in 6 months or sooner if needed.      As always, it has been a pleasure to participate in your patient's care.      Sincerely,         CAROLA Echeverria

## 2019-10-15 RX ORDER — POTASSIUM CHLORIDE 750 MG/1
10 TABLET, FILM COATED, EXTENDED RELEASE ORAL DAILY
Qty: 90 TABLET | Refills: 2 | Status: SHIPPED | OUTPATIENT
Start: 2019-10-15 | End: 2020-07-07

## 2019-10-20 DIAGNOSIS — I10 ESSENTIAL HYPERTENSION: ICD-10-CM

## 2019-10-21 RX ORDER — CARVEDILOL 6.25 MG/1
TABLET ORAL
Qty: 180 TABLET | Refills: 2 | Status: SHIPPED | OUTPATIENT
Start: 2019-10-21 | End: 2020-05-18

## 2019-10-28 ENCOUNTER — APPOINTMENT (OUTPATIENT)
Dept: WOMENS IMAGING | Facility: HOSPITAL | Age: 69
End: 2019-10-28

## 2019-10-28 PROCEDURE — 77067 SCR MAMMO BI INCL CAD: CPT | Performed by: RADIOLOGY

## 2019-10-28 PROCEDURE — 77063 BREAST TOMOSYNTHESIS BI: CPT | Performed by: RADIOLOGY

## 2019-10-28 PROCEDURE — MDREVIEWSP: Performed by: RADIOLOGY

## 2019-11-13 ENCOUNTER — TRANSCRIBE ORDERS (OUTPATIENT)
Dept: ADMINISTRATIVE | Facility: HOSPITAL | Age: 69
End: 2019-11-13

## 2019-11-13 DIAGNOSIS — N28.1 COMPLEX RENAL CYST: Primary | ICD-10-CM

## 2019-11-13 NOTE — PROGRESS NOTES
"Subjective   Patient ID: Baltazar JACOBSEN is a 69 y.o. female is here today for follow-up. Ms. Jacobsen was last seen on 09/23/19 for post-op follow-up. She had a L3/4, L4/5 decompression on August 19, 2019 by Dr. Robertson. Patient is currently in outpatient PT. She is using a walker.     History of Present Illness     She returns the office today for follow-up status post 2 level decompression in August 2019 with Dr. Robertson.  She denies any ongoing leg pain.  She does have intermittent low back pain on the left with prolonged sitting.  She is graduated from [employer] and is doing outpatient physical and occupational therapy.  She is ambulating with a walker.  Her legs are getting stronger.  She is wearing a left AFO brace.  She is very pleased with her postoperative status.    She presents unaccompanied.    /80   Pulse 82   Ht 165.1 cm (65\")   Wt 99.3 kg (219 lb)   BMI 36.44 kg/m²       The following portions of the patient's history were reviewed and updated as appropriate: allergies, current medications, past family history, past medical history, past social history, past surgical history and problem list.    Review of Systems   Respiratory: Negative for chest tightness and shortness of breath.    Cardiovascular: Negative for chest pain.   Musculoskeletal: Positive for back pain.   All other systems reviewed and are negative.      Objective   Physical Exam   Constitutional: She is oriented to person, place, and time. She appears well-developed and well-nourished. She is cooperative.   Very pleasant well-appearing older female   HENT:   Head: Atraumatic.   Eyes: EOM are normal.   Corrective lenses   Neck: Neck supple. No tracheal deviation present.   Pulmonary/Chest: Effort normal.   Musculoskeletal: Normal range of motion. She exhibits no tenderness or deformity.        Lumbar back: She exhibits normal range of motion, no tenderness, no bony tenderness and no pain.   Decreased strength left lower extremity " diffusely  Normal strength right lower extremity  Deferred lumbar range of motion exam due to instability with standing   Neurological: She is alert and oriented to person, place, and time. She displays no tremor. No sensory deficit. Coordination and gait abnormal. GCS eye subscore is 4. GCS verbal subscore is 5.   Gait is slow, purposeful and wide-based, using a Rollator walker  Able to briefly stand on toes with assistance     Skin: Skin is warm and dry.   Psychiatric: She has a normal mood and affect. Her behavior is normal.   Vitals reviewed.    Neurologic Exam     Mental Status   Oriented to person, place, and time.     Cranial Nerves     CN III, IV, VI   Extraocular motions are normal.       Assessment/Plan   Independent Review of Radiographic Studies:    No new imaging      Medical Decision Making:    She returns to the office today for ongoing follow-up status post 2 level lumbar decompression for history of leg pain and weakness.  She is doing much better and slowly her weakness is improving.  She is ablating with a walker and is doing outpatient OT and physical therapy.  She has been followed closely by Dr. Tapia for rehab.  From our standpoint she is stable and doing well.  She has done extremely well from a surgical standpoint and has had almost complete resolution of low back and leg pain.  We will see her back on an as-needed basis.  She is to call at anytime with any questions or concerns.    Plan: Return to office as needed  Baltazar was seen today for follow-up.    Diagnoses and all orders for this visit:    Weakness of both lower extremities      Return if symptoms worsen or fail to improve.

## 2019-11-15 ENCOUNTER — TRANSCRIBE ORDERS (OUTPATIENT)
Dept: PHYSICAL THERAPY | Facility: HOSPITAL | Age: 69
End: 2019-11-15

## 2019-11-15 DIAGNOSIS — M48.061 SPINAL STENOSIS OF LUMBAR REGION, UNSPECIFIED WHETHER NEUROGENIC CLAUDICATION PRESENT: Primary | ICD-10-CM

## 2019-11-18 ENCOUNTER — HOSPITAL ENCOUNTER (OUTPATIENT)
Dept: PHYSICAL THERAPY | Facility: HOSPITAL | Age: 69
Setting detail: THERAPIES SERIES
Discharge: HOME | End: 2019-11-18

## 2019-11-18 DIAGNOSIS — M48.061 STENOSIS OF LATERAL RECESS OF LUMBAR SPINE: Primary | ICD-10-CM

## 2019-11-18 DIAGNOSIS — R26.89 FUNCTIONAL GAIT ABNORMALITY: ICD-10-CM

## 2019-11-18 DIAGNOSIS — M62.81 MUSCLE WEAKNESS (GENERALIZED): ICD-10-CM

## 2019-11-18 DIAGNOSIS — Z98.890 HISTORY OF LUMBAR LAMINECTOMY FOR SPINAL CORD DECOMPRESSION: ICD-10-CM

## 2019-11-18 PROCEDURE — 97162 PT EVAL MOD COMPLEX 30 MIN: CPT

## 2019-11-18 NOTE — THERAPY EVALUATION
.Outpatient Physical Therapy Neuro Initial Evaluation  [facility]     Patient Name: Baltazar MELO  : 1950  MRN: [id number]  Today's Date: 2019      Visit Date: 2019    Patient Active Problem List   Diagnosis   • Hypertension   • Morbid obesity with BMI of 40.0-44.9, adult (CMS/HCC)   • Lumbar back pain with radiculopathy affecting right lower extremity   • Diabetes mellitus (CMS/HCC)   • Renal lesion   • Paroxysmal atrial fibrillation (CMS/HCC)        Past Medical History:   Diagnosis Date   • A-fib (CMS/HCC)    • Arthritis    • Creatinine elevation    • Diabetes mellitus (CMS/HCC)    • Elevated cholesterol    • Health care maintenance    • Hyperlipidemia    • Hypertension    • Obesity    • PVC (premature ventricular contraction)         Past Surgical History:   Procedure Laterality Date   • BACK SURGERY     • BREAST CYST EXCISION Left    • BREAST SURGERY Bilateral     Reduction   • CHOLECYSTECTOMY     • HYSTERECTOMY     • LUMBAR DISCECTOMY Left 2019    Procedure: Left Lumbar Three to Lumbar Four and Lumbar Four to Lumbar Five Decompression;  Surgeon: Abhijit Robertson MD;  Location: [facility] OR;  Service: Neurosurgery         Visit Dx:     ICD-10-CM ICD-9-CM   1. Stenosis of lateral recess of lumbar spine M48.061 724.02   2. Muscle weakness (generalized) M62.81 728.87   3. Functional gait abnormality R26.89 781.2   4. History of lumbar laminectomy for spinal cord decompression Z98.890 V45.89       Patient History     Row Name 19 1100             History    Chief Complaint  Difficulty Walking;Muscle weakness;Other 1 (comment);Fatigue/poor endurance;Pain unable to perform steps   -LB      Type of Pain  Back pain  -LB      Date Current Problem(s) Began  19  -LB      Brief Description of Current Complaint  Pt developed weakness in her LE's and was unableto walk in early 2019. Pt was diagnosed with spinal stensosis and underwent left lumbar discectomy and  "decompression at L3-4, L4-5. Pt was transferred to a sub-acute rehab and then was discharged home and recieved HH therapies. Pt reporting she was recently diagnosed with A-fib and is being followed regarding a renal mass.   -LB      Previous treatment for THIS PROBLEM  Rehabilitation  -LB      Patient/Caregiver Goals  Other (comment) \"To walk up and down steps.\"   -LB      Hand Dominance  right-handed  -LB      Occupation/sports/leisure activities  crocket , reading   -LB      Patient seeing anyone else for problem(s)?  Pt's orders are from Geo Correa MD. OT is scheduled   -LB      Related/Recent Hospitalizations  Yes  -LB      Date of Hospitalization  08/12/19  -LB      Surgery/Hospitalization  -- 8/19/19  -LB         Pain     Pain Location  Foot;Leg thigh bilaterally  -LB      Pain at Present  3  -LB         Fall Risk Assessment    Any falls in the past year:  No  -LB      Does patient have a fear of falling  No  -LB         Services    Are you currently receiving Home Health services  No  -LB         Daily Activities    Primary Language  English  -LB      Are you able to read  Yes  -LB      Are you able to write  Yes  -LB      How does patient learn best?  Reading  -LB      Barriers to learning  None  -LB      Pt Participated in POC and Goals  Yes  -LB         Safety    Are you being hurt, hit, or frightened by anyone at home or in your life?  No  -LB      Are you being neglected by a caregiver  No  -LB        User Key  (r) = Recorded By, (t) = Taken By, (c) = Cosigned By    Initials Name Provider Type    Nalini Becker, PT Physical Therapist              PT Neuro     Row Name 11/18/19 1500 11/18/19 1100          Subjective Comments    Subjective Comments  --  \"Couldn't walk for about 2 weeks.\" \"I am walking a little at home with the walker with my  in the same room.\" \"I am wearing the AFO about 6 hours.\" \"I have always walked with my legs turned out.\"   -LB        Precautions and Contraindications "    Precautions  --  Latex Allergy   -LB        Subjective Pain    Able to rate subjective pain?  yes  -LB  yes  -LB     Pre-Treatment Pain Level  --  3  -LB     Post-Treatment Pain Level  --  3  -LB        Home Living    Living Arrangements  --  house  -LB     Home Accessibility  --  wheelchair accessible  -LB     Home Equipment  --  Rolling walker;Wheelchair-manual;Other (Comment) BSC, tub bench, L AFO, compression socks  -LB     Living Environment Comment  --  Pt lives with her .   -LB        Vision-Basic Assessment    Current Vision  --  No visual deficits  -LB        Cognition    Overall Cognitive Status  --  WFL  -LB     Memory  --  Appears intact  -LB     Orientation Level  --  Oriented X4  -LB     Safety Judgment  --  Good awareness of safety precautions  -LB     Deficits  --  Fully aware of deficits  -LB        Sensation    Additional Comments  --  hypersensitive to touch in the legs and feet  -LB        Proprioception    Proprioception  --  intact  -LB        Posture/Observations    Alignment Options  --  Forward head  -LB     Forward Head  --  Mild  -LB     Observations  --  Edema in bilateral LE's , wearing compression socks  -LB     Posture/Observations Comments  --  Pt arrived via w/c wearing her AFO.   -LB        General ROM    GENERAL ROM COMMENTS  --  AROM WNL bilateral UE and LE's   -LB        MMT (Manual Muscle Testing)    Rt Lower Ext  --  Rt Hip Flexion;Rt Hip Extension;Rt Hip ABduction;Rt Knee Extension;Rt Knee Flexion;Rt Ankle Dorsiflexion;Rt Ankle Subtalar Inversion;Rt Ankle Subtalar Eversion;Rt Ankle Plantarflexion;Rt MTP Flexion;Rt MTP Extension  -LB     Lt Lower Ext  --  Lt Hip Flexion;Lt Hip Extension;Lt Hip ABduction;Lt Knee Extension;Lt Knee Flexion;Lt Ankle Plantarflexion;Lt Ankle Dorsiflexion;Lt Ankle Subtalar Inversion;Lt Ankle Subtalar Eversion;Lt MTP Flexion;Lt MTP Extension  -LB        MMT Right Lower Ext    Rt Hip Flexion MMT, Gross Movement  --  (4/5) good  -LB     Rt Hip  Extension MMT, Gross Movement  --  -- moderate resistance in sidelying  -LB     Rt Hip ABduction MMT, Gross Movement  --  (2+/5) poor plus  -LB     Rt Knee Extension MMT, Gross Movement  --  (5/5) normal  -LB     Rt Knee Flexion MMT, Gross Movement  --  -- minimal resistance in sidelying  -LB     Rt Ankle Dorsiflexion MMT, Gross Movement  --  (4/5) good  -LB     Rt Ankle Subtalar Inversion MT, Gross Movement  --  (4/5) good  -LB     Rt Ankle Subtalar Eversion MMT, Gross Movement  --  (3+/5) fair plus  -LB     Rt MTP Flexion MMT, Gross Movement  --  (1/5) trace  -LB     Rt MTP Extension MMT, Gross Movement  --  (1/5) trace  -LB        MMT Left Lower Ext    Lt Hip Flexion MMT, Gross Movement  --  (4/5) good  -LB     Lt Hip Extension MMT, Gross Movement  --  -- with moderate resistance in sidelying  -LB     Lt Hip ABduction MMT, Gross Movement  --  (2+/5) poor plus  -LB     Lt Knee Extension MMT, Gross Movement  --  (5/5) normal  -LB     Lt Knee Flexion MMT, Gross Movement  --  -- minimal resistance in sidelying   -LB     Lt Ankle Dorsiflexion MMT, Gross Movement  --  (4/5) good  -LB     Lt Ankle Subtalar Inversion MMT, Gross Movement  --  (4/5) good  -LB     Lt Ankle Subtalar Eversion MMT, Gross Movement  --  (2/5) poor  -LB     Lt MTP Flexion MMT, Gross Movement  --  (1/5) trace  -LB     Lt MTP Extension MMT, Gross Movement  --  (1/5) trace  -LB        Bed Mobility Assessment/Treatment    Bed Mobility Assessment/Treatment  --  rolling left;rolling right;supine-sit;sit-supine  -LB     Rolling Left [place] (Bed Mobility)  --  verbal cues;supervision to flex opposite knee up   -LB     Rolling Right [place] (Bed Mobility)  --  verbal cues;supervision to flex opposite knee up  -LB     Supine-Sit [place] (Bed Mobility)  --  supervision;verbal cues  -LB     Sit-Supine [place] (Bed Mobility)  --  supervision;verbal cues  -LB     Comment (Bed Mobility)  --  Pt does not roll to the side first with  transfers.  -LB        Transfers    Sit-Stand [place] (Transfers)  --  contact guard;verbal cues pt placing one UE on walker, one on chair seat  -LB     Stand-Sit [place] (Transfers)  --  contact guard;verbal cues  -LB     Transfers, Sit-Stand-Sit, Assist Device  --  rolling walker  -LB     Transfer, Safety Issues  --  balance decreased during turns;sequencing ability decreased;weight-shifting ability decreased  -LB     Comment (Transfers)  --  definite need of UE's with transfers  -LB        Gait/Stairs Assessment/Training    [place] Level (Gait)  --  contact guard  -LB     Assistive Device (Gait)  --  walker, front-wheeled with and without AFO   -LB     Distance in Feet (Gait)  --  40' with AFO, 20' without AFO   -LB     Pattern (Gait)  --  step-to;step-through  -LB     Deviations/Abnormal Patterns (Gait)  --  gait speed decreased;stride length decreased  -LB     Bilateral Gait Deviations  --  forward flexed posture strong ER R > L, no pushoff  -LB     [place] Level (Stairs)  --  contact guard;minimum assist (75% patient effort);2 person assist  -LB     Assistive Device (Stairs)  --  walker, front-wheeled  -LB     Number of Steps (Stairs)  --  curb only assesses due to weakness  -LB     Ascending Technique (Stairs)  --  other (see comments) leads with R LE  -LB     Descending Technique (Stairs)  --  other (see comments) leads with R LE  -LB     Stairs, Safety Issues  --  sequencing ability decreased;weight-shifting ability decreased  -LB     Stairs, Impairments  --  strength decreased;impaired balance  -LB     Comment (Gait/Stairs)  --  STEPS not evaluated at this time.   (Significant)   -LB        Balance Skills Training    Balance Comments  --  see Tinetti   -LB        Orthotic/Prosthetic Management    Orthosis Location  --  AFO (ankle foot orthosis)  -LB        Ankle Foot Orthosis Management    Type (Ankle/Foot Orthosis)  --  -- posterior lateral strut AFO ([place])  -LB     Wearing Schedule  (Ankle Foot Orthosis)  --  wear with activity/work pt reports wearing AFO ~ 6 hours   -LB     Orthosis Training (Ankle Foot Orthosis)  --  patient and caregiver  -LB       User Key  (r) = Recorded By, (t) = Taken By, (c) = Cosigned By    Initials Name Provider Type    Nalini Becker, PT Physical Therapist                  Therapy Education  Education Details: Instructed in rolling technique with knees flexed.  Given: Mobility training  How Provided: Verbal  Provided to: Patient, Caregiver  Level of Understanding: Teach back education performed, Verbalized, Demonstrated    PT OP Goals     Row Name 11/18/19 1100          PT Short Term Goals    STG Date to Achieve  12/17/19  -LB     STG 1  Pt to come to stand from armless chair to rolling walker with both hands on the chair seat.   -LB     STG 2  Pt to increase strength in her bilateral hip abductors to 3+/5.  -LB     STG 3  Pt to increase strength ankle musculature by 1/2 grade overall.   -LB     STG 4  Pt to ascend and descend a curb with rolling walker with CGA to minimal of 1.   -LB     STG 5  Pt to ambulate with ~ 80' x 3 in one session with rolling walker.   -LB     STG 6  Pt to improve score to 18/28 on the Tinetti to improve balance and reduce risk of falls.   -LB     STG 7  Pt to be evaluated on stairs.   -LB        Long Term Goals    LTG Date to Achieve  02/10/20  -LB     LTG 1  Pt to increase strength in her bilateral hip abductors to 4/5.  -LB     LTG 2  Pt to increase strength ankle musculature by 1 grade overall  -LB     LTG 3  Pt to improve score to 21/28 on the Tinetti to improve balance and reduce risk of falls.   -LB     LTG 4  Pt to ambulate ~ 200' x 3 conditional independent with least restrictive device  -LB     LTG 5  Pt to perform knee flexion in sitting with maximal resistance.   -LB     LTG 6  Pt to ambulate with increased wt shift forward and increased pushoff   -LB     LTG 7  Pt to ascend and descend a curb conditional independent with  least restrictive device.   -LB     LTG 8  Pt to ambulate with increased step length.  -LB        Time Calculation    PT Goal Re-Cert Due Date  12/17/19  -LB       User Key  (r) = Recorded By, (t) = Taken By, (c) = Cosigned By    Initials Name Provider Type    Nalini Becker, PT Physical Therapist          PT Assessment/Plan     Row Name 11/18/19 1635          PT Assessment    Functional Limitations  Decreased safety during functional activities;Impaired gait;Performance in self-care ADL;Limitations in community activities  -LB     Impairments  Balance;Endurance;Gait;Muscle strength;Sensation;Pain  -LB     Assessment Comments  Pt reporting she was unable to walk for ~ 2 weeks prior to her left L3-L4 and L4-L5 decompression and discectomy on 8/19/19. Pt was transferred to sub-acute rehab and then received  therapies. Pt returns to therapies today via manual wheelchair. Pt demonstrating signficant weakness in bilateral LE's and has sensory changes in her LE's. Pt demonstrating deficits with transfers, gait and balance as indicated by the Tinetti. Pt demonstrating limited gait and multiple gait deviaitons. Pt had difficulty ascending and descending a curb with her walker. Pt would greatly benenfit from skilled therapy.   -LB     Please refer to paper survey for additional self-reported information  Yes  -LB     Rehab Potential  Good  -LB     Patient/caregiver participated in establishment of treatment plan and goals  Yes  -LB     Patient would benefit from skilled therapy intervention  Yes  -LB        PT Plan    PT Frequency  2x/week  -LB     Predicted Duration of Therapy Intervention (Therapy Eval)  12 weeks   -LB     Planned CPT's?  PT EVAL MOD COMPLELITY: 28132;PT NEUROMUSC RE-EDUCATION EA 15 MIN: 55835  -LB       User Key  (r) = Recorded By, (t) = Taken By, (c) = Cosigned By    Initials Name Provider Type    Nalini Becker, PT Physical Therapist             OP Exercises     Row Name 11/18/19 1500 11/18/19  "1100          Subjective Comments    Subjective Comments  --  \"Couldn't walk for about 2 weeks.\" \"I am walking a little at home with the walker with my  in the same room.\" \"I am wearing the AFO about 6 hours.\" \"I have always walked with my legs turned out.\"   -LB        Subjective Pain    Able to rate subjective pain?  yes  -LB  yes  -LB     Pre-Treatment Pain Level  --  3  -LB     Post-Treatment Pain Level  --  3  -LB       User Key  (r) = Recorded By, (t) = Taken By, (c) = Cosigned By    Initials Name Provider Type    Nalini Becker, PT Physical Therapist                      Outcome Measure Options: Tinetti  Tinetti Assessment  Tinetti Assessment: yes  Sitting Balance: Steady,safe  Arises: Able, uses arms  Attempts to Rise: Able, requires > 1 attempt  Immediate Standing Balance (first 5 sec): Steady, with support  Standing Balance: Steady, stance > 4 inch MARLEE & requires support  Sternal Nudge (feet close together): Steady  Eyes Closed (feet close together): Steady  Turning 360 Degrees- Steps: Discontinuous steps(unsafe to try )  Turning 360 Degrees- Steadiness: Unsteady (staggers, grabs)(unsafe to try )  Sitting Down: Uses arms or not a smooth motion  Tinetti Balance Score: 9  Gait Initiation (immediate after told \"go\"): Any hesitancy, multiple attempts to start  Step Length- Right Swing: Right swing foot passes Left stance leg  Step Length- Left Swing: Left swing foot passes Right  Foot Clearance- Right Foot: Right foot completely clears floor  Foot Clearance- Left Foot: Left foot completely clears floor  Step Symmetry: Right and Left step length unequal  Step Continuity: Stop/discontinuity between steps  Path (excursion): Mild/moderate deviation or use of aid  Trunk: Marked sway or uses device  Base of Support: Heels close while walking  Gait Score: 6  Tinetti Total Score: 15  Tinetti Assistive Device: rolling walker(L AFO in place )  Tinetti Assessment Comments: with CGA from PT     Time Calculation: "   Start Time: 1100  Stop Time: 1146  Time Calculation (min): 46 min  Total Timed Code Minutes- PT: 46 minute(s)   Therapy Charges for Today     Code Description Service Date Service Provider Modifiers Qty    01000586256 HC PT EVAL MOD COMPLEXITY 4 11/18/2019 Nalini Mccall, PT GP 1          PT G-Codes  Outcome Measure Options: Tinetti  Tinetti Total Score: 15         Nalini Mccall, PT  11/18/2019

## 2019-11-22 ENCOUNTER — HOSPITAL ENCOUNTER (OUTPATIENT)
Dept: PHYSICAL THERAPY | Facility: HOSPITAL | Age: 69
Setting detail: THERAPIES SERIES
Discharge: HOME | End: 2019-11-22

## 2019-11-22 ENCOUNTER — HOSPITAL ENCOUNTER (OUTPATIENT)
Dept: OCCUPATIONAL THERAPY | Facility: HOSPITAL | Age: 69
Setting detail: THERAPIES SERIES
Discharge: HOME | End: 2019-11-22

## 2019-11-22 DIAGNOSIS — M48.061 SPINAL STENOSIS OF LUMBAR REGION, UNSPECIFIED WHETHER NEUROGENIC CLAUDICATION PRESENT: Primary | ICD-10-CM

## 2019-11-22 DIAGNOSIS — M48.061 STENOSIS OF LATERAL RECESS OF LUMBAR SPINE: Primary | ICD-10-CM

## 2019-11-22 DIAGNOSIS — M62.81 MUSCLE WEAKNESS (GENERALIZED): ICD-10-CM

## 2019-11-22 DIAGNOSIS — R53.1 GENERALIZED WEAKNESS: ICD-10-CM

## 2019-11-22 PROCEDURE — 97166 OT EVAL MOD COMPLEX 45 MIN: CPT | Performed by: OCCUPATIONAL THERAPIST

## 2019-11-22 PROCEDURE — 97112 NEUROMUSCULAR REEDUCATION: CPT

## 2019-11-22 NOTE — THERAPY TREATMENT NOTE
"    Outpatient Physical Therapy Neuro Treatment Note  Psychiatric     Patient Name: Baltazar MELO  : 1950  MRN: [id number]  Today's Date: 2019      Visit Date: 2019    Visit Dx:    ICD-10-CM ICD-9-CM   1. Stenosis of lateral recess of lumbar spine M48.061 724.02   2. Muscle weakness (generalized) M62.81 728.87       Patient Active Problem List   Diagnosis   • Hypertension   • Morbid obesity with BMI of 40.0-44.9, adult (CMS/HCC)   • Lumbar back pain with radiculopathy affecting right lower extremity   • Diabetes mellitus (CMS/HCC)   • Renal lesion   • Paroxysmal atrial fibrillation (CMS/HCC)          Hand Therapy (last 24 hours)      Hand Eval     Row Name 19 1200             Hand  Strength     Strength Affected Side  Bilateral  -SG          Strength Right    # Reps  3  -SG      Right Rung  2  -SG      Right  Test 1  40  -SG      Right  Test 2  45  -SG      Right  Test 3  45  -SG       Strength Average Right  43.33  -SG          Strength Left    # Reps  3  -SG      Left Rung  2  -SG      Left  Test 1  30  -SG      Left  Test 2  25  -SG      Left  Test 3  20  -SG       Strength Average Left  25  -SG         Pinch Strength    Affected Side  Bilateral  -SG         Right Hand Strength - Pinch (lbs)    Lateral  13 lbs  -SG      Tip (2 point)  10 lbs  -SG         Left Hand Strength - Pinch (lbs)    Lateral  11 lbs  -SG      Tip (2 point)  6 lbs  -SG        User Key  (r) = Recorded By, (t) = Taken By, (c) = Cosigned By    Initials Name Provider Type    Kalee Bruce OTR Occupational Therapist          PT Neuro     Row Name 19 0935             Subjective Comments    Subjective Comments  \"I saw Dr. Tapia and he said I was walking better with my AFO on.\" \"I am wearing it more without any problems.\" \"This feels goo.\" (referring to stretching of her gastrocs on incline of ll bars)   -LB         Precautions and Contraindications    " Precautions/Limitations  fall precautions  -LB      Precautions  Latex Allergy , spinal surgery 8/19/19 GREAT difficulty with the CURB on IE   -LB         Subjective Pain    Able to rate subjective pain?  yes  -LB      Pre-Treatment Pain Level  3  -LB      Post-Treatment Pain Level  3  -LB         Posture/Observations    Alignment Options  Forward head  -LB      Forward Head  Mild  -LB      Observations  Edema minimal in bilateral ankles/legs   -LB      Posture/Observations Comments  Pt reporting the swelling in her legs and feet were much bettter.   -LB         Bed Mobility Assessment/Treatment    Comment (Bed Mobility)  not performed   -LB         Transfers    Sit-Stand [place] (Transfers)  contact guard;minimum assist (75% patient effort)  -LB      Stand-Sit [place] (Transfers)  contact guard;minimum assist (75% patient effort)  -LB      Transfers, Sit-Stand-Sit, Assist Device  rolling walker  -LB      Transfer, Safety Issues  balance decreased during turns;sequencing ability decreased;weight-shifting ability decreased decreased wt shift over LE's w/ legs pushing against chair  -LB      Comment (Transfers)  Worked on coming to stand from an armless chair with both UE's on the chair and once standing placing hands on the walker.   -LB         Gait/Stairs Assessment/Training    [place] Level (Gait)  contact guard;minimum assist (75% patient effort) min tactile cues to R gluts at stance   -LB      Assistive Device (Gait)  walker, 4-wheeled;AFO LEFT AFO   -LB      Distance in Feet (Gait)  60' x 2   -LB      Pattern (Gait)  step-to;step-through  -LB      Deviations/Abnormal Patterns (Gait)  gait speed decreased;stride length decreased  -LB      Bilateral Gait Deviations  forward flexed posture  strong ER R > L, no pushoff  -LB      Left Sided Gait Deviations  heel strike decreased  -LB      Right Sided Gait Deviations  weight shift ability decreased;heel strike decreased excessive lateral wt shift   -LB  "     [place] Level (Stairs)  not tested working on strengthing w/ stepups   -LB      Comment (Gait/Stairs)  tactile cues to R gluts at stance to prevent excessive lateral wt shift.   -LB         Balance Skills Training    Standing-Level of Assistance  Contact guard;Minimum assistance  -LB      Static Standing Balance Support  Right upper extremity supported;Left upper extremity supported;parallel bars  -LB      Standing-Balance Activities  Standing on 1/2 roll;Compliant surfaces;Semi-tandum wearing her shoes w/o orthotics or AFO semi-tandem- 4\" foam  -LB      Balance Comments  gastroc stretch on incline at end of ll bars with B UE support wearing her shoes w/o orthotic or AFO performed before balance activities  (Significant)   -LB        User Key  (r) = Recorded By, (t) = Taken By, (c) = Cosigned By    Initials Name Provider Type    Nalini Becker, PT Physical Therapist                  PT Assessment/Plan     Row Name 11/22/19 1248 11/22/19 1227       PT Assessment    Assessment Comments  Pt reporting no problmes with increased wearing time of her AFO. Pt was eager to work on mobility, LE stretching and strengthening.   -LB  --       PT Plan    Predicted Duration of Therapy Intervention (Therapy Eval)  --  2x/week for 4-6 weeks  -SG      User Key  (r) = Recorded By, (t) = Taken By, (c) = Cosigned By    Initials Name Provider Type    Nalini Becker, PT Physical Therapist    SG Kalee Smith, OTR Occupational Therapist             OP Exercises     Row Name 11/22/19 0939             Subjective Comments    Subjective Comments  \"I saw Dr. Tapia and he said I was walking better with my AFO on.\" \"I am wearing it more without any problems.\" \"This feels goo.\" (referring to stretching of her gastrocs on incline of ll bars)   -LB         Subjective Pain    Able to rate subjective pain?  yes  -LB      Pre-Treatment Pain Level  3  -LB      Post-Treatment Pain Level  3  -LB         Exercise 1    Exercise Name 1  " "gastroc stretch on incline at end of ll bars with B UE support wearing her shoes w/o orthotic or AFO performed before balance activities  -LB      Cueing 1  Verbal;Tactile  -LB      Reps 1  2  -LB      Time 1  60 seconds   -LB         Exercise 2    Exercise Name 2  Step-ups onto 4\" step within the ll bars with strong CGA   -LB      Cueing 2  Verbal;Tactile  -LB      Reps 2  10 each LE   -LB      Additional Comments  definite need to use UE's   -LB         Exercise 3    Exercise Name 3  AROM bilateral ankles in all planes   -LB      Cueing 3  Verbal;Tactile  -LB      Reps 3  5   -LB        User Key  (r) = Recorded By, (t) = Taken By, (c) = Cosigned By    Initials Name Provider Type    Nalini Becker, PT Physical Therapist                          Therapy Education  Education Details: Transfers from an armless chair with both UE's pushing on the chair seat.   Given: Mobility training, HEP(Gastroc stretch while standing with a walker against a wall, w/c behing pt and a towel roll under her forefoot. )  Program: New  How Provided: Verbal, Demonstration  Provided to: Patient, Caregiver  Level of Understanding: Verbalized, Teach back education performed, Demonstrated              Time Calculation:   Start Time: 0935  Stop Time: 1021  Time Calculation (min): 46 min  Total Timed Code Minutes- PT: 46 minute(s)   Therapy Charges for Today     Code Description Service Date Service Provider Modifiers Qty    [id number]  PT NEUROMUSC RE EDUCATION EA 15 MIN 11/22/2019 Nalini Mccall, PT GP 3                    Nalini Mccall, PT  11/22/2019     "

## 2019-11-22 NOTE — THERAPY EVALUATION
Outpatient Occupational Therapy Neuro Initial Evaluation  [facility]     Patient Name: Baltazar MELO  : 1950  MRN: [id number]  Today's Date: 2019      Visit Date: 2019    Patient Active Problem List   Diagnosis   • Hypertension   • Morbid obesity with BMI of 40.0-44.9, adult (CMS/HCC)   • Lumbar back pain with radiculopathy affecting right lower extremity   • Diabetes mellitus (CMS/HCC)   • Renal lesion   • Paroxysmal atrial fibrillation (CMS/HCC)        Past Medical History:   Diagnosis Date   • A-fib (CMS/HCC)    • Arthritis    • Creatinine elevation    • Diabetes mellitus (CMS/HCC)    • Elevated cholesterol    • Health care maintenance    • Hyperlipidemia    • Hypertension    • Obesity    • PVC (premature ventricular contraction)         Past Surgical History:   Procedure Laterality Date   • BACK SURGERY     • BREAST CYST EXCISION Left    • BREAST SURGERY Bilateral     Reduction   • CHOLECYSTECTOMY     • HYSTERECTOMY     • LUMBAR DISCECTOMY Left 2019    Procedure: Left Lumbar Three to Lumbar Four and Lumbar Four to Lumbar Five Decompression;  Surgeon: Abhijit Robertson MD;  Location: [facility];  Service: Neurosurgery         Visit Dx:      ICD-10-CM ICD-9-CM   1. Spinal stenosis of lumbar region, unspecified whether neurogenic claudication present M48.061 724.02   2. Generalized weakness R53.1 780.79       Patient History     Row Name 19 1200             History    Chief Complaint  Difficulty Walking;Muscle weakness;Fatigue/poor endurance;Pain;Difficulty with daily activities  -SG      Type of Pain  Back pain  -SG      Date Current Problem(s) Began  19  -SG      Brief Description of Current Complaint  Pt developed weakness in her LE's and was unableto walk in early 2019. Pt was diagnosed with spinal stensosis and underwent left lumbar discectomy and decompression at L3-4, L4-5. Pt was transferred to a sub-acute rehab and then was discharged home and recieved  "HH therapies. Pt reporting she was recently diagnosed with A-fib and is being followed regarding a renal mass.   -SG      Previous treatment for THIS PROBLEM  Rehabilitation  -SG      Patient/Caregiver Goals  Improve mobility;Improve strength  -SG      Hand Dominance  right-handed  -SG      Occupation/sports/leisure activities  [employer]  -SG      Patient seeing anyone else for problem(s)?  PT and OT  -SG         Pain     Pain at Present  0  -SG         Fall Risk Assessment    Any falls in the past year:  No  -SG      Does patient have a fear of falling  No  -SG         Services    Are you currently receiving Home Health services  No  -SG         Daily Activities    Primary Language  English  -SG      Are you able to read  Yes  -SG      Are you able to write  Yes  -SG      How does patient learn best?  Reading  -SG      Barriers to learning  None  -SG      Pt Participated in POC and Goals  Yes  -SG         Safety    Are you being hurt, hit, or frightened by anyone at home or in your life?  No  -SG      Are you being neglected by a caregiver  No  -SG        User Key  (r) = Recorded By, (t) = Taken By, (c) = Cosigned By    Initials Name Provider Type    Kalee Bruce OTR Occupational Therapist          OT Neuro     Row Name 11/22/19 1200             Subjective Comments    Subjective Comments  \"I would like to work on strengthing exercises.\"  -SG         Precautions and Contraindications    Precautions/Limitations  fall precautions  -SG         Home Living    Living Environment Comment  Pt lives with spouse; has walk in shower, currently using tub bench, puts BSC next to bed for night time use, has reacher/sockaid/shoehorn  -SG         Cognitive Assessment/Intervention    Current Cognitive/Communication Assessment  functional  -SG      Orientation Status (Cognition)  oriented x 3  -SG      Follows Commands (Cognition)  WNL  -SG         Sensation    Sensation WNL?  WNL  -SG         Coordination    Coordination " Tests  Box and Blocks;9-Hole Peg  -      Box and Blocks Left  69  -SG      Box and Blocks Right  77  -SG      9-Hole Peg Left  22  -SG      9-Hole Peg Right  22  -SG         General ROM    GENERAL ROM COMMENTS  UE WFL  -SG         MMT (Manual Muscle Testing)    General MMT Comments  Some generalized UE weakness 4-/5  -SG         Transfers    Transfers, Sit-Stand [place]  supervision required  -SG      Transfers, Stand-Sit [place]  supervision required  -SG      Transfers, Sit-Stand-Sit, Assist Device  rolling walker  -SG         ADL Assessment/Intervention    ADL's Assessed?  Upper Body Bathing;Lower Body Bathing;Upper Body Dressing;Lower Body Dressing;Toileting;Grooming  -         Bathing Assessment/Intervention    Bathing [place] Level  bathing skills;supervision  -SG         Upper Body Dressing Assessment/Training    Upper Body Dressing [place] Level  upper body dressing skills;supervision  -SG      Comment (Upper Body Dressing)  Retrieves clothing from closet  -         Lower Body Dressing Assessment/Training    Lower Body Dressing [place] Level  pants/bottoms;undergarment;supervision;shoes/slippers;socks;minimum assist (75% patient effort)  -         Toileting Assessment/Training    [place] Level (Toileting)  toileting skills;supervision  -SG         Grooming Assessment/Training    [place] Level (Grooming)  grooming skills;supervision  -SG      Grooming Position  sink side;unsupported standing  -        User Key  (r) = Recorded By, (t) = Taken By, (c) = Cosigned By    Initials Name Provider Type    Kalee Bruce OTR Occupational Therapist             Hand Therapy (last 24 hours)      Hand Eval     Row Name 11/22/19 1200             Hand  Strength     Strength Affected Side  Bilateral  -SG          Strength Right    # Reps  3  -SG      Right Rung  2  -SG      Right  Test 1  40  -SG      Right  Test 2  45  -SG      Right  Test 3  45  -SG        Strength Average Right  43.33  -SG          Strength Left    # Reps  3  -SG      Left Rung  2  -SG      Left  Test 1  30  -SG      Left  Test 2  25  -SG      Left  Test 3  20  -SG       Strength Average Left  25  -SG         Pinch Strength    Affected Side  Bilateral  -SG         Right Hand Strength - Pinch (lbs)    Lateral  13 lbs  -SG      Tip (2 point)  10 lbs  -SG         Left Hand Strength - Pinch (lbs)    Lateral  11 lbs  -SG      Tip (2 point)  6 lbs  -SG        User Key  (r) = Recorded By, (t) = Taken By, (c) = Cosigned By    Initials Name Provider Type    Kalee Bruce OTMARY Occupational Therapist              Therapy Education  Education Details: Discussed OT role and goals.  How Provided: Verbal  Provided to: Patient, Caregiver  Level of Understanding: Verbalized    OT Goals     Row Name 11/22/19 1200          OT Short Term Goals    STG Date to Achieve  12/20/19  -     STG 1  Pt. to be (I) with strengthening HEP.  -     STG 2  Pt and spouse to be educated on functional shower transfer to shower chair vs tub bench.  -        Long Term Goals    LTG Date to Achieve  01/03/20  -     LTG 1  Patient will reach to right and left sides in standing with moderate to maximum challenge without loss of balance.  -SG     LTG 2  Patient will be able to  midline for 10 minutes with one hand for support for increasing standing balance for self care.  -SG     LTG 3  Patient will be able to complete laundry task with supervision.  -     LTG 4  Pt to increase BUE strength to 4/5 to assist with functional tasks.  -     LTG 5  Pt to transfer to shower chair with mod I.  -        Time Calculation    OT Goal Re-Cert Due Date  12/20/19  -       User Key  (r) = Recorded By, (t) = Taken By, (c) = Cosigned By    Initials Name Provider Type    Kalee Bruce OTR Occupational Therapist                 OT Assessment/Plan     Row Name 11/22/19 1227          OT Assessment     Functional Limitations  Limitations in functional capacity and performance;Performance in leisure activities;Performance in self-care ADL;Limitation in home management;Limitations in community activities  -SG     Impairments  Balance;Endurance;Muscle strength  -SG     Assessment Comments  Pt seen this date for OT eval following spinal stenosis and sx. Pt was indep prior and suddenly developed LE weakness and was unable to walk. She recieved sub acute rehab and HH OT. Pt is now currently supervision with ADLs except LBD she recieves min A. Pt transfers to commode and shower with supervision using a rwx. Pt has begun starting to do some simple meal prep but has not used the stove or oven due to it being on a different floor. She is also able to fold laundry but is not currently using the washer/dryer. Pt does have some overall decreased strength and endurance and would benefit from OP OT to address these deficits to improve independence with ADLs and IADLs.    -SG     Please refer to paper survey for additional self-reported information  Yes  -SG     OT Diagnosis  L3-4, L4-5 lumbar disectomy and decompression  -SG     OT Rehab Potential  Good  -SG     Patient/caregiver participated in establishment of treatment plan and goals  Yes  -SG     Patient would benefit from skilled therapy intervention  Yes  -SG        OT Plan    OT Frequency  2x/week  -SG     Predicted Duration of Therapy Intervention (Therapy Eval)  2x/week for 4-6 weeks  -SG     Planned CPT's?  OT EVAL MOD COMPLEXITY: 04550;OT NEUROMUSC RE EDUCATION EA 15 MIN: 05032;OT SELF CARE/MGMT/TRAIN 15 MIN: 71287;OT THER PROC EA 15 MIN: [id number]  -SG     Planned Therapy Interventions (Optional Details)  home exercise program;strengthening;patient/family education;neuromuscular re-education  -SG       User Key  (r) = Recorded By, (t) = Taken By, (c) = Cosigned By    Initials Name Provider Type    Kalee Bruce OTR Occupational Therapist              Outcome  Measure Options: Disabilities of the Arm, Shoulder, and Hand (DASH)  9 Hole Peg  9-Hole Peg Left: 22  9-Hole Peg Right: 22  Box and Blocks  Box and Blocks Left: 69  Box and Blocks Right: 77  DASH  Open a tight or new jar.: Mild Difficulty  Write: No Difficulty  Turn a key: No Difficulty  Prepare a meal: No Difficulty  Push open a heavy door: Moderate Difficulty  Place an object on a shelf above your head: Moderate Difficulty  Do heavy household chores (e.g., wash walls, wash floors): Moderate Difficulty  Garden or do yard work: Moderate Difficulty  Make a bed: Mild Difficulty  Carry a shopping bag or briefcase: No Difficulty  Carry a heavy object (over 10 lbs): Unable  Change a lightbulb overhead: Moderate Difficulty  Wash or blow dry your hair: No Difficulty  Wash your back: No Difficulty  Put on a pullover sweater: No Difficulty  Use a knife to cut food: No Difficulty  Recreational activities in which require little effort (e.g., cardplaying, knitting, etc.): No Difficulty  Recreational activities in which you take some force or impact through your arm, should or hand (e.g. golf, hammering, tennis, etc.): Moderate Difficulty  Recreational Activities in which you move your arm freely (e.g., frisbee, badminton, etc.): Moderate Difficulty  Manage transportation needs (getting from one place to another): No Difficulty  During the past week, to what extent has your arm, shoulder, or hand problem interfered with your normal social activites with family, friends, neighbors or groups?: Not at all  During the past week, were you limited in your work or other regular daily activities as a result of your arm, shoulder or hand problem?: Not limited at all  Arm, Shoulder, or hand pain: None  Arm, shoulder or hand pain when you performed any specific activity: None  Tingling (pins and needles) in your arm, shoulder, or hand: None  Weakness in your arm, shoulder or hand: None  Stiffness in your arm, shoulder or hand:  None  During the past week, how much difficulty have you had sleeping because of the pain in your arm, shoulder or hand?: No difficulty  I feel less capable, less confident or less useful because of my arm, shoulder or hand problem: Strongly disagree  DASH Sum : 49  Number of Questions Answered: 29  DASH Score: 17.24         Time Calculation:   OT Start Time: 0845  OT Stop Time: 0930  OT Time Calculation (min): 45 min     Therapy Charges for Today     Code Description Service Date Service Provider Modifiers Qty    57320562832  OT EVAL MOD COMPLEXITY 3 11/22/2019 Kalee Smith, OTR GO 1                     BILLY Peoples  11/22/2019

## 2019-11-25 ENCOUNTER — OFFICE VISIT (OUTPATIENT)
Dept: NEUROSURGERY | Facility: CLINIC | Age: 69
End: 2019-11-25

## 2019-11-25 ENCOUNTER — HOSPITAL ENCOUNTER (OUTPATIENT)
Dept: OCCUPATIONAL THERAPY | Facility: HOSPITAL | Age: 69
Setting detail: THERAPIES SERIES
Discharge: HOME | End: 2019-11-25

## 2019-11-25 ENCOUNTER — HOSPITAL ENCOUNTER (OUTPATIENT)
Dept: PHYSICAL THERAPY | Facility: HOSPITAL | Age: 69
Setting detail: THERAPIES SERIES
Discharge: HOME | End: 2019-11-25

## 2019-11-25 VITALS
SYSTOLIC BLOOD PRESSURE: 157 MMHG | HEART RATE: 82 BPM | WEIGHT: 219 LBS | DIASTOLIC BLOOD PRESSURE: 80 MMHG | HEIGHT: 65 IN | BODY MASS INDEX: 36.49 KG/M2

## 2019-11-25 DIAGNOSIS — M62.81 MUSCLE WEAKNESS (GENERALIZED): ICD-10-CM

## 2019-11-25 DIAGNOSIS — Z98.890 HISTORY OF LUMBAR LAMINECTOMY FOR SPINAL CORD DECOMPRESSION: ICD-10-CM

## 2019-11-25 DIAGNOSIS — R29.898 WEAKNESS OF BOTH LOWER EXTREMITIES: Primary | ICD-10-CM

## 2019-11-25 DIAGNOSIS — R53.1 GENERALIZED WEAKNESS: ICD-10-CM

## 2019-11-25 DIAGNOSIS — M48.061 STENOSIS OF LATERAL RECESS OF LUMBAR SPINE: Primary | ICD-10-CM

## 2019-11-25 DIAGNOSIS — R26.89 FUNCTIONAL GAIT ABNORMALITY: ICD-10-CM

## 2019-11-25 DIAGNOSIS — M48.061 SPINAL STENOSIS OF LUMBAR REGION, UNSPECIFIED WHETHER NEUROGENIC CLAUDICATION PRESENT: Primary | ICD-10-CM

## 2019-11-25 PROCEDURE — 99212 OFFICE O/P EST SF 10 MIN: CPT | Performed by: NURSE PRACTITIONER

## 2019-11-25 PROCEDURE — 97112 NEUROMUSCULAR REEDUCATION: CPT

## 2019-11-25 PROCEDURE — 97110 THERAPEUTIC EXERCISES: CPT

## 2019-11-25 NOTE — THERAPY TREATMENT NOTE
Outpatient Occupational Therapy Neuro Treatment Note  [facility]     Patient Name: Baltazar MELO  : 1950  MRN: [id number]  Today's Date: 2019       Visit Date: 2019    Patient Active Problem List   Diagnosis   • Hypertension   • Morbid obesity with BMI of 40.0-44.9, adult (CMS/[facility])   • Lumbar back pain with radiculopathy affecting right lower extremity   • Diabetes mellitus (CMS/HCC)   • Renal lesion   • Paroxysmal atrial fibrillation (CMS/HCC)   • Post-operative state   • Weakness of both lower extremities        Past Medical History:   Diagnosis Date   • A-fib (CMS/HCC)    • Arthritis    • Creatinine elevation    • Diabetes mellitus (CMS/HCC)    • Elevated cholesterol    • Health care maintenance    • Hyperlipidemia    • Hypertension    • Obesity    • PVC (premature ventricular contraction)         Past Surgical History:   Procedure Laterality Date   • BACK SURGERY     • BREAST CYST EXCISION Left    • BREAST SURGERY Bilateral     Reduction   • CHOLECYSTECTOMY     • HYSTERECTOMY     • LUMBAR DISCECTOMY Left 2019    Procedure: Left Lumbar Three to Lumbar Four and Lumbar Four to Lumbar Five Decompression;  Surgeon: Abhijit Robertson MD;  Location: [facility];  Service: Neurosurgery         Visit Dx:    ICD-10-CM ICD-9-CM   1. Spinal stenosis of lumbar region, unspecified whether neurogenic claudication present M48.061 724.02   2. Generalized weakness R53.1 780.79                   OT Assessment/Plan     Row Name 19 1000          OT Assessment    Assessment Comments  Pt seen for first treatment session following initial evaluation this date, educated on UE therapeutic exercises to perform at home.  -MR       User Key  (r) = Recorded By, (t) = Taken By, (c) = Cosigned By    Initials Name Provider Type    Barbara Akers, OT Occupational Therapist                     Therapy Education  Given: HEP  Program: New(UE therapeutic exercises)  How Provided: Verbal, Demonstration,  "Written  Provided to: Patient  Level of Understanding: Teach back education performed, Verbalized, Demonstrated      OT Exercises     Row Name 11/25/19 1000             Subjective Comments    Subjective Comments  \"I mostly use my walker when I'm at home.\"  -MR         Subjective Pain    Able to rate subjective pain?  yes  -MR      Pre-Treatment Pain Level  0  -MR         Exercise 1    Exercise Name 1  UE therapeutic exercises. Using dowel kathya pt performs forward press, shoulder fleixon, elbow flexion.   -MR      Cueing 1  Verbal;Demo  -MR      Equipment 1  Dowel  -MR      Weights/Plates 1  4  -MR      Sets 1  2  -MR      Reps 1  15  -MR         Exercise 2    Exercise Name 2  UE strengthening and endurance. Using theraband pt performs chest pull, shoulder flexion, shoulder abduction, elbow flexion, forward reach.   -MR      Cueing 2  Verbal;Demo  -MR      Equipment 2  Theraband  -MR      Resistance 2  Red  -MR      Sets 2  2  -MR      Reps 2  15  -MR         Exercise 3    Exercise Name 3  UE ergometer. Pt propels at Level 2 resistance following OT cues.  -MR      Cueing 3  Verbal;Demo  -MR      Equipment 3  UE Ergometer  -MR        User Key  (r) = Recorded By, (t) = Taken By, (c) = Cosigned By    Initials Name Provider Type    Barbara Akers, MORRIS Occupational Therapist                      Time Calculation:   OT Start Time: 1018  OT Stop Time: 1100  OT Time Calculation (min): 42 min  Total Timed Code Minutes- OT: 42 minute(s)     Therapy Charges for Today     Code Description Service Date Service Provider Modifiers Qty    86890211010 HC OT THER PROC EA 15 MIN 11/25/2019 Barbara Nunes OT GO 3                    Barbara Nunes OT  11/25/2019  "

## 2019-11-27 ENCOUNTER — HOSPITAL ENCOUNTER (OUTPATIENT)
Dept: CT IMAGING | Facility: HOSPITAL | Age: 69
Discharge: HOME | End: 2019-11-27
Admitting: FAMILY MEDICINE

## 2019-11-27 DIAGNOSIS — N28.1 COMPLEX RENAL CYST: ICD-10-CM

## 2019-11-27 LAB — CREAT BLDA-MCNC: 1.4 MG/DL (ref 0.6–1.3)

## 2019-11-27 PROCEDURE — 25010000002 IOPAMIDOL 61 % SOLUTION: Performed by: FAMILY MEDICINE

## 2019-11-27 PROCEDURE — 82565 ASSAY OF CREATININE: CPT

## 2019-11-27 PROCEDURE — 74178 CT ABD&PLV WO CNTR FLWD CNTR: CPT

## 2019-11-27 RX ADMIN — IOPAMIDOL 85 ML: 612 INJECTION, SOLUTION INTRAVENOUS at 09:52

## 2019-11-29 ENCOUNTER — APPOINTMENT (OUTPATIENT)
Dept: OCCUPATIONAL THERAPY | Facility: HOSPITAL | Age: 69
End: 2019-11-29

## 2019-11-29 ENCOUNTER — APPOINTMENT (OUTPATIENT)
Dept: PHYSICAL THERAPY | Facility: HOSPITAL | Age: 69
End: 2019-11-29

## 2019-12-02 ENCOUNTER — HOSPITAL ENCOUNTER (OUTPATIENT)
Dept: PHYSICAL THERAPY | Facility: HOSPITAL | Age: 69
Setting detail: THERAPIES SERIES
Discharge: HOME | End: 2019-12-02

## 2019-12-02 ENCOUNTER — HOSPITAL ENCOUNTER (OUTPATIENT)
Dept: OCCUPATIONAL THERAPY | Facility: HOSPITAL | Age: 69
Setting detail: THERAPIES SERIES
Discharge: HOME | End: 2019-12-02

## 2019-12-02 DIAGNOSIS — M48.061 STENOSIS OF LATERAL RECESS OF LUMBAR SPINE: Primary | ICD-10-CM

## 2019-12-02 DIAGNOSIS — R53.1 GENERALIZED WEAKNESS: ICD-10-CM

## 2019-12-02 DIAGNOSIS — R26.89 FUNCTIONAL GAIT ABNORMALITY: ICD-10-CM

## 2019-12-02 DIAGNOSIS — Z98.890 HISTORY OF LUMBAR LAMINECTOMY FOR SPINAL CORD DECOMPRESSION: ICD-10-CM

## 2019-12-02 DIAGNOSIS — M62.81 MUSCLE WEAKNESS (GENERALIZED): ICD-10-CM

## 2019-12-02 DIAGNOSIS — M48.061 SPINAL STENOSIS OF LUMBAR REGION, UNSPECIFIED WHETHER NEUROGENIC CLAUDICATION PRESENT: Primary | ICD-10-CM

## 2019-12-02 PROCEDURE — 97112 NEUROMUSCULAR REEDUCATION: CPT

## 2019-12-02 PROCEDURE — 97110 THERAPEUTIC EXERCISES: CPT

## 2019-12-02 NOTE — THERAPY TREATMENT NOTE
"    Outpatient Physical Therapy Neuro Treatment Note  [facility]     Patient Name: Baltazar MELO  : 1950  MRN: [id number]  Today's Date: 2019      Visit Date: 2019    Visit Dx:    ICD-10-CM ICD-9-CM   1. Stenosis of lateral recess of lumbar spine M48.061 724.02   2. Muscle weakness (generalized) M62.81 728.87   3. Functional gait abnormality R26.89 781.2   4. History of lumbar laminectomy for spinal cord decompression Z98.890 V45.89       Patient Active Problem List   Diagnosis   • Hypertension   • Morbid obesity with BMI of 40.0-44.9, adult (CMS/HCC)   • Lumbar back pain with radiculopathy affecting right lower extremity   • Diabetes mellitus (CMS/HCC)   • Renal lesion   • Paroxysmal atrial fibrillation (CMS/HCC)   • Post-operative state   • Weakness of both lower extremities           PT Neuro     Row Name 19 1100             Subjective Comments    Subjective Comments  \"I didn't do a lot of exercises this weekend but I was very activie. No problems with the brace.\" \"I am thinking about getting another pair of diabetic shoes.\"   -LB         Precautions and Contraindications    Precautions/Limitations  fall precautions  -LB      Precautions  Latex Allergy , spinal surgery 19 GREAT difficulty with the CURB on IE   -LB         Subjective Pain    Able to rate subjective pain?  yes  -LB      Pre-Treatment Pain Level  1  -LB      Post-Treatment Pain Level  1  -LB         Cognition    Overall Cognitive Status  WFL  -LB      Memory  Appears intact  -LB      Orientation Level  Oriented X4  -LB      Safety Judgment  Good awareness of safety precautions  -LB      Deficits  Fully aware of deficits  -LB         Sensation    Additional Comments  pt less sensitive to touch in the legs and feet with ankle exercises   -LB         Posture/Observations    Alignment Options  Forward head  -LB      Forward Head  Mild  -LB      Observations  Edema minimal in bilateral ankles/legs   -LB         Bed " Mobility Assessment/Treatment    Comment (Bed Mobility)  not performed   -LB         Transfers    Sit-Stand [place] (Transfers)  stand by assist;contact guard;verbal cues to an armless chair  -LB      Stand-Sit [place] (Transfers)  stand by assist;contact guard;verbal cues from an armless chair  -LB      Transfers, Sit-Stand-Sit, Assist Device  rolling walker  -LB      Transfer, Safety Issues  balance decreased during turns;sequencing ability decreased;weight-shifting ability decreased  -LB      Comment (Transfers)  Pt demonstrating good weight shift over her LE's   -LB         Gait/Stairs Assessment/Training    [place] Level (Gait)  contact guard;minimum assist (75% patient effort)  -LB      Assistive Device (Gait)  walker, front-wheeled;AFO  -LB      Distance in Feet (Gait)  90' x 2   -LB      Pattern (Gait)  step-to;step-through  -LB      Deviations/Abnormal Patterns (Gait)  gait speed decreased;stride length decreased  -LB      Bilateral Gait Deviations  forward flexed posture strong ER R > L, no pushoff  -LB      Left Sided Gait Deviations  heel strike decreased  -LB      Right Sided Gait Deviations  weight shift ability decreased;heel strike decreased  -LB      [place] Level (Stairs)  not tested  -LB      Comment (Gait/Stairs)  tactile cues to right gluts at stance during ambulation   -LB         Balance Skills Training    Standing-Level of Assistance  Contact guard;Minimum assistance  -LB      Static Standing Balance Support  Right upper extremity supported;Left upper extremity supported;parallel bars  -LB      Standing-Balance Activities  Standing on 1/2 roll;Compliant surfaces  -LB      Gait Balance-Level of Assistance  Contact guard;Minimum assistance  -LB      Gait Balance Support  parallel bars  -LB      Gait Balance Activities  side-stepping  -LB      Balance Comments  gastroc stretch on incline at end of ll bars with B UE support wearing her shoes w/o orthotic or AFO performed  "before balance activities   -LB         Orthotic/Prosthetic Management    Orthosis Location  AFO (ankle foot orthosis)  -LB         Ankle Foot Orthosis Management    Type (Ankle/Foot Orthosis)  --  posterior lateral strut AFO ([place]  -LB      Wearing Schedule (Ankle Foot Orthosis)  wear with activity/work  -LB      Orthosis Training (Ankle Foot Orthosis)  patient and caregiver  -LB        User Key  (r) = Recorded By, (t) = Taken By, (c) = Cosigned By    Initials Name Provider Type    Nalini Becker, PT Physical Therapist                  PT Assessment/Plan     Row Name 12/02/19 1617          PT Assessment    Assessment Comments  Pt was unable to perform hip abduction/ER in sidelying with either LE. Pt has increased her right stance and decreased right hip retraction.   -LB       User Key  (r) = Recorded By, (t) = Taken By, (c) = Cosigned By    Initials Name Provider Type    Nalini Becker PT Physical Therapist             OP Exercises     Row Name 12/02/19 1100             Subjective Comments    Subjective Comments  \"I didn't do a lot of exercises this weekend but I was very activie. No problems with the brace.\" \"I am thinking about getting another pair of diabetic shoes.\"   -LB         Subjective Pain    Able to rate subjective pain?  yes  -LB      Pre-Treatment Pain Level  1  -LB      Post-Treatment Pain Level  1  -LB         Exercise 1    Exercise Name 1  gastroc stretch on incline at end of ll bars with B UE support wearing her shoes w/o orthotic or AFO performed before balance activities  -LB      Cueing 1  Verbal;Tactile  -LB      Reps 1  2  -LB      Time 1  60  -LB         Exercise 2    Exercise Name 2  Step-ups onto 4\" step within the ll bars with strong CGA   -LB      Cueing 2  Verbal;Tactile  -LB      Reps 2  10 each LE  -LB      Additional Comments  definite need to use UE's   -LB         Exercise 3    Exercise Name 3  AROM / RROM  bilateral ankles in all planes   -LB      Cueing 3  " Verbal;Tactile  -LB      Reps 3  7  -LB         Exercise 4    Exercise Name 4  gastroc stretch over edge of 1/2 styrofoam roll while wearing shoes without AFO  -LB      Cueing 4  Verbal;Tactile  -LB         Exercise 5    Exercise Name 5  wt shifting to each LE while raising opposite heel   -LB      Cueing 5  Verbal;Tactile  -LB      Reps 5  10  -LB      Additional Comments  minimal tactile cues to bilateral gluts  -LB         Exercise 6    Exercise Name 6  hip abduction/ER in sidelying pt was unable to lift either LE so performed in hooklying  (Significant)   -LB      Cueing 6  Verbal;Tactile  -LB      Sets 6  2  -LB      Reps 6  5 each LE   -LB      Additional Comments  emphasized to pt to tighten abdominals   -LB        User Key  (r) = Recorded By, (t) = Taken By, (c) = Cosigned By    Initials Name Provider Type    Nalini Becker, PT Physical Therapist                          Therapy Education  Education Details: Emphasized the importance of wearing supportive shoes and orthotics for stability.   Given: Symptoms/condition management  How Provided: Verbal  Provided to: Patient  Level of Understanding: Verbalized              Time Calculation:   Start Time: 1100  Stop Time: 1145  Time Calculation (min): 45 min  Total Timed Code Minutes- PT: 45 minute(s)   Therapy Charges for Today     Code Description Service Date Service Provider Modifiers Qty    20865325553 HC PT NEUROMUSC RE EDUCATION EA 15 MIN 12/2/2019 Nalini Mccall, PT GP 3                    Nalini Mccall, PT  12/2/2019

## 2019-12-02 NOTE — THERAPY TREATMENT NOTE
Outpatient Occupational Therapy Neuro Treatment Note  [facility]     Patient Name: Baltazar MELO  : 1950  MRN: [id number]  Today's Date: 2019       Visit Date: 2019    Patient Active Problem List   Diagnosis   • Hypertension   • Morbid obesity with BMI of 40.0-44.9, adult (CMS/[facility])   • Lumbar back pain with radiculopathy affecting right lower extremity   • Diabetes mellitus (CMS/[facility])   • Renal lesion   • Paroxysmal atrial fibrillation (CMS/HCC)   • Post-operative state   • Weakness of both lower extremities        Past Medical History:   Diagnosis Date   • A-fib (CMS/[facility])    • Arthritis    • Creatinine elevation    • Diabetes mellitus (CMS/[facility])    • Elevated cholesterol    • Health care maintenance    • Hyperlipidemia    • Hypertension    • Obesity    • PVC (premature ventricular contraction)         Past Surgical History:   Procedure Laterality Date   • BACK SURGERY     • BREAST CYST EXCISION Left    • BREAST SURGERY Bilateral     Reduction   • CHOLECYSTECTOMY     • HYSTERECTOMY     • LUMBAR DISCECTOMY Left 2019    Procedure: Left Lumbar Three to Lumbar Four and Lumbar Four to Lumbar Five Decompression;  Surgeon: Abhijit Robertson MD;  Location: [facility];  Service: Neurosurgery         Visit Dx:    ICD-10-CM ICD-9-CM   1. Spinal stenosis of lumbar region, unspecified whether neurogenic claudication present M48.061 724.02   2. Generalized weakness R53.1 780.79                   OT Assessment/Plan     Row Name 19 1106          OT Assessment    Assessment Comments  Pt demos progress with standing endurance this date, able to tolerate standing for 12 minutes during activity with OT.  -MR       User Key  (r) = Recorded By, (t) = Taken By, (c) = Cosigned By    Initials Name Provider Type    Barbara Akers, OT Occupational Therapist                     Therapy Education  Given: HEP  Program: Reinforced(UE strengthening)  How Provided: Verbal, Demonstration  Provided to:  "Patient  Level of Understanding: Teach back education performed, Verbalized, Demonstrated      OT Exercises     Row Name 12/02/19 1000             Subjective Comments    Subjective Comments  \"I didn't get a chance to do any exercises over the weekend, I was visiting with family.\"  -MR         Subjective Pain    Able to rate subjective pain?  yes  -MR      Pre-Treatment Pain Level  0  -MR         Exercise 1    Exercise Name 1  UE strengthening and endurance. Using hand weights pt performs forward press, shoulder flexion, abduction, upright row, elbow flexion.  -MR      Cueing 1  Verbal;Demo  -MR      Equipment 1  Dumbell  -MR      Weights/Plates 1  2  -MR      Sets 1  3  -MR      Reps 1  10  -MR         Exercise 2    Exercise Name 2  Pt performs activity to increase standing endurance for improved safety and independence with daily functional tasks. While standing at counter, pt places and removes PVC rings onto vertical pegboard following OT demo/cues. Pt performs reaching across midline and overhead with close supervision for balance, able to tolerate standing ~12 minutes.  -MR      Cueing 2  Verbal;Demo  -MR         Exercise 3    Exercise Name 3  Pt propels UE ergometer at Level 2 resistance to increase UE strength and endurance.  -MR      Cueing 3  Verbal;Demo  -MR      Equipment 3  UE Ergometer  -MR        User Key  (r) = Recorded By, (t) = Taken By, (c) = Cosigned By    Initials Name Provider Type    Barbara Akers OT Occupational Therapist                      Time Calculation:   OT Start Time: 1017  OT Stop Time: 1059  OT Time Calculation (min): 42 min  Total Timed Code Minutes- OT: 42 minute(s)     Therapy Charges for Today     Code Description Service Date Service Provider Modifiers Qty    [id number]  OT THER PROC EA 15 MIN 12/2/2019 Barbara Nunes, MORRIS GO 3                    Barbara Nunes OT  12/2/2019  "

## 2019-12-06 ENCOUNTER — HOSPITAL ENCOUNTER (OUTPATIENT)
Dept: OCCUPATIONAL THERAPY | Facility: HOSPITAL | Age: 69
Setting detail: THERAPIES SERIES
Discharge: HOME | End: 2019-12-06

## 2019-12-06 ENCOUNTER — HOSPITAL ENCOUNTER (OUTPATIENT)
Dept: PHYSICAL THERAPY | Facility: HOSPITAL | Age: 69
Setting detail: THERAPIES SERIES
Discharge: HOME | End: 2019-12-06

## 2019-12-06 DIAGNOSIS — M48.061 SPINAL STENOSIS OF LUMBAR REGION, UNSPECIFIED WHETHER NEUROGENIC CLAUDICATION PRESENT: Primary | ICD-10-CM

## 2019-12-06 DIAGNOSIS — R53.1 GENERALIZED WEAKNESS: ICD-10-CM

## 2019-12-06 DIAGNOSIS — R26.89 FUNCTIONAL GAIT ABNORMALITY: ICD-10-CM

## 2019-12-06 DIAGNOSIS — M62.81 MUSCLE WEAKNESS (GENERALIZED): ICD-10-CM

## 2019-12-06 DIAGNOSIS — M48.061 STENOSIS OF LATERAL RECESS OF LUMBAR SPINE: Primary | ICD-10-CM

## 2019-12-06 DIAGNOSIS — Z98.890 HISTORY OF LUMBAR LAMINECTOMY FOR SPINAL CORD DECOMPRESSION: ICD-10-CM

## 2019-12-06 PROCEDURE — 97110 THERAPEUTIC EXERCISES: CPT | Performed by: OCCUPATIONAL THERAPIST

## 2019-12-06 PROCEDURE — 97112 NEUROMUSCULAR REEDUCATION: CPT | Performed by: OCCUPATIONAL THERAPIST

## 2019-12-06 PROCEDURE — 97112 NEUROMUSCULAR REEDUCATION: CPT

## 2019-12-06 NOTE — THERAPY TREATMENT NOTE
Outpatient Occupational Therapy Neuro Treatment Note  [facility]     Patient Name: Baltazar MELO  : 1950  MRN: [id number]  Today's Date: 2019       Visit Date: 2019    Patient Active Problem List   Diagnosis   • Hypertension   • Morbid obesity with BMI of 40.0-44.9, adult (CMS/[facility])   • Lumbar back pain with radiculopathy affecting right lower extremity   • Diabetes mellitus (CMS/[facility])   • Renal lesion   • Paroxysmal atrial fibrillation (CMS/HCC)   • Post-operative state   • Weakness of both lower extremities        Past Medical History:   Diagnosis Date   • A-fib (CMS/[facility])    • Arthritis    • Creatinine elevation    • Diabetes mellitus (CMS/[facility])    • Elevated cholesterol    • Health care maintenance    • Hyperlipidemia    • Hypertension    • Obesity    • PVC (premature ventricular contraction)         Past Surgical History:   Procedure Laterality Date   • BACK SURGERY     • BREAST CYST EXCISION Left    • BREAST SURGERY Bilateral     Reduction   • CHOLECYSTECTOMY     • HYSTERECTOMY     • LUMBAR DISCECTOMY Left 2019    Procedure: Left Lumbar Three to Lumbar Four and Lumbar Four to Lumbar Five Decompression;  Surgeon: Abhijit Robertson MD;  Location: [facility];  Service: Neurosurgery         Visit Dx:    ICD-10-CM ICD-9-CM   1. Spinal stenosis of lumbar region, unspecified whether neurogenic claudication present M48.061 724.02   2. Generalized weakness R53.1 780.79                   OT Assessment/Plan     Row Name 19 1207          OT Assessment    Assessment Comments  Pt performs UE strengthing exercises with DB, tolerates well and able to do 3 sets x10 reps with 2# DB.  -SG       User Key  (r) = Recorded By, (t) = Taken By, (c) = Cosigned By    Initials Name Provider Type    Kalee Bruce OTR Occupational Therapist                     Therapy Education  Education Details: UE DB exercises.      OT Exercises     Row Name 19 0900             Precautions    Existing  "Precautions/Restrictions  fall  -SG         Subjective Comments    Subjective Comments  No new concerns.  -SG         Subjective Pain    Able to rate subjective pain?  yes  -SG      Subjective Pain Comment  \"A little achey today because of the weather.\"  -SG         Exercise 1    Exercise Name 1  UE strengthing: shoulder add/abd, IR/ER, shoulder flex/ext, overhead press, elbow flex/ext, wrist flex/ext, pro/sup  -SG      Cueing 1  Verbal;Demo  -SG      Equipment 1  Dumbell  -SG      Weights/Plates 1  2  -SG      Sets 1  3  -SG      Reps 1  10  -SG         Exercise 2    Exercise Name 2  Pt performs standing activity using BUE for reaching to place objects on verticle target at doorway without using UE for support. Pt stands for 7 min without rest break, she does report some fatigue following standing task.  -SG      Cueing 2  Verbal;Demo  -SG        User Key  (r) = Recorded By, (t) = Taken By, (c) = Cosigned By    Initials Name Provider Type     Kalee Smith OTR Occupational Therapist                      Time Calculation:   OT Start Time: 0845  OT Stop Time: 0932  OT Time Calculation (min): 47 min     Therapy Charges for Today     Code Description Service Date Service Provider Modifiers Qty    68887124115  OT NEUROMUSC RE EDUCATION EA 15 MIN 12/6/2019 Kalee Smith OTR GO 1    [id number]  OT THER PROC EA 15 MIN 12/6/2019 Kalee Smith OTR GO 2                    BILLY Peoples  12/6/2019  "

## 2019-12-06 NOTE — THERAPY TREATMENT NOTE
"    Outpatient Physical Therapy Neuro Treatment Note  [facility]     Patient Name: Baltazar MELO  : 1950  MRN: [id number]  Today's Date: 2019      Visit Date: 2019    Visit Dx:    ICD-10-CM ICD-9-CM   1. Stenosis of lateral recess of lumbar spine M48.061 724.02   2. Muscle weakness (generalized) M62.81 728.87   3. Functional gait abnormality R26.89 781.2   4. History of lumbar laminectomy for spinal cord decompression Z98.890 V45.89       Patient Active Problem List   Diagnosis   • Hypertension   • Morbid obesity with BMI of 40.0-44.9, adult (CMS/HCC)   • Lumbar back pain with radiculopathy affecting right lower extremity   • Diabetes mellitus (CMS/HCC)   • Renal lesion   • Paroxysmal atrial fibrillation (CMS/[facility])   • Post-operative state   • Weakness of both lower extremities           PT Neuro     Row Name 19 0969             Subjective Comments    Subjective Comments  \"I feel like my right leg is shorter because I have this brace on.\" \"I am getting a new pair of [employer] shoes to wear in therapy.\"   -LB         Precautions and Contraindications    Precautions/Limitations  fall precautions  -LB      Precautions  Latex Allergy , spinal surgery 19 GREAT difficulty with the CURB on IE   -LB         Subjective Pain    Able to rate subjective pain?  yes  -LB      Pre-Treatment Pain Level  0  -LB      Post-Treatment Pain Level  0  -LB         Cognition    Overall Cognitive Status  WFL  -LB      Memory  Appears intact  -LB      Orientation Level  Oriented X4  -LB      Safety Judgment  Good awareness of safety precautions  -LB      Deficits  Fully aware of deficits  -LB         Sensation    Additional Comments  Pt reporting she can tell she is so glad she is not as sensitive.   -LB         Posture/Observations    Alignment Options  Forward head  -LB      Forward Head  Mild  -LB      Observations  Edema  minimal in bilateral ankles/legs   -LB         Bed Mobility Assessment/Treatment    " "Rolling Left [place] (Bed Mobility)  not tested  -LB         Transfers    Sit-Stand [place] (Transfers)  contact guard from an armless chair   -LB      Stand-Sit [place] (Transfers)  contact guard to an armless chair  -LB      Transfers, Sit-Stand-Sit, Assist Device  -- no device   -LB      Transfer, Safety Issues  balance decreased during turns;sequencing ability decreased;weight-shifting ability decreased  -LB      Comment (Transfers)  Pt comes to stand and to sit from armless chair to a walker with SBA.   -LB         Gait/Stairs Assessment/Training    [place] Level (Gait)  contact guard;minimum assist (75% patient effort)  -LB      Assistive Device (Gait)  walker, front-wheeled;AFO  -LB      Distance in Feet (Gait)  100' x 2 tactile cues to bilateral gluts with emphasis on wt shift fully to the left   -LB      Pattern (Gait)  step-to;step-through  -LB      Deviations/Abnormal Patterns (Gait)  gait speed decreased;stride length decreased  -LB      Bilateral Gait Deviations  forward flexed posture  -LB      Right Sided Gait Deviations  weight shift ability decreased;heel strike decreased R LE minimally adducts at times   -LB      [place] Level (Stairs)  not tested  -LB         Balance Skills Training    Standing-Level of Assistance  Contact guard;Minimum assistance  -LB      Static Standing Balance Support  Right upper extremity supported;Left upper extremity supported;parallel bars  -LB      Standing-Balance Activities  Standing on 1/2 roll;Compliant surfaces 4\" foam with feet even and then one foot forward   -LB      Gait Balance-Level of Assistance  Contact guard;Minimum assistance  -LB      Gait Balance Support  parallel bars;Right upper extremity supported;Left upper extremity supported  -LB      Gait Balance Activities  backwards 8' x 2   -LB      Balance Comments  gastroc stretch on incline at end of ll bars with B UE support wearing her shoes w/o orthotic or AFO performed before " "balance activities   -LB         Orthotic/Prosthetic Management    Orthosis Location  AFO (ankle foot orthosis)  -LB         Ankle Foot Orthosis Management    Type (Ankle/Foot Orthosis)  -- posterior lateral strut AFO ([place]  -LB      Wearing Schedule (Ankle Foot Orthosis)  wear with activity/work  -LB      Compliance/Wearing Issues (Ankle Foot Orthosis)  patient/caregiver comprehend strategies Pt reporting she is able to wear L AFO all day w/o any issue  -LB        User Key  (r) = Recorded By, (t) = Taken By, (c) = Cosigned By    Initials Name Provider Type    Nalini Becker, PT Physical Therapist                  PT Assessment/Plan     Row Name 12/06/19 1214          PT Assessment    Assessment Comments  Pt reporting she felt her right Le was shorteer than the left when ambulating. Pt was unable to place a heel lift in the shoe because the current orthotic was already raising her up in the shoe. PT discussed that R LE adduction may be causing her to feel the R LE is shorter.   -LB       User Key  (r) = Recorded By, (t) = Taken By, (c) = Cosigned By    Initials Name Provider Type    Nalini Becker, PT Physical Therapist             OP Exercises     Row Name 12/06/19 0930 12/06/19 0900          Precautions    Existing Precautions/Restrictions  --  fall  -SG        Subjective Comments    Subjective Comments  \"I feel like my right leg is shorter because I have this brace on.\" \"I am getting a new pair of [employer] shoes to wear in therapy.\"   -LB  --        Subjective Pain    Able to rate subjective pain?  yes  -LB  --     Pre-Treatment Pain Level  0  -LB  --     Post-Treatment Pain Level  0  -LB  --        Exercise 1    Exercise Name 1  gastroc stretch on incline at end of ll bars with B UE support wearing her shoes w/o orthotic or AFO performed before balance activities  -LB  --     Cueing 1  Verbal;Tactile  -LB  --     Reps 1  2  -LB  --     Time 1  90  -LB  --        Exercise 2    Exercise Name 2  Step-ups " "onto 4\" step within the ll bars with strong CGA   -LB  --     Cueing 2  Verbal;Tactile  -LB  --     Reps 2  12 each LE   -LB  --     Additional Comments  definite need to use UE's   -LB  --        Exercise 3    Exercise Name 3  AROM / RROM  bilateral ankles in all planes   -LB  --     Cueing 3  Verbal;Tactile  -LB  --     Reps 3  7  -LB  --        Exercise 7    Exercise Name 7  knee flexion each LE in standing within ll bars with minimal tactile cues to fully shift to the L LE  -LB  --     Cueing 7  Verbal;Tactile  -LB  --     Reps 7  7 each LE   -LB  --     Additional Comments  STRONGLY cues to isolate knee flexion verses hip flexion   -LB  --       User Key  (r) = Recorded By, (t) = Taken By, (c) = Cosigned By    Initials Name Provider Type    Nalini Becker, PT Physical Therapist    Kalee Bruce, OTR Occupational Therapist                          Therapy Education  Education Details: Discussed with pt to buy athletic shoes with good stability and that can be worn with the AFO if needed. Ask pt's  to measure the height of their steps and the height of the rail up into their kitchen.   Given: Other (comment)  How Provided: Verbal  Provided to: Patient  Level of Understanding: Verbalized              Time Calculation:   Start Time: 1015  Stop Time: 1100  Time Calculation (min): 45 min  Total Timed Code Minutes- PT: 45 minute(s)   Therapy Charges for Today     Code Description Service Date Service Provider Modifiers Qty    22427185701 HC PT NEUROMUSC RE EDUCATION EA 15 MIN 12/6/2019 Nalini Mccall, PT GP 3                    Nalini Mccall, PT  12/6/2019     "

## 2019-12-09 ENCOUNTER — HOSPITAL ENCOUNTER (OUTPATIENT)
Dept: OCCUPATIONAL THERAPY | Facility: HOSPITAL | Age: 69
Setting detail: THERAPIES SERIES
Discharge: HOME | End: 2019-12-09

## 2019-12-09 ENCOUNTER — HOSPITAL ENCOUNTER (OUTPATIENT)
Dept: PHYSICAL THERAPY | Facility: HOSPITAL | Age: 69
Setting detail: THERAPIES SERIES
Discharge: HOME | End: 2019-12-09

## 2019-12-09 DIAGNOSIS — R26.89 FUNCTIONAL GAIT ABNORMALITY: ICD-10-CM

## 2019-12-09 DIAGNOSIS — M48.061 SPINAL STENOSIS OF LUMBAR REGION, UNSPECIFIED WHETHER NEUROGENIC CLAUDICATION PRESENT: Primary | ICD-10-CM

## 2019-12-09 DIAGNOSIS — Z98.890 HISTORY OF LUMBAR LAMINECTOMY FOR SPINAL CORD DECOMPRESSION: ICD-10-CM

## 2019-12-09 DIAGNOSIS — R53.1 GENERALIZED WEAKNESS: ICD-10-CM

## 2019-12-09 DIAGNOSIS — M48.061 STENOSIS OF LATERAL RECESS OF LUMBAR SPINE: Primary | ICD-10-CM

## 2019-12-09 DIAGNOSIS — M62.81 MUSCLE WEAKNESS (GENERALIZED): ICD-10-CM

## 2019-12-09 PROCEDURE — 97110 THERAPEUTIC EXERCISES: CPT

## 2019-12-09 PROCEDURE — 97112 NEUROMUSCULAR REEDUCATION: CPT

## 2019-12-09 NOTE — THERAPY TREATMENT NOTE
"    Outpatient Physical Therapy Neuro Treatment Note  [facility]     Patient Name: Baltazar MELO  : 1950  MRN: [id number]  Today's Date: 2019      Visit Date: 2019    Visit Dx:    ICD-10-CM ICD-9-CM   1. Stenosis of lateral recess of lumbar spine M48.061 724.02   2. Muscle weakness (generalized) M62.81 728.87   3. Functional gait abnormality R26.89 781.2   4. History of lumbar laminectomy for spinal cord decompression Z98.890 V45.89       Patient Active Problem List   Diagnosis   • Hypertension   • Morbid obesity with BMI of 40.0-44.9, adult (CMS/HCC)   • Lumbar back pain with radiculopathy affecting right lower extremity   • Diabetes mellitus (CMS/HCC)   • Renal lesion   • Paroxysmal atrial fibrillation (CMS/HCC)   • Post-operative state   • Weakness of both lower extremities           PT Neuro     Row Name 19 1105             Subjective Comments    Subjective Comments  \"I have good news. My CT scan was good.\" \"I feel like I did good on the steps for the first time.\" \"I love my new shoes.\"   -LB         Precautions and Contraindications    Precautions/Limitations  fall precautions  -LB      Precautions  Latex Allergy , spinal surgery 19 GREAT difficulty with the CURB on IE   -LB         Subjective Pain    Able to rate subjective pain?  yes  -LB      Pre-Treatment Pain Level  0  -LB      Post-Treatment Pain Level  0  -LB         Cognition    Overall Cognitive Status  WFL  -LB      Memory  Appears intact  -LB      Orientation Level  Oriented X4  -LB      Safety Judgment  Good awareness of safety precautions  -LB      Deficits  Fully aware of deficits  -LB         Posture/Observations    Alignment Options  Forward head  -LB      Forward Head  Mild  -LB      Observations  Edema minimal in bilateral ankles and legs  -LB         Bed Mobility Assessment/Treatment    Rolling Left [place] (Bed Mobility)  not tested  -LB         Transfers    Sit-Stand [place] (Transfers)  contact " "guard  -LB      Stand-Sit [place] (Transfers)  contact guard  -LB      Transfers, Sit-Stand-Sit, Assist Device  rolling walker  -LB         Gait/Stairs Assessment/Training    [place] Level (Gait)  contact guard;minimum assist (75% patient effort)  -LB      Assistive Device (Gait)  walker, front-wheeled;AFO  -LB      Distance in Feet (Gait)  90' x 2   -LB      Pattern (Gait)  step-to;step-through  -LB      Deviations/Abnormal Patterns (Gait)  gait speed decreased;stride length decreased  -LB      Bilateral Gait Deviations  forward flexed posture  -LB      Left Sided Gait Deviations  heel strike decreased;weight shift ability decreased does not fully shift lateral to the L LE   -LB      Right Sided Gait Deviations  weight shift ability decreased R LE minimally adducts at times   -LB      Negotiation (Stairs)  other (see comments) Pt's steps at home are 8\" in ht, handrail ht 32\" to 35\"   -LB      [place] Level (Stairs)  maximum assist (25% patient effort);other (see comments) HHA on PT hand, CGA of another  -LB      Handrail Location (Stairs)  right side (ascending)  -LB      Number of Steps (Stairs)  3  (7\" steps)   -LB      Ascending Technique (Stairs)  step-to-step  -LB      Descending Technique (Stairs)  step-to-step  -LB      Stairs, Safety Issues  sequencing ability decreased;weight-shifting ability decreased  -LB      Stairs, Impairments  strength decreased;impaired balance  -LB      Comment (Gait/Stairs)  Definte need of PT's hand with ascending and descending steps, ascending the steps vary labored.   -LB         Balance Skills Training    Standing-Level of Assistance  Contact guard;Minimum assistance  -LB      Static Standing Balance Support  Right upper extremity supported;Left upper extremity supported;parallel bars  -LB      Standing-Balance Activities  Standing on 1/2 roll;Compliant surfaces 4\" foam w/ feet even and then one foot forward (w/o orthotic  -LB      Gait Balance-Level of " "Assistance  Contact guard;Minimum assistance  -LB      Gait Balance Support  parallel bars;Right upper extremity supported;Left upper extremity supported  -LB      Gait Balance Activities  side-stepping cues to move slowly , w/o orthotic   -LB      Balance Comments  gastroc stretch on incline at end of ll bars with B UE support wearing her shoes w/o orthotic or AFO performed before balance activities   -LB         Orthotic/Prosthetic Management    Orthosis Location  AFO (ankle foot orthosis)  -LB         Ankle Foot Orthosis Management    Wearing Schedule (Ankle Foot Orthosis)  wear with activity/work  -LB      Orthosis Training (Ankle Foot Orthosis)  patient and caregiver  -LB        User Key  (r) = Recorded By, (t) = Taken By, (c) = Cosigned By    Initials Name Provider Type    Nalini Becker, PT Physical Therapist                  PT Assessment/Plan     Row Name 12/09/19 1254          PT Assessment    Assessment Comments  Pt plaesed her Ct scan was good. Pt reporting she loves her new shoes becasue they are more supportive. Pt was able to perform steps for the first time. Pt required maximal HHA when ascendign and descending steps. Pt was advised not to try steps at home at this time.   -LB       User Key  (r) = Recorded By, (t) = Taken By, (c) = Cosigned By    Initials Name Provider Type    Nalini Becker, PT Physical Therapist             OP Exercises     Row Name 12/09/19 1105             Subjective Comments    Subjective Comments  \"I have good news. My CT scan was good.\" \"I feel like I did good on the steps for the first time.\" \"I love my new shoes.\"   -LB         Subjective Pain    Able to rate subjective pain?  yes  -LB      Pre-Treatment Pain Level  0  -LB      Post-Treatment Pain Level  0  -LB        User Key  (r) = Recorded By, (t) = Taken By, (c) = Cosigned By    Initials Name Provider Type    Nalini Becker, PT Physical Therapist                                         Time Calculation:   Start " Time: 1105  Stop Time: 1151  Time Calculation (min): 46 min  Total Timed Code Minutes- PT: 46 minute(s)   Therapy Charges for Today     Code Description Service Date Service Provider Modifiers Qty    44082722275 HC PT NEUROMUSC RE EDUCATION EA 15 MIN 12/9/2019 Nalini Mccall, PT GP 3                    Nalini Mccall, PT  12/9/2019

## 2019-12-09 NOTE — THERAPY TREATMENT NOTE
Outpatient Occupational Therapy Neuro Treatment Note  [facility]     Patient Name: Baltazar MELO  : 1950  MRN: [id number]  Today's Date: 2019       Visit Date: 2019    Patient Active Problem List   Diagnosis   • Hypertension   • Morbid obesity with BMI of 40.0-44.9, adult (CMS/[facility])   • Lumbar back pain with radiculopathy affecting right lower extremity   • Diabetes mellitus (CMS/[facility])   • Renal lesion   • Paroxysmal atrial fibrillation (CMS/HCC)   • Post-operative state   • Weakness of both lower extremities        Past Medical History:   Diagnosis Date   • A-fib (CMS/[facility])    • Arthritis    • Creatinine elevation    • Diabetes mellitus (CMS/[facility])    • Elevated cholesterol    • Health care maintenance    • Hyperlipidemia    • Hypertension    • Obesity    • PVC (premature ventricular contraction)         Past Surgical History:   Procedure Laterality Date   • BACK SURGERY     • BREAST CYST EXCISION Left    • BREAST SURGERY Bilateral     Reduction   • CHOLECYSTECTOMY     • HYSTERECTOMY     • LUMBAR DISCECTOMY Left 2019    Procedure: Left Lumbar Three to Lumbar Four and Lumbar Four to Lumbar Five Decompression;  Surgeon: Abhijit Robertson MD;  Location: [facility];  Service: Neurosurgery         Visit Dx:    ICD-10-CM ICD-9-CM   1. Spinal stenosis of lumbar region, unspecified whether neurogenic claudication present M48.061 724.02   2. Generalized weakness R53.1 780.79                   OT Assessment/Plan     Row Name 19 1056          OT Assessment    Assessment Comments  Pt reports not feeling well this am, is able to tolerate all UE strengthening and endurance exercises with no complaints.  -MR       User Key  (r) = Recorded By, (t) = Taken By, (c) = Cosigned By    Initials Name Provider Type    Barbara Akers, OT Occupational Therapist                     Therapy Education  Given: HEP  Program: Reinforced(UE strengthening)  How Provided: Verbal, Demonstration  Provided to:  "Patient  Level of Understanding: Teach back education performed, Verbalized, Demonstrated      OT Exercises     Row Name 12/09/19 1000             Subjective Comments    Subjective Comments  \"I almost called in, I don't feel as good today.\" Pt reports cough/cold symptoms   -MR         Subjective Pain    Able to rate subjective pain?  yes  -MR      Pre-Treatment Pain Level  0  -MR         Exercise 1    Exercise Name 1  UE therapeutic exercise. Using weighted dowel pt performs forward press, shoulder flexion, abduction, elbow flexion.   -MR      Cueing 1  Verbal;Demo  -MR      Equipment 1  Dowel  -MR      Weights/Plates 1  4  -MR      Sets 1  3  -MR      Reps 1  10  -MR         Exercise 2    Exercise Name 2  Pt propels UE ergometer at Level 2 resistance using BUEs.  -MR      Cueing 2  Verbal  -MR      Equipment 2  UE Ergometer  -MR        User Key  (r) = Recorded By, (t) = Taken By, (c) = Cosigned By    Initials Name Provider Type    MR GrandeneenaBarbara, OT Occupational Therapist                      Time Calculation:   OT Start Time: 1020  OT Stop Time: 1100  OT Time Calculation (min): 40 min  Total Timed Code Minutes- OT: 40 minute(s)     Therapy Charges for Today     Code Description Service Date Service Provider Modifiers Qty    11591306506 HC OT THER PROC EA 15 MIN 12/9/2019 Barbara Nunes, OT GO 3                    Barbara Nunes OT  12/9/2019  "

## 2019-12-13 ENCOUNTER — HOSPITAL ENCOUNTER (OUTPATIENT)
Dept: PHYSICAL THERAPY | Facility: HOSPITAL | Age: 69
Setting detail: THERAPIES SERIES
Discharge: HOME | End: 2019-12-13

## 2019-12-13 ENCOUNTER — HOSPITAL ENCOUNTER (OUTPATIENT)
Dept: OCCUPATIONAL THERAPY | Facility: HOSPITAL | Age: 69
Setting detail: THERAPIES SERIES
Discharge: HOME | End: 2019-12-13

## 2019-12-13 DIAGNOSIS — M48.061 STENOSIS OF LATERAL RECESS OF LUMBAR SPINE: Primary | ICD-10-CM

## 2019-12-13 DIAGNOSIS — R26.89 FUNCTIONAL GAIT ABNORMALITY: ICD-10-CM

## 2019-12-13 DIAGNOSIS — Z98.890 HISTORY OF LUMBAR LAMINECTOMY FOR SPINAL CORD DECOMPRESSION: ICD-10-CM

## 2019-12-13 DIAGNOSIS — R53.1 GENERALIZED WEAKNESS: ICD-10-CM

## 2019-12-13 DIAGNOSIS — M62.81 MUSCLE WEAKNESS (GENERALIZED): ICD-10-CM

## 2019-12-13 DIAGNOSIS — M48.061 SPINAL STENOSIS OF LUMBAR REGION, UNSPECIFIED WHETHER NEUROGENIC CLAUDICATION PRESENT: Primary | ICD-10-CM

## 2019-12-13 PROCEDURE — 97110 THERAPEUTIC EXERCISES: CPT | Performed by: OCCUPATIONAL THERAPIST

## 2019-12-13 PROCEDURE — 97112 NEUROMUSCULAR REEDUCATION: CPT

## 2019-12-13 NOTE — THERAPY TREATMENT NOTE
Outpatient Occupational Therapy Neuro Treatment Note  [facility]     Patient Name: Baltazar MELO  : 1950  MRN: [id number]  Today's Date: 2019       Visit Date: 2019    Patient Active Problem List   Diagnosis   • Hypertension   • Morbid obesity with BMI of 40.0-44.9, adult (CMS/[facility])   • Lumbar back pain with radiculopathy affecting right lower extremity   • Diabetes mellitus (CMS/HCC)   • Renal lesion   • Paroxysmal atrial fibrillation (CMS/HCC)   • Post-operative state   • Weakness of both lower extremities        Past Medical History:   Diagnosis Date   • A-fib (CMS/HCC)    • Arthritis    • Creatinine elevation    • Diabetes mellitus (CMS/HCC)    • Elevated cholesterol    • Health care maintenance    • Hyperlipidemia    • Hypertension    • Obesity    • PVC (premature ventricular contraction)         Past Surgical History:   Procedure Laterality Date   • BACK SURGERY     • BREAST CYST EXCISION Left    • BREAST SURGERY Bilateral     Reduction   • CHOLECYSTECTOMY     • HYSTERECTOMY     • LUMBAR DISCECTOMY Left 2019    Procedure: Left Lumbar Three to Lumbar Four and Lumbar Four to Lumbar Five Decompression;  Surgeon: Abhijit Robertson MD;  Location: [facility];  Service: Neurosurgery         Visit Dx:    ICD-10-CM ICD-9-CM   1. Spinal stenosis of lumbar region, unspecified whether neurogenic claudication present M48.061 724.02   2. Generalized weakness R53.1 780.79                   OT Assessment/Plan     Row Name 19 1114          OT Assessment    Assessment Comments  Pt tolerates strengthening exercises well sitting on EOM with green theraband. Pt does show some shoulder weakness and difficulty with full reaching full ROM with green theraband.   -ASAD       User Key  (r) = Recorded By, (t) = Taken By, (c) = Cosigned By    Initials Name Provider Type    Kalee Bruce OTR Occupational Therapist                            OT Exercises     Row Name 19 1100              "Precautions    Existing Precautions/Restrictions  fall  -SG         Subjective Comments    Subjective Comments  \"I'm feeling much better.\"  -SG         Subjective Pain    Able to rate subjective pain?  yes  -SG      Pre-Treatment Pain Level  0  -SG      Post-Treatment Pain Level  0  -SG         Exercise 1    Exercise Name 1  Pt performs UE strengthing with green theraband. Pt requires vcing for shoulder exercises for full ROM. Pt performs horiz add/abd, shoulder flex/ext, elbow flex/ext.  -SG      Cueing 1  Verbal;Demo;Auditory  -SG      Equipment 1  Theraband  -SG      Resistance 1  Green  -SG      Sets 1  3  -SG      Reps 1  10  -SG         Exercise 2    Exercise Name 2  Josefa seated in w/c.  -SG      Cueing 2  Verbal  -SG      Weights/Plates 2  20  -SG      Sets 2  3  -SG      Reps 2  10  -SG        User Key  (r) = Recorded By, (t) = Taken By, (c) = Cosigned By    Initials Name Provider Type    SG Kalee Smith OTR Occupational Therapist                      Time Calculation:   OT Start Time: 0845  OT Stop Time: 0930  OT Time Calculation (min): 45 min     Therapy Charges for Today     Code Description Service Date Service Provider Modifiers Qty    02588834102  OT THER PROC EA 15 MIN 12/13/2019 Kalee Smith OTR GO 3                    BILLY Peoples  12/13/2019  "

## 2019-12-13 NOTE — THERAPY TREATMENT NOTE
"    Outpatient Physical Therapy Neuro Treatment Note  [facility]     Patient Name: Baltazar MELO  : 1950  MRN: [id number]  Today's Date: 2019      Visit Date: 2019    Visit Dx:    ICD-10-CM ICD-9-CM   1. Stenosis of lateral recess of lumbar spine M48.061 724.02   2. Muscle weakness (generalized) M62.81 728.87   3. Functional gait abnormality R26.89 781.2   4. History of lumbar laminectomy for spinal cord decompression Z98.890 V45.89       Patient Active Problem List   Diagnosis   • Hypertension   • Morbid obesity with BMI of 40.0-44.9, adult (CMS/HCC)   • Lumbar back pain with radiculopathy affecting right lower extremity   • Diabetes mellitus (CMS/HCC)   • Renal lesion   • Paroxysmal atrial fibrillation (CMS/HCC)   • Post-operative state   • Weakness of both lower extremities           PT Neuro     Row Name 19 0932             Subjective Comments    Subjective Comments  \"It is harder going up the steps.\" \"I really like theses shoes.\" (referring to her [employer] shoes)   -LB         Precautions and Contraindications    Precautions/Limitations  fall precautions  -LB      Precautions  Latex Allergy , spinal surgery 19 GREAT difficulty with the CURB on IE   -LB         Subjective Pain    Able to rate subjective pain?  yes  -LB      Pre-Treatment Pain Level  0  -LB      Post-Treatment Pain Level  0  -LB         Cognition    Overall Cognitive Status  WFL  -LB      Memory  Appears intact  -LB      Orientation Level  Oriented X4  -LB      Safety Judgment  Good awareness of safety precautions  -LB      Deficits  Fully aware of deficits  -LB         Posture/Observations    Alignment Options  Forward head  -LB      Forward Head  Mild  -LB      Observations  Edema minimal in bilateral ankles and feet  -LB         Transfers    Sit-Stand [place] (Transfers)  supervision from w/c   -LB      Stand-Sit [place] (Transfers)  supervision to w/c   -LB      Transfers, Sit-Stand-Sit, Assist " "Device  rolling walker  -LB      Transfer, Safety Issues  balance decreased during turns;sequencing ability decreased;weight-shifting ability decreased  -LB      Comment (Transfers)  CGA from armless chair  -LB         Gait/Stairs Assessment/Training    [place] Level (Gait)  contact guard  -LB      Assistive Device (Gait)  walker, front-wheeled;AFO  -LB      Distance in Feet (Gait)  70' x 2   -LB      Pattern (Gait)  step-through  -LB      Deviations/Abnormal Patterns (Gait)  gait speed decreased;stride length decreased  -LB      Bilateral Gait Deviations  forward flexed posture  -LB      Left Sided Gait Deviations  heel strike decreased;weight shift ability decreased does not fully shift lateral to the L LE   -LB      Right Sided Gait Deviations  weight shift ability decreased R LE minimally adducts at times   -LB      [place] Level (Stairs)  maximum assist (25% patient effort);2 person assist;verbal cues max from  & CGA(descending) to min (ascending from PT  -LB      Assistive Device (Stairs)  walker, front-wheeled  -LB      Handrail Location (Stairs)  right side (ascending) left side descending,   -LB      Number of Steps (Stairs)  3 (7\" steps)  -LB      Ascending Technique (Stairs)  step-to-step  -LB      Descending Technique (Stairs)  step-to-step  -LB      Stairs, Safety Issues  sequencing ability decreased  -LB      Stairs, Impairments  strength decreased;impaired balance  -LB      Comment (Gait/Stairs)  When pt ascending steps pt pulls strongly on the R rail. When instructed to push on the rail with her R hand pt was unable to per lift herself onto the step.   -LB         Balance Skills Training    Standing-Level of Assistance  Contact guard;Minimum assistance  -LB      Static Standing Balance Support  Right upper extremity supported;Left upper extremity supported;parallel bars  -LB      Standing-Balance Activities  Standing on 1/2 roll;Compliant surfaces 4\" foam w/ feet even and then one " "foot forward (w/o orthotic  -LB      Gait Balance-Level of Assistance  Contact guard;Minimum assistance  -LB      Gait Balance Support  parallel bars;Right upper extremity supported;Left upper extremity supported  -LB      Gait Balance Activities  side-stepping;backwards without orthotic, cues to shift fully to the L   -LB      Balance Comments  gastroc stretch on incline  -LB        User Key  (r) = Recorded By, (t) = Taken By, (c) = Cosigned By    Initials Name Provider Type    Nalini Becker, PT Physical Therapist                  PT Assessment/Plan     Row Name 12/13/19 1140          PT Assessment    Assessment Comments  Pt's  assisted her on the steps with one rail with  providing maximal HHA and PT CGA to minimal of 1. Pt had more difficulty aascending the steps and had to pull strongly on the rail. Pt and her  are not ready to perform stairs at home and were advised to wait.  -LB       User Key  (r) = Recorded By, (t) = Taken By, (c) = Cosigned By    Initials Name Provider Type    Nalini Becker, PT Physical Therapist             OP Exercises     Row Name 12/13/19 0903             Subjective Comments    Subjective Comments  \"It is harder going up the steps.\" \"I really like theses shoes.\" (referring to her [employer] shoes)   -LB         Subjective Pain    Able to rate subjective pain?  yes  -LB      Pre-Treatment Pain Level  0  -LB      Post-Treatment Pain Level  0  -LB         Exercise 1    Exercise Name 1  gastroc stretch on incline at end of ll bars with B UE support wearing her shoes w/o orthotic or AFO performed before balance activities  -LB      Cueing 1  Verbal;Tactile  -LB      Time 1  90  -LB         Exercise 3    Exercise Name 3  AROM / RROM  bilateral ankles in all planes   -LB      Cueing 3  Verbal;Tactile  -LB      Reps 3  10  -LB         Exercise 4    Exercise Name 4  bilateral plantarflexion while standing on the incline at the end of the ll bars with strong pressure " thru UE's   -LB      Cueing 4  Verbal;Tactile  -LB      Reps 4  7  -LB      Additional Comments  Pt only able to lift ~ 1/4 range   -LB        User Key  (r) = Recorded By, (t) = Taken By, (c) = Cosigned By    Initials Name Provider Type    Nalini Becker, PT Physical Therapist                          Therapy Education  Education Details: Steps with pt and  providing maximal assistance and CGA to minimal of PT. PT strongly recommended pt and  not attempted stairs at home as pt struggled with ascending the steps.   Given: Mobility training  How Provided: Verbal, Demonstration  Provided to: Patient, Caregiver  Level of Understanding: Verbalized, Demonstrated, Teach back education performed              Time Calculation:   Start Time: 0932  Stop Time: 1017  Time Calculation (min): 45 min  Total Timed Code Minutes- PT: 45 minute(s)   Therapy Charges for Today     Code Description Service Date Service Provider Modifiers Qty    20525183464 HC PT NEUROMUSC RE EDUCATION EA 15 MIN 12/13/2019 Nalini Mccall, PT GP 3                    Nalini Mccall, PT  12/13/2019

## 2019-12-16 ENCOUNTER — HOSPITAL ENCOUNTER (OUTPATIENT)
Dept: PHYSICAL THERAPY | Facility: HOSPITAL | Age: 69
Setting detail: THERAPIES SERIES
Discharge: HOME | End: 2019-12-16

## 2019-12-16 ENCOUNTER — HOSPITAL ENCOUNTER (OUTPATIENT)
Dept: OCCUPATIONAL THERAPY | Facility: HOSPITAL | Age: 69
Setting detail: THERAPIES SERIES
Discharge: HOME | End: 2019-12-16

## 2019-12-16 DIAGNOSIS — Z98.890 HISTORY OF LUMBAR LAMINECTOMY FOR SPINAL CORD DECOMPRESSION: ICD-10-CM

## 2019-12-16 DIAGNOSIS — R53.1 GENERALIZED WEAKNESS: ICD-10-CM

## 2019-12-16 DIAGNOSIS — M48.061 SPINAL STENOSIS OF LUMBAR REGION, UNSPECIFIED WHETHER NEUROGENIC CLAUDICATION PRESENT: Primary | ICD-10-CM

## 2019-12-16 DIAGNOSIS — M62.81 MUSCLE WEAKNESS (GENERALIZED): ICD-10-CM

## 2019-12-16 DIAGNOSIS — R26.89 FUNCTIONAL GAIT ABNORMALITY: ICD-10-CM

## 2019-12-16 DIAGNOSIS — M48.061 STENOSIS OF LATERAL RECESS OF LUMBAR SPINE: Primary | ICD-10-CM

## 2019-12-16 PROCEDURE — 97110 THERAPEUTIC EXERCISES: CPT

## 2019-12-16 PROCEDURE — 97112 NEUROMUSCULAR REEDUCATION: CPT

## 2019-12-16 NOTE — THERAPY PROGRESS REPORT/RE-CERT
"    Outpatient Physical Therapy Neuro Progress Note  [facility]     Patient Name: Baltazar MELO  : 1950  MRN: [id number]  Today's Date: 2019      Visit Date: 2019    Visit Dx:    ICD-10-CM ICD-9-CM   1. Stenosis of lateral recess of lumbar spine M48.061 724.02   2. Muscle weakness (generalized) M62.81 728.87   3. Functional gait abnormality R26.89 781.2   4. History of lumbar laminectomy for spinal cord decompression Z98.890 V45.89       Patient Active Problem List   Diagnosis   • Hypertension   • Morbid obesity with BMI of 40.0-44.9, adult (CMS/HCC)   • Lumbar back pain with radiculopathy affecting right lower extremity   • Diabetes mellitus (CMS/HCC)   • Renal lesion   • Paroxysmal atrial fibrillation (CMS/HCC)   • Post-operative state   • Weakness of both lower extremities           PT Neuro     Row Name 19 1200             Subjective Comments    Subjective Comments  \"I don't know if I will ever get used to this brace.\" \"I have been wearing it more.\" \"When I walked barefoot I didn't feel as stable.\"   -LB         Precautions and Contraindications    Precautions/Limitations  fall precautions  -LB      Precautions  Latex Allergy , spinal surgery 19 GREAT difficulty with the CURB on IE   -LB         Subjective Pain    Able to rate subjective pain?  yes  -LB      Pre-Treatment Pain Level  0  -LB      Post-Treatment Pain Level  0  -LB         Cognition    Overall Cognitive Status  WFL  -LB      Memory  Appears intact  -LB      Orientation Level  Oriented X4  -LB      Safety Judgment  Good awareness of safety precautions  -LB      Deficits  Fully aware of deficits  -LB         MMT Right Lower Ext    Rt Hip Flexion MMT, Gross Movement  (4/5) good  -LB      Rt Hip ABduction MMT, Gross Movement  (3-/5) fair minus  -LB      Rt Knee Extension MMT, Gross Movement  (5/5) normal  -LB      Rt Knee Flexion MMT, Gross Movement  -- moderate resistance in sitting   -LB      Rt Ankle Dorsiflexion " MMT, Gross Movement  (4/5) good  -LB      Rt Ankle Subtalar Inversion MT, Gross Movement  (4/5) good  -LB      Rt Ankle Subtalar Eversion MMT, Gross Movement  (3+/5) fair plus  -LB      Rt MTP Flexion MMT, Gross Movement  (1/5) trace  -LB      Rt MTP Extension MMT, Gross Movement  (1/5) trace  -LB         MMT Left Lower Ext    Lt Hip Flexion MMT, Gross Movement  (4/5) good  -LB      Lt Hip ABduction MMT, Gross Movement  (3-/5) fair minus  -LB      Lt Knee Extension MMT, Gross Movement  (5/5) normal  -LB      Lt Knee Flexion MMT, Gross Movement  -- moderate resistance in sitting   -LB      Lt Ankle Plantarflexion MMT, Gross Movement  other (see comments) minimal resistance in sitting   -LB      Lt Ankle Dorsiflexion MMT, Gross Movement  other (see comments)  -LB      Lt Ankle Subtalar Inversion MMT, Gross Movement  (4/5) good  -LB      Lt Ankle Subtalar Eversion MMT, Gross Movement  (3+/5) fair plus  -LB      Lt MTP Flexion MMT, Gross Movement  (1/5) trace  -LB      Lt MTP Extension MMT, Gross Movement  (1/5) trace  -LB         Bed Mobility Assessment/Treatment    Rolling Left [place] (Bed Mobility)  independent  -LB      Rolling Right [place] (Bed Mobility)  independent  -LB      Supine-Sit [place] (Bed Mobility)  independent  -LB      Sit-Supine [place] (Bed Mobility)  independent  -LB         Transfers    Sit-Stand [place] (Transfers)  stand by assist from armless chair to walker (both hands on chair seat)   -LB      Stand-Sit [place] (Transfers)  stand by assist to armless chair to walker (both hands on chair seat)  -LB      Transfers, Sit-Stand-Sit, Assist Device  rolling walker  -LB      Transfer, Safety Issues  balance decreased during turns;sequencing ability decreased;weight-shifting ability decreased  -LB      Comment (Transfers)  Pt came to stand conditional independent from her w/c and an armed wade to her walker with bilateral UE's on the chair armrests.   -LB          "Gait/Stairs Assessment/Training    Jenkins Level (Gait)  stand by assist  -LB      Assistive Device (Gait)  walker, front-wheeled;AFO  -LB      Distance in Feet (Gait)  80' x 2 with AFO in place, 60' without AFO   -LB      Pattern (Gait)  step-through  -LB      Deviations/Abnormal Patterns (Gait)  gait speed decreased;stride length decreased  -LB      Bilateral Gait Deviations  forward flexed posture  -LB      Left Sided Gait Deviations  heel strike decreased;weight shift ability decreased  -LB      Right Sided Gait Deviations  weight shift ability decreased  -LB      Negotiation (Stairs)  other (see comments) Pt's steps at home are 8\" in ht, handrail ht 32\" to 35\"   -LB      Jenkins Level (Stairs)  contact guard;minimum assist (75% patient effort);verbal cues  -LB      Assistive Device (Stairs)  walker, front-wheeled  -LB      Number of Steps (Stairs)  curb   -LB      Stairs, Safety Issues  sequencing ability decreased;weight-shifting ability decreased  -LB      Stairs, Impairments  strength decreased;impaired balance  -LB      Comment (Gait/Stairs)  see Yunieletti and TUG stairs not assessed today  -LB         Balance Skills Training    Balance Comments  see Tinetti   -LB         Orthotic/Prosthetic Management    Orthosis Location  AFO (ankle foot orthosis)  -LB         Ankle Foot Orthosis Management    Type (Ankle/Foot Orthosis)  -- posterior lateral strut AFO (Thu[place]  -LB      Wearing Schedule (Ankle Foot Orthosis)  wear with activity/work  -LB      Orthosis Training (Ankle Foot Orthosis)  patient and caregiver  -LB        User Key  (r) = Recorded By, (t) = Taken By, (c) = Cosigned By    Initials Name Provider Type    LB Nalini Mccall, PT Physical Therapist                  PT Assessment/Plan     Row Name 12/16/19 1687          PT Assessment    Assessment Comments  Pt demonstrating improvement with transfers, gait and balance as indicated by the Tinetti. Pt's strength in her ankles and hip abductors has " "improved minimally. Pt was evaluated with ascending and descending 3 steps. Pt had great difficulty ascending the steps. Pt has imporved with ascending and descending a curb. Pt would greatly beenfit from continued PT.   -LB        PT Plan    PT Frequency  2x/week  -LB     Predicted Duration of Therapy Intervention (Therapy Eval)  12  -LB       User Key  (r) = Recorded By, (t) = Taken By, (c) = Cosigned By    Initials Name Provider Type    Nalini Becker PT Physical Therapist             OP Exercises     Row Name 12/16/19 1200             Subjective Comments    Subjective Comments  \"I don't know if I will ever get used to this brace.\" \"I have been wearing it more.\" \"When I walked barefoot I didn't feel as stable.\"   -LB         Subjective Pain    Able to rate subjective pain?  yes  -LB      Pre-Treatment Pain Level  0  -LB      Post-Treatment Pain Level  0  -LB        User Key  (r) = Recorded By, (t) = Taken By, (c) = Cosigned By    Initials Name Provider Type    Nalini Becker PT Physical Therapist                      PT OP Goals     Row Name 12/16/19 1200          PT Short Term Goals    STG Date to Achieve  01/16/20  -LB     STG 1  Pt to come to stand from armless chair to rolling walker with both hands on the chair seat.   -LB     STG 1 Progress  Met  -LB     STG 2  Pt to increase strength in her bilateral hip abductors to 3+/5.  -LB     STG 2 Progress  Progressing;Ongoing  -LB     STG 3  Pt to increase strength ankle musculature by 1/2 grade overall.   -LB     STG 3 Progress  Progressing;Ongoing  -LB     STG 4  Pt to ascend and descend a curb with rolling walker with CGA to minimal of 1.   -LB     STG 4 Progress  Met  -LB     STG 5  Pt to ambulate with ~ 80' x 3 in one session with rolling walker.   -LB     STG 5 Progress  Progressing;Ongoing  -LB     STG 5 Progress Comments  Pt can ambulate 80' x 2 with SBA.   -LB     STG 6  Pt to improve score to 18/28 on the Tinetti to improve balance and reduce risk " of falls.   -LB     STG 6 Progress  Met  -LB     STG 6 Progress Comments  Pt scored a 20/28.   -LB     STG 7  Pt to be evaluated on stairs.   -LB     STG 7 Progress  Met  -LB     STG 7 Progress Comments  Pt requires maximal of 2 on the steps with one rail.  -LB     STG 8  Pt to perform 3 steps with one rail with moderate of PT.  -LB     STG 8 Progress  New  -LB        Long Term Goals    LTG Date to Achieve  02/10/20  -LB     LTG 1  Pt to increase strength in her bilateral hip abductors to 4/5.  -LB     LTG 1 Progress  Progressing;Ongoing  -LB     LTG 2  Pt to increase strength ankle musculature by 1 grade overall  -LB     LTG 2 Progress  Progressing;Ongoing  -LB     LTG 3  Pt to improve score to 21/28 on the Tinetti to improve balance and reduce risk of falls.   -LB     LTG 3 Progress  Progressing;Ongoing  -LB     LTG 4  Pt to ambulate ~ 200' x 3 conditional independent with least restrictive device  -LB     LTG 4 Progress  Progressing;Ongoing  -LB     LTG 5  Pt to perform knee flexion in sitting with maximal resistance.   -LB     LTG 5 Progress  Progressing;Ongoing  -LB     LTG 6  Pt to ambulate with increased wt shift forward and increased pushoff   -LB     LTG 6 Progress  Progressing;Ongoing  -LB     LTG 7  Pt to ascend and descend a curb conditional independent with least restrictive device.   -LB     LTG 7 Progress  Progressing;Ongoing  -LB     LTG 8  Pt to ambulate with increased step length.  -LB     LTG 9  Pt to complete the TUG in < 14 seconds with least restricitve device.   -LB     LTG 9 Progress  New  -LB        Time Calculation    PT Goal Re-Cert Due Date  01/16/20  -LB       User Key  (r) = Recorded By, (t) = Taken By, (c) = Cosigned By    Initials Name Provider Type    Nalini Becker, PT Physical Therapist          Therapy Education  Education Details: Advised pt to continue wearing her AFO with ambulation. Discussed her impprovement with the outcome measure the Tinetti.   Given: Fall prevention  "and home safety, Mobility training, Other (comment)  How Provided: Verbal  Provided to: Patient  Level of Understanding: Verbalized    Outcome Measure Options: Tinetti, Timed Up and Go (TUG)  Tinetti Assessment  Tinetti Assessment: yes  Sitting Balance: Steady,safe  Arises: Able, uses arms  Attempts to Rise: Able in 1 attempt  Immediate Standing Balance (first 5 sec): Steady without support  Standing Balance: Narrow stance, without support  Sternal Nudge (feet close together): Steady  Eyes Closed (feet close together): Steady  Turning 360 Degrees- Steps: Discontinuous steps  Turning 360 Degrees- Steadiness: Unsteady (staggers, grabs)  Sitting Down: Uses arms or not a smooth motion  Tinetti Balance Score: 12  Gait Initiation (immediate after told \"go\"): No hesitancy  Step Length- Right Swing: Right swing foot passes Left stance leg  Step Length- Left Swing: Left swing foot passes Right  Foot Clearance- Right Foot: Right foot completely clears floor  Foot Clearance- Left Foot: Left foot completely clears floor  Step Symmetry: Right and Left step length unequal  Step Continuity: Steps appear continuous  Path (excursion): Mild/moderate deviation or use of aid  Trunk: Marked sway or uses device  Base of Support: Heels close while walking  Gait Score: 8  Tinetti Total Score: 20  Tinetti Assistive Device: rolling walker  Tinetti Assessment Comments: with SBA and L AFO   Timed Up and Go (TUG)  TUG Test 1: 21 seconds  Timed Up and Go Comments: rolling walker and AFO       Time Calculation:   Start Time: 1105  Stop Time: 1152  Time Calculation (min): 47 min  Total Timed Code Minutes- PT: 47 minute(s)   Therapy Charges for Today     Code Description Service Date Service Provider Modifiers Qty    92985468471 HC PT NEUROMUSC RE EDUCATION EA 15 MIN 12/16/2019 Nalini Mccall, PT GP 3          PT G-Codes  Outcome Measure Options: Tinetti, Timed Up and Go (TUG)  Tinetti Total Score: 20  TUG Test 1: 21 seconds         Nalini Mccall, " PT  12/16/2019

## 2019-12-16 NOTE — THERAPY TREATMENT NOTE
Outpatient Occupational Therapy Neuro Treatment Note  [facility]     Patient Name: Baltazar MELO  : 1950  MRN: [id number]  Today's Date: 2019       Visit Date: 2019    Patient Active Problem List   Diagnosis   • Hypertension   • Morbid obesity with BMI of 40.0-44.9, adult (CMS/[facility])   • Lumbar back pain with radiculopathy affecting right lower extremity   • Diabetes mellitus (CMS/[facility])   • Renal lesion   • Paroxysmal atrial fibrillation (CMS/HCC)   • Post-operative state   • Weakness of both lower extremities        Past Medical History:   Diagnosis Date   • A-fib (CMS/HCC)    • Arthritis    • Creatinine elevation    • Diabetes mellitus (CMS/[facility])    • Elevated cholesterol    • Health care maintenance    • Hyperlipidemia    • Hypertension    • Obesity    • PVC (premature ventricular contraction)         Past Surgical History:   Procedure Laterality Date   • BACK SURGERY     • BREAST CYST EXCISION Left    • BREAST SURGERY Bilateral     Reduction   • CHOLECYSTECTOMY     • HYSTERECTOMY     • LUMBAR DISCECTOMY Left 2019    Procedure: Left Lumbar Three to Lumbar Four and Lumbar Four to Lumbar Five Decompression;  Surgeon: Abhijit Robertson MD;  Location: [facility];  Service: Neurosurgery         Visit Dx:    ICD-10-CM ICD-9-CM   1. Spinal stenosis of lumbar region, unspecified whether neurogenic claudication present M48.061 724.02   2. Generalized weakness R53.1 780.79                   OT Assessment/Plan     Row Name 19 1000          OT Assessment    Assessment Comments  Pt reports increased participation with functional activities, was able to go shopping and pick out/purchase clothes from wheel chair level.  -MR       User Key  (r) = Recorded By, (t) = Taken By, (c) = Cosigned By    Initials Name Provider Type    Barbara Akers, OT Occupational Therapist                     Therapy Education  Given: HEP  Program: Reinforced  How Provided: Verbal, Demonstration  Provided  "to: Patient  Level of Understanding: Teach back education performed, Verbalized, Demonstrated      OT Exercises     Row Name 12/16/19 1000             Subjective Comments    Subjective Comments  \"I've been busy this week.\"  -MR         Subjective Pain    Able to rate subjective pain?  yes  -MR      Pre-Treatment Pain Level  0  -MR      Post-Treatment Pain Level  0  -MR         Exercise 1    Exercise Name 1  UE strengthening and endurance. Using hand weights pt performs forward press, ab/adduction, shoulder flexion, elbow flexion/extension, pronation/supination,   -MR      Cueing 1  Verbal;Demo  -MR      Equipment 1  Dumbell  -MR      Weights/Plates 1  2  -MR      Sets 1  3  -MR      Reps 1  10  -MR         Exercise 2    Exercise Name 2  Scapula exercises. Using theraband for resistance pt performs scapula retraction and shoulder extension.  -MR      Cueing 2  Verbal;Demo  -MR      Equipment 2  Theraband  -MR      Resistance 2  Red  -MR      Sets 2  3  -MR      Reps 2  10  -MR         Exercise 3    Exercise Name 3  UE eromger. Pt propels UE ergometer at Level 2 resistance.  -MR      Cueing 3  Verbal;Demo  -MR      Equipment 3  UE Ergometer  -MR        User Key  (r) = Recorded By, (t) = Taken By, (c) = Cosigned By    Initials Name Provider Type    Barbara Akers OT Occupational Therapist                      Time Calculation:   OT Start Time: 1017  OT Stop Time: 1059  OT Time Calculation (min): 42 min  Total Timed Code Minutes- OT: 42 minute(s)     Therapy Charges for Today     Code Description Service Date Service Provider Modifiers Qty    54290013886 HC OT THER PROC EA 15 MIN 12/16/2019 Barbara Nunes OT GO 3                    Barbara Nunes OT  12/16/2019  "

## 2019-12-16 NOTE — THERAPY TREATMENT NOTE
"    Outpatient Physical Therapy Neuro Treatment Note  [facility]     Patient Name: Baltazar MELO  : 1950  MRN: [id number]  Today's Date: 2019      Visit Date: 2019    Visit Dx:    ICD-10-CM ICD-9-CM   1. Stenosis of lateral recess of lumbar spine M48.061 724.02   2. Muscle weakness (generalized) M62.81 728.87   3. Functional gait abnormality R26.89 781.2   4. History of lumbar laminectomy for spinal cord decompression Z98.890 V45.89       Patient Active Problem List   Diagnosis   • Hypertension   • Morbid obesity with BMI of 40.0-44.9, adult (CMS/HCC)   • Lumbar back pain with radiculopathy affecting right lower extremity   • Diabetes mellitus (CMS/HCC)   • Renal lesion   • Paroxysmal atrial fibrillation (CMS/HCC)   • Post-operative state   • Weakness of both lower extremities           PT Neuro     Row Name 19 1105             Subjective Comments    Subjective Comments  \"I saw Nalini from the surgeon's office and she said I was doing well and was released.   -LB         Precautions and Contraindications    Precautions/Limitations  fall precautions  -LB      Precautions  Latex Allergy , spinal surgery 19 GREAT difficulty with the CURB on IE   -LB         Subjective Pain    Able to rate subjective pain?  yes  -LB      Pre-Treatment Pain Level  2  -LB      Post-Treatment Pain Level  2  -LB         Cognition    Overall Cognitive Status  WFL  -LB      Memory  Appears intact  -LB      Orientation Level  Oriented X4  -LB      Safety Judgment  Good awareness of safety precautions  -LB      Deficits  Fully aware of deficits  -LB         Posture/Observations    Alignment Options  Forward head  -LB      Forward Head  Mild  -LB      Observations  Edema minimal in bilateral ankles/legs   -LB         Bed Mobility Assessment/Treatment    Comment (Bed Mobility)  not performed   -LB         Transfers    Sit-Stand Gillespie (Transfers)  contact guard;verbal cues from an armless chair   -LB      " Subjective:       Patient ID: Susu Luther is a 69 y.o. female.    Chief Complaint: Malignant neoplasm of upper-outer quadrant of right breast in female, estrogen receptor positive [C50.411, Z17.0]  HPI: We have an opportunity to see Ms. Susu Luther in Hematology Oncology clinic at [facility] on 12/16/2019.  Ms. Susu Luther is a 69 y.o. woman with pT2N0 invasive lobular breast cancer ER positive ND negative Her2 negative s/p lumpectomy and sentinel node biopsy.       Was found right breast mass on self breast exam.  Screening mammogram on 2/26/2019 showed Impression:  Right  Mass: Right breast mass at the lower outer middle position. Assessment: 0 - Incomplete. Special Views: Spot Compression View and Ultrasound are recommended.      Left  There is no mammographic evidence of malignancy.     On March 4, 2019, underwent US guided biopsy showed FINAL PATHOLOGIC DIAGNOSIS  1. BREAST, RIGHT, LOWER OUTER 08:00, ULTRASOUND-GUIDED BIOPSY:  Invasive lobular carcinoma of breast.  Size of invasive carcinoma: 19 mm in greatest linear dimension within a single core biopsy fragment.  [place] Histologic Score: Grade 2 of 3.  Tubule formation: 3  Nuclear pleomorphism: 2  Mitoses: 1  Associated lobular carcinoma in situ (LCIS) is present.  No lymphovascular or perineural invasion.  Breast biomarkers are pending and will be included in the supplemental report.  2. AXILLA, RIGHT LYMPH NODES, ULTRASOUND-GUIDED BIOPSY:  Benign lymph node without evidence of metastatic carcinoma.  Immunohistochemical stains (Cam 5.2 and CK7) are confirmatory.     On March 27, 2019 underwent lumpectomy with sentinel node.  Pathology showed   PROCEDURE: Excision/lumpectomy  SPECIMEN LATERALITY: Right breast  TUMOR SITE: 8 o'clock  TUMOR SIZE: Approximately 4.0 x 3.0 x 3.0 cm  HISTOLOGIC TYPE: Invasive lobular carcinoma  HISTOLOGIC GRADE: Grade 2 of 3  Tubular differentiation: 3  Nuclear pleomorphism: 2  Mitotic rate:  1  TUMOR FOCALITY: Single focus of invasive carcinoma  DUCTAL CARCINOMA IN SITU: Not identified  LOBULAR CARCINOMA IN SITU: Present  MARGINS:  Main specimen (#6) : Carcinoma present at superior, deep and medial margins  Distance from inferior margin: 5 mm  Distance from lateral margin: greater than 20 mm  Distance of anterior margin: 15 mm  Additionally submitted margins (specimen #7): Distance from newly designated margin: 4 mm  REGIONAL LYMPH NODES: Uninvolved tumor cells  Number of total lymph nodes examined: 8  Number of sentinel nodes examined: 4  TREATMENT EFFECT: No known presurgical therapy  LYMPH-VASCULAR INVASION: Not identified  ANCILLARY STUDIES (performed on patient's previous biopsy MS ):  ER: Positive (95% of tumor cells)  UT: Negative (less than 1 % of tumor cells)  Her2 by FISH: Negative (not amplified)  Ki-67%: Positive (70 % of tumor cells)  ADDITIONAL FINDINGS: Apocrine metaplasia, usual ductal hyperplasia, fibroadenomatoid change  PATHOLOGIC STAGE CLASSIFICATION: pT2 pN0     Oncotype type DX recurrence score 22, with distant recurrence risk 8%, absolute chemotherapy benefit <1%.     Completed adjuvant radiation.  Has right breast tenderness.  Started on anastrozole.  Has arthralgias. Could not tolerate.  AI was changed to letrozole, tolerating well.                    Has hot flashes and depression.  Cymbalta was started but had nausea diarrhea, could not tolerate. Currently on Effexor.  Hot flashes improved, but still uncomfortable.         Malignant neoplasm of upper-outer quadrant of right breast in female, estrogen receptor positive    3/14/2019 Initial Diagnosis     Malignant neoplasm of upper-outer quadrant of right breast in female, estrogen receptor positive      3/27/2019 Cancer Staged     Staging form: Breast, AJCC 8th Edition  - Pathologic stage from 3/27/2019: Stage IIA (pT2, pN0(sn), cM0, G2, ER+, UT-, HER2-) - Signed by Guido Huynh MD on 4/4/2019 5/20/2019 - 6/18/2019  Stand-Sit Centre (Transfers)  contact guard  -LB      Transfers, Sit-Stand-Sit, Assist Device  rolling walker  -LB      Transfer, Safety Issues  balance decreased during turns;sequencing ability decreased;weight-shifting ability decreased  -LB      Comment (Transfers)  Pt was able to come to stand with both hands on the armelss chair to the walker.   -LB         Gait/Stairs Assessment/Training    Centre Level (Gait)  contact guard;minimum assist (75% patient effort)  -LB      Assistive Device (Gait)  walker, front-wheeled;AFO  -LB      Distance in Feet (Gait)  80'  -LB      Pattern (Gait)  step-to;step-through  -LB      Deviations/Abnormal Patterns (Gait)  gait speed decreased;stride length decreased  -LB      Bilateral Gait Deviations  forward flexed posture  strong ER R > L, no pushoff  -LB      Left Sided Gait Deviations  heel strike decreased  -LB      Right Sided Gait Deviations  weight shift ability decreased;heel strike decreased  -LB      Centre Level (Stairs)  not tested  -LB         Balance Skills Training    Standing-Level of Assistance  Contact guard;Minimum assistance  -LB      Static Standing Balance Support  Right upper extremity supported;Left upper extremity supported;parallel bars  -LB      Standing-Balance Activities  Standing on 1/2 roll  -LB      Gait Balance-Level of Assistance  Contact guard;Minimum assistance min. tactile cues to bilateral gluts, verbal cues to go slow  -LB      Gait Balance Support  parallel bars  -LB      Gait Balance Activities  side-stepping  -LB      Balance Comments  gastroc stretch on incline at end of ll bars with B UE support wearing her shoes w/o orthotic or AFO performed before balance activities   -LB         Orthotic/Prosthetic Management    Orthosis Location  AFO (ankle foot orthosis)  -LB         Ankle Foot Orthosis Management    Type (Ankle/Foot Orthosis)  -- posterior lateral strut AFO ([place]  -LB      Wearing Schedule (Ankle Foot  "Orthosis)  wear with activity/work  -LB      Orthosis Training (Ankle Foot Orthosis)  patient and caregiver  -LB        User Key  (r) = Recorded By, (t) = Taken By, (c) = Cosigned By    Initials Name Provider Type    Nalini Becker, PT Physical Therapist                  PT Assessment/Plan     Row Name 11/25/19 1302          PT Assessment    Assessment Comments  Pt demonstrating weakness in bilateral hip abductors noted with sidestepping and wt shifting activities.   -LB       User Key  (r) = Recorded By, (t) = Taken By, (c) = Cosigned By    Initials Name Provider Type    Nalini Becker, PT Physical Therapist             OP Exercises     Row Name 11/25/19 1105             Subjective Comments    Subjective Comments  \"I saw Nalini from the surgeon's office and she said I was doing well and was released.   -LB         Subjective Pain    Able to rate subjective pain?  yes  -LB      Pre-Treatment Pain Level  2  -LB      Post-Treatment Pain Level  2  -LB         Exercise 1    Exercise Name 1  gastroc stretch on incline at end of ll bars with B UE support wearing her shoes w/o orthotic or AFO performed before balance activities  -LB      Cueing 1  Verbal;Tactile  -LB      Reps 1  1  -LB      Time 1  60  -LB         Exercise 2    Exercise Name 2  Step-ups onto 4\" step within the ll bars with strong CGA   -LB      Cueing 2  Verbal;Tactile  -LB      Reps 2  10 each LE   -LB      Additional Comments  definite need to use UE's   -LB         Exercise 3    Exercise Name 3  AROM / RROM  bilateral ankles in all planes   -LB      Cueing 3  Verbal;Tactile  -LB      Reps 3  10  -LB         Exercise 4    Exercise Name 4  gastroc stretch over edge of 1/2 styrofoam roll while wearing shoes without AFO  -LB      Cueing 4  Verbal;Tactile  -LB      Time 4  60  -LB         Exercise 5    Exercise Name 5  wt shifting to each LE while raising opposite heel   -LB      Cueing 5  Verbal;Tactile  -LB      Reps 5  8  -LB      Additional " Radiation Therapy     Treatment Site Ref. ID Energy Dose/Fx (Gy) #Fx Dose Correction (Gy) Total Dose (Gy) Start Date End Date Elapsed Days   Boost Boost 18X 2.5 4 / 4 0 10 6/13/2019 6/18/2019 5   RtBreastAddMV Rt Breast 18X/6X 2.66 16 / 16 0 42.56 5/20/2019 6/12/2019 23            Past Medical History:   Diagnosis Date    Encounter for blood transfusion     Hypertension     Malignant neoplasm of upper-outer quadrant of right breast in female, estrogen receptor positive 3/14/2019    radiation    Stroke 2009    no residual defect     Family History   Problem Relation Age of Onset    Diabetes Maternal Grandmother     Diabetes Maternal Grandfather     Diabetes Mother     Breast cancer Neg Hx     Colon cancer Neg Hx     Ovarian cancer Neg Hx      Social History     Socioeconomic History    Marital status:      Spouse name: Not on file    Number of children: Not on file    Years of education: Not on file    Highest education level: Not on file   Occupational History    Not on file   Social Needs    Financial resource strain: Not on file    Food insecurity:     Worry: Not on file     Inability: Not on file    Transportation needs:     Medical: Not on file     Non-medical: Not on file   Tobacco Use    Smoking status: Never Smoker    Smokeless tobacco: Never Used   Substance and Sexual Activity    Alcohol use: No    Drug use: No    Sexual activity: Yes     Partners: Male     Birth control/protection: Post-menopausal   Lifestyle    Physical activity:     Days per week: Not on file     Minutes per session: Not on file    Stress: Not on file   Relationships    Social connections:     Talks on phone: Not on file     Gets together: Not on file     Attends Anabaptist service: Not on file     Active member of club or organization: Not on file     Attends meetings of clubs or organizations: Not on file     Relationship status: Not on file   Other Topics Concern    Not on file   Social History  Comments  minimal tactile cues to glut muscles for stability   -LB        User Key  (r) = Recorded By, (t) = Taken By, (c) = Cosigned By    Initials Name Provider Type    Nalini Becker, PT Physical Therapist                          Therapy Education  Education Details: Donning the AFO with each foot on the footrests of the w/c.   Given: Other (comment)  How Provided: Verbal  Provided to: Patient, Caregiver  Level of Understanding: Verbalized, Demonstrated              Time Calculation:   Start Time: 1105  Stop Time: 1151  Time Calculation (min): 46 min  Total Timed Code Minutes- PT: 46 minute(s)   Therapy Charges for Today     Code Description Service Date Service Provider Modifiers Qty    [id number] HC PT NEUROMUSC RE EDUCATION EA 15 MIN 11/25/2019 Nalini Mccall, PT GP 3                    Nalini Mccall, PT  11/25/2019      Narrative    Not on file     Past Surgical History:   Procedure Laterality Date    APPENDECTOMY      BREAST BIOPSY      2019    BREAST LUMPECTOMY      2019     SECTION      x 1    OOPHORECTOMY      right     SENTINEL LYMPH NODE BIOPSY Right 3/27/2019    Procedure: BIOPSY, LYMPH NODE, SENTINEL;  Surgeon: Artemio Starks MD;  Location: [facility];  Service: General;  Laterality: Right;     Current Outpatient Medications   Medication Sig Dispense Refill    albuterol (VENTOLIN HFA) 90 mcg/actuation inhaler Inhale 2 puffs into the lungs every 4 (four) hours as needed for Shortness of Breath. 18 g 11    aspirin (ECOTRIN) 81 MG EC tablet Take 1 tablet (81 mg total) by mouth once daily.  0    atorvastatin (LIPITOR) 40 MG tablet Take 1 tablet (40 mg total) by mouth once daily. 90 tablet 3    blood pressure kit med and lrg Kit Take blood pressure first thing in the morning. 1 each 0    butalbital-acetaminophen-caff -40 mg -40 mg Cap Take 1 tablet by mouth 3 (three) times daily. 30 capsule 0    cholecalciferol, vitamin D3, 50,000 unit capsule Take 50,000 Units by mouth once a week.   0    cloNIDine (CATAPRES) 0.1 MG tablet Take 1 tablet (0.1 mg total) by mouth 3 (three) times daily. 270 tablet 2    diazePAM (VALIUM) 10 MG Tab Take 1 tablet (10 mg total) by mouth 2 (two) times daily. 60 tablet 1    emollient combination no.63 (MIADERM) Lotn Apply 1 application topically 3 (three) times daily.      furosemide (LASIX) 20 MG tablet take 1/2 tablet by mouth a day. 90 tablet 0    hydroCHLOROthiazide (HYDRODIURIL) 25 MG tablet Take 1 tablet (25 mg total) by mouth once daily. 90 tablet 3    inhalation spacing device (COMPACT SPACE CHAMBER) Use as directed for inhalation. 1 Device 0    letrozole (FEMARA) 2.5 mg Tab Take 1 tablet (2.5 mg total) by mouth once daily. 30 tablet 11    lidocaine (LIDODERM) 5 % Place 1 patch onto the skin daily as needed. Remove & Discard patch within 12 hours or as  directed by MD 30 patch 0    lidocaine HCl-menthol 4-1 % PtMd Apply 1 patch topically daily as needed. 30 patch 6    losartan (COZAAR) 100 MG tablet Take 100 mg by mouth once daily.  3    morphine (MSIR) 15 MG tablet Take 1 tablet (15 mg total) by mouth every 6 (six) hours as needed for Pain. 40 tablet 0    naproxen (NAPROSYN) 500 MG tablet Take 1 tablet (500 mg total) by mouth 2 (two) times daily with meals. 60 tablet 0    NARCAN 4 mg/actuation Sayre CALL 911. ADMINISTER A SINGLE SPRAY INTRANASALLY INTO ONE NOSTRIL UPON SIGNS OF OPIOID OVERDOSE. MAY REPEAT AFTER 3 MINUTES IF NO RESPONSE.  0    OLANZapine (ZYPREXA) 10 MG tablet Take 10 mg by mouth nightly.      ondansetron (ZOFRAN-ODT) 8 MG TbDL Take 1 tablet (8 mg total) by mouth every 8 (eight) hours as needed (nausea). 60 tablet 1    prochlorperazine (COMPAZINE) 10 MG tablet Take 1 tablet (10 mg total) by mouth 4 (four) times daily as needed (nausea). 60 tablet 1    propranolol (INDERAL) 40 MG tablet Take 1 tablet (40 mg total) by mouth 2 (two) times daily. 60 tablet 11    traMADol (ULTRAM) 50 mg tablet Take 1 tablet (50 mg total) by mouth every 6 (six) hours as needed for Pain. 50 tablet 0    venlafaxine (EFFEXOR-XR) 75 MG 24 hr capsule Take 1 capsule (75 mg total) by mouth once daily. 90 capsule 2    gabapentin (NEURONTIN) 100 MG capsule Take 1 capsule (100 mg total) by mouth 3 (three) times daily. 90 capsule 2    sertraline (ZOLOFT) 50 MG tablet Take 1 tablet (50 mg total) by mouth once daily. (Patient not taking: Reported on 12/16/2019) 90 tablet 0     No current facility-administered medications for this visit.        Labs:  Lab Results   Component Value Date    WBC 7.27 12/16/2019    HGB 12.3 12/16/2019    HCT 39.4 12/16/2019    MCV 78 (L) 12/16/2019     12/16/2019     BMP  Lab Results   Component Value Date     12/16/2019    K 3.2 (L) 12/16/2019     12/16/2019    CO2 27 12/16/2019    BUN 13 12/16/2019    CREATININE 1.3  12/16/2019    CALCIUM 9.9 12/16/2019    ANIONGAP 12 12/16/2019    ESTGFRAFRICA 49 (A) 12/16/2019    EGFRNONAA 43 (A) 12/16/2019     Lab Results   Component Value Date    ALT 18 12/16/2019    AST 11 12/16/2019    ALKPHOS 103 12/16/2019    BILITOT 0.3 12/16/2019       No results found for: IRON, TIBC, FERRITIN, SATURATEDIRO  No results found for: [id number]  No results found for: FOLATE  Lab Results   Component Value Date    TSH 0.718 11/11/2019       I have reviewed the radiology reports and examined the scan/xray images.    Review of Systems   Constitutional: Negative.    HENT: Negative.    Eyes: Negative.    Respiratory: Negative.    Cardiovascular: Negative.    Gastrointestinal: Negative.    Endocrine: Negative.    Genitourinary: Negative.    Musculoskeletal: Negative.    Skin: Negative.    Allergic/Immunologic: Negative.    Neurological: Negative.    Hematological: Negative.    Psychiatric/Behavioral: Negative.      ECOG SCORE    0 - Fully active-able to carry on all pre-disease performance without restriction            Objective:     Vitals:    12/16/19 1053   BP: (!) 162/96   Pulse: 73   Resp: 18   Temp: 97.8 °F (36.6 °C)   Body mass index is 44.11 kg/m².  Physical Exam   Constitutional: She is oriented to person, place, and time. She appears well-developed and well-nourished.   HENT:   Head: Normocephalic and atraumatic.   Eyes: Conjunctivae and EOM are normal.   Neck: Normal range of motion. Neck supple.   Cardiovascular: Normal rate and regular rhythm.   Pulmonary/Chest: Effort normal and breath sounds normal.   Abdominal: Soft. Bowel sounds are normal.   Musculoskeletal: Normal range of motion.   Neurological: She is alert and oriented to person, place, and time.   Skin: Skin is warm and dry.   Psychiatric: She has a normal mood and affect. Her behavior is normal. Judgment and thought content normal.   Nursing note and vitals reviewed.        Assessment:      1. Malignant neoplasm of upper-outer quadrant  of right breast in female, estrogen receptor positive    2. Depression, unspecified depression type    3. Essential hypertension           Plan:     Malignant neoplasm of upper-outer quadrant of right breast in female, estrogen receptor positive  Continue on letrozole for 10 years total.  Increase effexor to 75 mg daily for hot flashes.  -     diazePAM (VALIUM) 10 MG Tab; Take 1 tablet (10 mg total) by mouth 2 (two) times daily.  Dispense: 60 tablet; Refill: 1  -     lidocaine (LIDODERM) 5 %; Place 1 patch onto the skin daily as needed. Remove & Discard patch within 12 hours or as directed by MD  Dispense: 30 patch; Refill: 0  -     CBC auto differential; Future; Expected date: 02/17/2020  -     Comprehensive metabolic panel; Future; Expected date: 02/17/2020    Depression, unspecified depression type  -     venlafaxine (EFFEXOR-XR) 75 MG 24 hr capsule; Take 1 capsule (75 mg total) by mouth once daily.  Dispense: 90 capsule; Refill: 2    Essential hypertension  -     propranolol (INDERAL) 40 MG tablet; Take 1 tablet (40 mg total) by mouth 2 (two) times daily.  Dispense: 60 tablet; Refill: 11  -     Ambulatory referral to Internal Medicine

## 2019-12-20 ENCOUNTER — HOSPITAL ENCOUNTER (OUTPATIENT)
Dept: OCCUPATIONAL THERAPY | Facility: HOSPITAL | Age: 69
Setting detail: THERAPIES SERIES
Discharge: HOME | End: 2019-12-20

## 2019-12-20 ENCOUNTER — HOSPITAL ENCOUNTER (OUTPATIENT)
Dept: PHYSICAL THERAPY | Facility: HOSPITAL | Age: 69
Setting detail: THERAPIES SERIES
Discharge: HOME | End: 2019-12-20

## 2019-12-20 DIAGNOSIS — M48.061 STENOSIS OF LATERAL RECESS OF LUMBAR SPINE: Primary | ICD-10-CM

## 2019-12-20 DIAGNOSIS — R26.89 FUNCTIONAL GAIT ABNORMALITY: ICD-10-CM

## 2019-12-20 DIAGNOSIS — M48.061 SPINAL STENOSIS OF LUMBAR REGION, UNSPECIFIED WHETHER NEUROGENIC CLAUDICATION PRESENT: Primary | ICD-10-CM

## 2019-12-20 DIAGNOSIS — R53.1 GENERALIZED WEAKNESS: ICD-10-CM

## 2019-12-20 DIAGNOSIS — M62.81 MUSCLE WEAKNESS (GENERALIZED): ICD-10-CM

## 2019-12-20 PROCEDURE — 97112 NEUROMUSCULAR REEDUCATION: CPT

## 2019-12-20 PROCEDURE — 97112 NEUROMUSCULAR REEDUCATION: CPT | Performed by: OCCUPATIONAL THERAPIST

## 2019-12-20 PROCEDURE — 97110 THERAPEUTIC EXERCISES: CPT | Performed by: OCCUPATIONAL THERAPIST

## 2019-12-20 NOTE — THERAPY TREATMENT NOTE
"    Outpatient Physical Therapy Neuro Treatment Note  [facility]     Patient Name: Baltazar MELO  : 1950  MRN: [id number]  Today's Date: 2019      Visit Date: 2019    Visit Dx:    ICD-10-CM ICD-9-CM   1. Stenosis of lateral recess of lumbar spine M48.061 724.02   2. Muscle weakness (generalized) M62.81 728.87   3. Functional gait abnormality R26.89 781.2       Patient Active Problem List   Diagnosis   • Hypertension   • Morbid obesity with BMI of 40.0-44.9, adult (CMS/HCC)   • Lumbar back pain with radiculopathy affecting right lower extremity   • Diabetes mellitus (CMS/HCC)   • Renal lesion   • Paroxysmal atrial fibrillation (CMS/[facility])   • Post-operative state   • Weakness of both lower extremities           PT Neuro     Row Name 19 0935             Subjective Comments    Subjective Comments  \"I like that machine.\" (referring to the NuStep). \"Walked a couple steps without my walker the other day without thinking.\"   -LB         Precautions and Contraindications    Precautions/Limitations  fall precautions  -LB      Precautions  Latex Allergy , spinal surgery 19 GREAT difficulty with the CURB on IE   -LB         Subjective Pain    Able to rate subjective pain?  yes  -LB      Pre-Treatment Pain Level  0  -LB      Post-Treatment Pain Level  0  -LB         Cognition    Overall Cognitive Status  WFL  -LB      Memory  Appears intact  -LB      Orientation Level  Oriented X4  -LB      Safety Judgment  Good awareness of safety precautions  -LB      Deficits  Fully aware of deficits  -LB         Posture/Observations    Alignment Options  Forward head  -LB      Forward Head  Mild  -LB      Observations  Edema minimal in bilateral ankles and feet  -LB         Bed Mobility Assessment/Treatment    Comment (Bed Mobility)  not performed   -LB         Transfers    Sit-Stand [place] (Transfers)  stand by assist from an armless chair  -LB      Stand-Sit [place] (Transfers)  stand by assist " "to an armless chair  -LB      Transfers, Sit-Stand-Sit, Assist Device  rolling walker  -LB      Transfer, Safety Issues  balance decreased during turns;sequencing ability decreased;weight-shifting ability decreased  -LB      Comment (Transfers)  Good weight shift over LE's with to stand.   -LB         Gait/Stairs Assessment/Training    [place] Level (Gait)  stand by assist  -LB      Assistive Device (Gait)  walker, front-wheeled;AFO  -LB      Distance in Feet (Gait)  100' with AFO, 40' without AFO  -LB      Pattern (Gait)  step-through  -LB      Deviations/Abnormal Patterns (Gait)  gait speed decreased;stride length decreased  -LB      Bilateral Gait Deviations  forward flexed posture  -LB      Left Sided Gait Deviations  heel strike decreased;weight shift ability decreased  -LB      Right Sided Gait Deviations  weight shift ability decreased  -LB      Negotiation (Stairs)  other (see comments) Pt's steps at home are 8\" in ht, handrail ht 32\" to 35\"   -LB      Stairs, Safety Issues  sequencing ability decreased  -LB      Stairs, Impairments  strength decreased;impaired balance  -LB         Balance Skills Training    Gait Balance-Level of Assistance  Contact guard;Minimum assistance  -LB      Gait Balance Support  parallel bars;Right upper extremity supported;Left upper extremity supported  -LB      Gait Balance Activities  side-stepping;backwards without orthotic, cues to shift fully to the L   -LB         Orthotic/Prosthetic Management    Orthosis Location  AFO (ankle foot orthosis)  -LB         Ankle Foot Orthosis Management    Wearing Schedule (Ankle Foot Orthosis)  wear with activity/work  -LB      Orthosis Training (Ankle Foot Orthosis)  patient and caregiver  -LB        User Key  (r) = Recorded By, (t) = Taken By, (c) = Cosigned By    Initials Name Provider Type    Nalini Becker, PT Physical Therapist                  PT Assessment/Plan     Row Name 12/20/19 8935          PT Assessment    Assessment " "Comments  Pt was able to transfer on and off the NuStep with CGA and perform LE only activity for > 3 minutes. Pt reporting she walked a couple steps without her walker without thinking. Pt was strongly advised not to ambulate without her walker due to instability.   -LB       User Key  (r) = Recorded By, (t) = Taken By, (c) = Cosigned By    Initials Name Provider Type    Nalini Becker PT Physical Therapist             OP Exercises     Row Name 12/20/19 0935             Subjective Comments    Subjective Comments  \"I like that machine.\" (referring to the NuStep). \"Walked a couple steps without my walker the other day without thinking.\"   -LB         Subjective Pain    Able to rate subjective pain?  yes  -LB      Pre-Treatment Pain Level  0  -LB      Post-Treatment Pain Level  0  -LB         Exercise 1    Exercise Name 1  gastroc stretch on incline at end of ll bars with B UE support wearing her shoes w/o orthotic or AFO performed before balance activities  -LB      Cueing 1  Verbal;Tactile  -LB      Reps 1  1  -LB      Time 1  90  -LB         Exercise 2    Exercise Name 2  Step-ups onto 4\" step within the ll bars with strong CGA   -LB      Cueing 2  Verbal;Tactile  -LB      Reps 2  12 each LE   -LB         Exercise 3    Exercise Name 3  AROM / RROM  bilateral ankles in all planes   -LB      Cueing 3  Verbal;Tactile  -LB      Reps 3  10  -LB         Exercise 4    Exercise Name 4  bilateral plantarflexion while standing on the incline at the end of the ll bars with strong pressure thru UE's   -LB      Cueing 4  Verbal;Tactile  -LB      Reps 4  10  -LB         Exercise 8    Exercise Name 8  NuStep LE only, seat at 10, resistance at 3   -LB      Cueing 8  Verbal;Tactile  -LB      Time 8  3:15  -LB        User Key  (r) = Recorded By, (t) = Taken By, (c) = Cosigned By    Initials Name Provider Type    Nalini Becker PT Physical Therapist                          Therapy Education  Education Details: STRONGLY " recommended pt avoid walking without her walker due to decreased stability and weakness.   Given: Fall prevention and home safety              Time Calculation:   Start Time: 0932  Stop Time: 1017  Time Calculation (min): 45 min  Total Timed Code Minutes- PT: 45 minute(s)   Therapy Charges for Today     Code Description Service Date Service Provider Modifiers Qty    26810640153 HC PT NEUROMUSC RE EDUCATION EA 15 MIN 12/20/2019 Nalini Mccall, PT GP 3                    Nalini Mccall, PT  12/20/2019

## 2019-12-20 NOTE — THERAPY TREATMENT NOTE
Outpatient Occupational Therapy Neuro Treatment Note  [facility]     Patient Name: Baltazar MELO  : 1950  MRN: [id number]  Today's Date: 2019       Visit Date: 2019    Patient Active Problem List   Diagnosis   • Hypertension   • Morbid obesity with BMI of 40.0-44.9, adult (CMS/[facility])   • Lumbar back pain with radiculopathy affecting right lower extremity   • Diabetes mellitus (CMS/[facility])   • Renal lesion   • Paroxysmal atrial fibrillation (CMS/HCC)   • Post-operative state   • Weakness of both lower extremities        Past Medical History:   Diagnosis Date   • A-fib (CMS/[facility])    • Arthritis    • Creatinine elevation    • Diabetes mellitus (CMS/[facility])    • Elevated cholesterol    • Health care maintenance    • Hyperlipidemia    • Hypertension    • Obesity    • PVC (premature ventricular contraction)         Past Surgical History:   Procedure Laterality Date   • BACK SURGERY     • BREAST CYST EXCISION Left    • BREAST SURGERY Bilateral     Reduction   • CHOLECYSTECTOMY     • HYSTERECTOMY     • LUMBAR DISCECTOMY Left 2019    Procedure: Left Lumbar Three to Lumbar Four and Lumbar Four to Lumbar Five Decompression;  Surgeon: Abhijit Robertson MD;  Location: [facility];  Service: Neurosurgery         Visit Dx:    ICD-10-CM ICD-9-CM   1. Spinal stenosis of lumbar region, unspecified whether neurogenic claudication present M48.061 724.02   2. Generalized weakness R53.1 780.79                   OT Assessment/Plan     Row Name 19 1256          OT Assessment    Assessment Comments  Pt showing continued improvement in UE strengthening. Pt able to perform in standing position this date.  -SG       User Key  (r) = Recorded By, (t) = Taken By, (c) = Cosigned By    Initials Name Provider Type    Kalee Bruce OTR Occupational Therapist                     Therapy Education  Education Details: Encouraged going to the gym for UE strengthening with 2# DB.  Given: HEP  Program: New  How  "Provided: Verbal  Provided to: Caregiver, Patient  Level of Understanding: Verbalized      OT Exercises     Row Name 12/20/19 1200             Precautions    Existing Precautions/Restrictions  fall  -SG         Subjective Comments    Subjective Comments  \"I want to go to the gym.\"  -SG         Subjective Pain    Able to rate subjective pain?  yes  -SG      Pre-Treatment Pain Level  0  -SG      Post-Treatment Pain Level  0  -SG         Exercise 1    Exercise Name 1  Pt performs arm bike seated 5 min x2 with 1 min performed standing.  -SG      Cueing 1  Verbal;Demo  -SG      Equipment 1  UE Ergometer  -SG         Exercise 2    Exercise Name 2  In standing position pt performs UE strengthing unilaterally: shoulder press, add/abd, protraction/retraction, elbow flex/ext. 1 small LOB posteriorly but pt able to steady herself.  -SG      Cueing 2  Verbal;Demo  -SG      Equipment 2  [place]  -SG      Weights/Plates 2  2  -SG      Sets 2  3  -SG      Reps 2  10  -SG        User Key  (r) = Recorded By, (t) = Taken By, (c) = Cosigned By    Initials Name Provider Type     Kalee Smith OTR Occupational Therapist                      Time Calculation:   OT Start Time: 0845  OT Stop Time: 0930  OT Time Calculation (min): 45 min     Therapy Charges for Today     Code Description Service Date Service Provider Modifiers Qty    18092654318  OT THER PROC EA 15 MIN 12/20/2019 Kalee Smith OTR GO 2    [id number]  OT NEUROMUSC RE EDUCATION EA 15 MIN 12/20/2019 Kalee Smith OTR GO 1                    BILLY Peoples  12/20/2019  "

## 2019-12-23 ENCOUNTER — HOSPITAL ENCOUNTER (OUTPATIENT)
Dept: PHYSICAL THERAPY | Facility: HOSPITAL | Age: 69
Setting detail: THERAPIES SERIES
Discharge: HOME | End: 2019-12-23

## 2019-12-23 ENCOUNTER — HOSPITAL ENCOUNTER (OUTPATIENT)
Dept: OCCUPATIONAL THERAPY | Facility: HOSPITAL | Age: 69
Setting detail: THERAPIES SERIES
Discharge: HOME | End: 2019-12-23

## 2019-12-23 DIAGNOSIS — M62.81 MUSCLE WEAKNESS (GENERALIZED): ICD-10-CM

## 2019-12-23 DIAGNOSIS — M48.061 STENOSIS OF LATERAL RECESS OF LUMBAR SPINE: Primary | ICD-10-CM

## 2019-12-23 DIAGNOSIS — M48.061 SPINAL STENOSIS OF LUMBAR REGION, UNSPECIFIED WHETHER NEUROGENIC CLAUDICATION PRESENT: Primary | ICD-10-CM

## 2019-12-23 DIAGNOSIS — R26.89 FUNCTIONAL GAIT ABNORMALITY: ICD-10-CM

## 2019-12-23 DIAGNOSIS — R53.1 GENERALIZED WEAKNESS: ICD-10-CM

## 2019-12-23 PROCEDURE — 97112 NEUROMUSCULAR REEDUCATION: CPT | Performed by: OCCUPATIONAL THERAPIST

## 2019-12-23 PROCEDURE — 97112 NEUROMUSCULAR REEDUCATION: CPT

## 2019-12-23 PROCEDURE — 97110 THERAPEUTIC EXERCISES: CPT | Performed by: OCCUPATIONAL THERAPIST

## 2019-12-23 NOTE — THERAPY DISCHARGE NOTE
Outpatient Occupational Therapy Neuro Initial Evaluation/Discharge  [facility]     Patient Name: Baltazar MELO  : 1950  MRN: [id number]  Today's Date: 2019      Visit Date: 2019    Patient Active Problem List   Diagnosis   • Hypertension   • Morbid obesity with BMI of 40.0-44.9, adult (CMS/HCC)   • Lumbar back pain with radiculopathy affecting right lower extremity   • Diabetes mellitus (CMS/HCC)   • Renal lesion   • Paroxysmal atrial fibrillation (CMS/HCC)   • Post-operative state   • Weakness of both lower extremities        Past Medical History:   Diagnosis Date   • A-fib (CMS/HCC)    • Arthritis    • Creatinine elevation    • Diabetes mellitus (CMS/HCC)    • Elevated cholesterol    • Health care maintenance    • Hyperlipidemia    • Hypertension    • Obesity    • PVC (premature ventricular contraction)         Past Surgical History:   Procedure Laterality Date   • BACK SURGERY     • BREAST CYST EXCISION Left    • BREAST SURGERY Bilateral     Reduction   • CHOLECYSTECTOMY     • HYSTERECTOMY     • LUMBAR DISCECTOMY Left 2019    Procedure: Left Lumbar Three to Lumbar Four and Lumbar Four to Lumbar Five Decompression;  Surgeon: Abhijit Robertson MD;  Location: [facility];  Service: Neurosurgery         Visit Dx:    ICD-10-CM ICD-9-CM   1. Spinal stenosis of lumbar region, unspecified whether neurogenic claudication present M48.061 724.02   2. Generalized weakness R53.1 780.79           OT Neuro     Row Name 19 1500             Cognitive Assessment/Intervention    Current Cognitive/Communication Assessment  functional  -SG      Orientation Status (Cognition)  oriented x 3  -SG      Follows Commands (Cognition)  WNL  -SG         General ROM    GENERAL ROM COMMENTS  BUE WFL  -SG         MMT (Manual Muscle Testing)    General MMT Comments  UE 4 to 4+/5  -SG         Functional Mobility    Functional Mobility- Comment  Pt able to perform functional mobility in the home at mod I  level using rwx. Pt able to walk to closet, retrieve clothing items, use walker to hold towels, etc.  -SG         Bathing Assessment/Intervention    Bathing [place] Level  conditional independence  -SG         Lower Body Bathing Assessment/Training    LB Bathing Assess/Train, [place] Level  conditional independence  -SG         Upper Body Dressing Assessment/Training    Upper Body Dressing [place] Level  conditional independence  -SG         Lower Body Dressing Assessment/Training    Lower Body Dressing [place] Level  conditional independence  -SG         Toileting Assessment/Training    [place] Level (Toileting)  conditional independence  -SG         Grooming Assessment/Training    [place] Level (Grooming)  conditional independence  -SG        User Key  (r) = Recorded By, (t) = Taken By, (c) = Cosigned By    Initials Name Provider Type    Kalee Bruce OTR Occupational Therapist             Hand Therapy (last 24 hours)      Hand Eval     Row Name 12/23/19 1000              Strength Right    # Reps  3  -SG      Right Rung  2  -SG      Right  Test 1  49  -SG      Right  Test 2  53  -SG      Right  Test 3  45  -SG       Strength Average Right  49  -SG          Strength Left    # Reps  3  -SG      Left Rung  2  -SG      Left  Test 1  35  -SG      Left  Test 2  36  -SG      Left  Test 3  35  -SG       Strength Average Left  35.33  -SG         Right Hand Strength - Pinch (lbs)    Lateral  13 lbs  -SG      Tip (2 point)  11 lbs  -SG         Left Hand Strength - Pinch (lbs)    Lateral  12 lbs  -SG      Tip (2 point)  8 lbs  -SG        User Key  (r) = Recorded By, (t) = Taken By, (c) = Cosigned By    Initials Name Provider Type    Kalee Bruce OTR Occupational Therapist              Therapy Education  Education Details: Continue UE strengthing at home. Yellow theraputty HEP.  Given: HEP  Program: New, Reinforced  How Provided:  Verbal  Provided to: Patient  Level of Understanding: Verbalized    OT Goals     Row Name 12/23/19 1500          OT Short Term Goals    STG 1  Pt. to be (I) with strengthening HEP.  -SG     STG 1 Progress  Met  -SG     STG 2  Pt and spouse to be educated on functional shower transfer to shower chair vs tub bench.  -SG     STG 2 Progress  Met  -SG     STG 2 Progress Comments  Pt states at this time she will cont to use tub tsf bench but is performing at mod I level.  -SG        Long Term Goals    LTG 1  Patient will reach to right and left sides in standing with moderate to maximum challenge without loss of balance.  -SG     LTG 1 Progress  Met  -SG     LTG 2  Patient will be able to  midline for 10 minutes with one hand for support for increasing standing balance for self care.  -SG     LTG 2 Progress  Met  -SG     LTG 3  Patient will be able to complete laundry task with supervision.  -SG     LTG 3 Progress  Met  -SG     LTG 4  Pt to increase BUE strength to 4/5 to assist with functional tasks.  -SG     LTG 4 Progress  Met  -SG     LTG 5  Pt to transfer to shower chair with mod I.  -SG     LTG 5 Progress  Met  -SG       User Key  (r) = Recorded By, (t) = Taken By, (c) = Cosigned By    Initials Name Provider Type    Kalee Bruce OTR Occupational Therapist               OT Exercises     Row Name 12/23/19 1500             Exercise 1    Exercise Name 1  Pt performs putty exercises with Yellow. Pt performs Gross grasp, Lateral pinch, 2pt pinch and 3 jaw javier with bilateral hands.  -SG         Exercise 2    Exercise Name 2  Arm bike seated.  -SG      Time (Minutes) 2  6  -SG        User Key  (r) = Recorded By, (t) = Taken By, (c) = Cosigned By    Initials Name Provider Type    Kalee Bruce OTR Occupational Therapist              Box and Blocks  Box and Blocks Left: 70  Box and Blocks Right: 77  DASH  Open a tight or new jar.: Mild Difficulty  Write: No Difficulty  Turn a key: No  Difficulty  Prepare a meal: No Difficulty  Push open a heavy door: No Difficulty  Place an object on a shelf above your head: No Difficulty  Do heavy household chores (e.g., wash walls, wash floors): Moderate Difficulty  Garden or do yard work: Moderate Difficulty  Make a bed: No Difficulty  Carry a shopping bag or briefcase: No Difficulty  Carry a heavy object (over 10 lbs): No Difficulty  Change a lightbulb overhead: No Difficulty  Wash or blow dry your hair: No Difficulty  Wash your back: No Difficulty  Put on a pullover sweater: No Difficulty  Use a knife to cut food: No Difficulty  Recreational activities in which require little effort (e.g., cardplaying, knitting, etc.): No Difficulty  Recreational activities in which you take some force or impact through your arm, should or hand (e.g. golf, hammering, tennis, etc.): No Difficulty  Recreational Activities in which you move your arm freely (e.g., frisbee, badminton, etc.): Moderate Difficulty  Manage transportation needs (getting from one place to another): No Difficulty  During the past week, to what extent has your arm, shoulder, or hand problem interfered with your normal social activites with family, friends, neighbors or groups?: Not at all  During the past week, were you limited in your work or other regular daily activities as a result of your arm, shoulder or hand problem?: Not limited at all  Arm, Shoulder, or hand pain: None  Arm, shoulder or hand pain when you performed any specific activity: None  Tingling (pins and needles) in your arm, shoulder, or hand: None  Weakness in your arm, shoulder or hand: None  Stiffness in your arm, shoulder or hand: None  During the past week, how much difficulty have you had sleeping because of the pain in your arm, shoulder or hand?: No difficulty  I feel less capable, less confident or less useful because of my arm, shoulder or hand problem: Strongly disagree  DASH Sum : 36  Number of Questions Answered: 29  DASH  Score: 6.03         Time Calculation:   OT Start Time: 1015  OT Stop Time: 1100  OT Time Calculation (min): 45 min     Therapy Charges for Today     Code Description Service Date Service Provider Modifiers Qty    80234529484  OT THER PROC EA 15 MIN 12/23/2019 Kalee Smith OTR GO 2    [id number]  OT NEUROMUSC RE EDUCATION EA 15 MIN 12/23/2019 Kalee Smith OTR GO 1                       BILLY Peoples  12/23/2019

## 2019-12-23 NOTE — THERAPY TREATMENT NOTE
"    Outpatient Physical Therapy Neuro Treatment Note  [facility]     Patient Name: Baltazar MELO  : 1950  MRN: [id number]  Today's Date: 2019      Visit Date: 2019    Visit Dx:    ICD-10-CM ICD-9-CM   1. Stenosis of lateral recess of lumbar spine M48.061 724.02   2. Muscle weakness (generalized) M62.81 728.87   3. Functional gait abnormality R26.89 781.2       Patient Active Problem List   Diagnosis   • Hypertension   • Morbid obesity with BMI of 40.0-44.9, adult (CMS/HCC)   • Lumbar back pain with radiculopathy affecting right lower extremity   • Diabetes mellitus (CMS/HCC)   • Renal lesion   • Paroxysmal atrial fibrillation (CMS/[facility])   • Post-operative state   • Weakness of both lower extremities           PT Neuro     Row Name 19 1105             Subjective Comments    Subjective Comments  \"Doing good.\"   -LB         Precautions and Contraindications    Precautions/Limitations  fall precautions  -LB      Precautions  Latex Allergy , spinal surgery 19 GREAT difficulty with the CURB on IE   -LB         Subjective Pain    Able to rate subjective pain?  yes  -LB      Pre-Treatment Pain Level  0  -LB      Post-Treatment Pain Level  0  -LB         Cognition    Overall Cognitive Status  WFL  -LB      Memory  Appears intact  -LB      Orientation Level  Oriented X4  -LB      Safety Judgment  Good awareness of safety precautions  -LB      Deficits  Fully aware of deficits  -LB         Posture/Observations    Alignment Options  Forward head  -LB      Forward Head  Mild  -LB      Observations  Edema minimal in bilateral ankles and feet  -LB         Bed Mobility Assessment/Treatment    Comment (Bed Mobility)  not performed  -LB         Transfers    Sit-Stand [place] (Transfers)  stand by assist from an armless chair  -LB      Stand-Sit [place] (Transfers)  stand by assist to an armless chair  -LB      Transfers, Sit-Stand-Sit, Assist Device  rolling walker  -LB      Transfer, " "Safety Issues  balance decreased during turns;sequencing ability decreased;weight-shifting ability decreased  -LB      Comment (Transfers)  TRANSFERS to and from the Drive C-Lever rollator,   conditional independent with sit to stand from w/c   -LB         Gait/Stairs Assessment/Training    [place] Level (Gait)  stand by assist;contact guard  -LB      Assistive Device (Gait)  walker, front-wheeled;AFO;walker, 4-wheeled C-[address] rollator   -LB      Distance in Feet (Gait)  90' with rollator and AFO, 80' with R Wx without AFO    -LB      Pattern (Gait)  step-through  -LB      Deviations/Abnormal Patterns (Gait)  gait speed decreased;stride length decreased  -LB      Bilateral Gait Deviations  forward flexed posture  -LB      Left Sided Gait Deviations  heel strike decreased;weight shift ability decreased  -LB      Right Sided Gait Deviations  weight shift ability decreased  -LB      Negotiation (Stairs)  other (see comments) Pt's steps at home are 8\" in ht, handrail ht 32\" to 35\"   -LB      Stairs, Safety Issues  sequencing ability decreased  -LB      Stairs, Impairments  strength decreased;impaired balance  -LB      Comment (Gait/Stairs)  Pt demonstrated good control of the rollator.  -LB         Orthotic/Prosthetic Management    Orthosis Location  AFO (ankle foot orthosis)  -LB         Ankle Foot Orthosis Management    Type (Ankle/Foot Orthosis)  --  posterior lateral strut AFO (Thuasne  -LB      Wearing Schedule (Ankle Foot Orthosis)  wear with activity/work  -LB      Orthosis Training (Ankle Foot Orthosis)  patient and caregiver  -LB        User Key  (r) = Recorded By, (t) = Taken By, (c) = Cosigned By    Initials Name Provider Type    LB Nalini Mccall, PT Physical Therapist                  PT Assessment/Plan     Row Name 12/23/19 6630          PT Assessment    Assessment Comments  Pt demonstrated good quality with ambulation with the C-Lever rollator with 5\" swivel wheels. Pt was able to perfrom " "transfers to and from the rollator with CGA.   -LB       User Key  (r) = Recorded By, (t) = Taken By, (c) = Cosigned By    Initials Name Provider Type    Nalini Becker, EARNESTINE Physical Therapist             OP Exercises     Row Name 12/23/19 1105             Subjective Comments    Subjective Comments  \"Doing good.\"   -LB         Subjective Pain    Able to rate subjective pain?  yes  -LB      Pre-Treatment Pain Level  0  -LB      Post-Treatment Pain Level  0  -LB         Exercise 1    Exercise Name 1  gastroc stretch on incline at end of ll bars with B UE support wearing her shoes w/o orthotic or AFO performed before balance activities  -LB      Cueing 1  Verbal;Tactile  -LB      Reps 1  2  -LB      Time 1  60  -LB         Exercise 2    Exercise Name 2  Step-ups onto 4\" step within the ll bars with strong CGA   -LB      Cueing 2  Verbal;Tactile  -LB      Reps 2  12 each LE   -LB         Exercise 3    Exercise Name 3  AROM / RROM  bilateral ankles in all planes   -LB      Cueing 3  Verbal;Tactile  -LB      Reps 3  8  -LB         Exercise 4    Exercise Name 4  bilateral plantarflexion while standing on the incline at the end of the ll bars with strong pressure thru UE's   -LB      Cueing 4  Verbal;Tactile  -LB      Reps 4  12  -LB        User Key  (r) = Recorded By, (t) = Taken By, (c) = Cosigned By    Initials Name Provider Type    Nalini Becker, EARNESTINE Physical Therapist                          Therapy Education  Education Details: Reviewed the benefits of the swivel wheels of the C-Lever Drive rollator. Transfers to and from the C-[address]ever [address] rollator seat.   Given: Mobility training  How Provided: Verbal, Demonstration  Provided to: Patient  Level of Understanding: Teach back education performed, Verbalized, Demonstrated              Time Calculation:   Start Time: 1105  Stop Time: 1150  Time Calculation (min): 45 min  Total Timed Code Minutes- PT: 45 minute(s)   Therapy Charges for Today     Code Description " Service Date Service Provider Modifiers Qty    11292941587 HC PT NEUROMUSC RE EDUCATION EA 15 MIN 12/23/2019 Nalini Mccall, PT GP 3                    Nalini Mccall, PT  12/23/2019

## 2019-12-27 ENCOUNTER — HOSPITAL ENCOUNTER (OUTPATIENT)
Dept: PHYSICAL THERAPY | Facility: HOSPITAL | Age: 69
Setting detail: THERAPIES SERIES
Discharge: HOME | End: 2019-12-27

## 2019-12-27 ENCOUNTER — APPOINTMENT (OUTPATIENT)
Dept: OCCUPATIONAL THERAPY | Facility: HOSPITAL | Age: 69
End: 2019-12-27

## 2019-12-27 DIAGNOSIS — M62.81 MUSCLE WEAKNESS (GENERALIZED): ICD-10-CM

## 2019-12-27 DIAGNOSIS — M48.061 STENOSIS OF LATERAL RECESS OF LUMBAR SPINE: Primary | ICD-10-CM

## 2019-12-27 DIAGNOSIS — R26.89 FUNCTIONAL GAIT ABNORMALITY: ICD-10-CM

## 2019-12-27 DIAGNOSIS — Z98.890 HISTORY OF LUMBAR LAMINECTOMY FOR SPINAL CORD DECOMPRESSION: ICD-10-CM

## 2019-12-27 PROCEDURE — 97112 NEUROMUSCULAR REEDUCATION: CPT

## 2019-12-27 NOTE — THERAPY TREATMENT NOTE
"    Outpatient Physical Therapy Neuro Treatment Note  [facility]     Patient Name: Baltazar MELO  : 1950  MRN: [id number]  Today's Date: 2019      Visit Date: 2019    Visit Dx:    ICD-10-CM ICD-9-CM   1. Stenosis of lateral recess of lumbar spine M48.061 724.02   2. Muscle weakness (generalized) M62.81 728.87   3. Functional gait abnormality R26.89 781.2   4. History of lumbar laminectomy for spinal cord decompression Z98.890 V45.89       Patient Active Problem List   Diagnosis   • Hypertension   • Morbid obesity with BMI of 40.0-44.9, adult (CMS/HCC)   • Lumbar back pain with radiculopathy affecting right lower extremity   • Diabetes mellitus (CMS/HCC)   • Renal lesion   • Paroxysmal atrial fibrillation (CMS/HCC)   • Post-operative state   • Weakness of both lower extremities           PT Neuro     Row Name 19 0931             Subjective Comments    Subjective Comments  \"I am a little tired from the holidays.\"   -LB         Precautions and Contraindications    Precautions/Limitations  fall precautions  -LB      Precautions  Latex Allergy , spinal surgery 19 GREAT difficulty with the CURB on IE   -LB         Subjective Pain    Able to rate subjective pain?  yes  -LB      Pre-Treatment Pain Level  1  -LB      Post-Treatment Pain Level  1  -LB      Subjective Pain Comment  \"A little discomofrt in my back.\" \"I woke up a little sore. I slept a little half on my stomach.\" Pt was advised to call her surgeon's office to get OK to resume sleeping on her stomach.  -LB         Cognition    Overall Cognitive Status  WFL  -LB      Memory  Appears intact  -LB      Orientation Level  Oriented X4  -LB      Safety Judgment  Good awareness of safety precautions  -LB      Deficits  Fully aware of deficits  -LB         Transfers    Sit-Stand [place] (Transfers)  stand by assist from an armless chair  -LB      Stand-Sit [place] (Transfers)  stand by assist to an armless chair  -LB      " "Transfers, Sit-Stand-Sit, Assist Device  rolling walker  -LB      Transfer, Safety Issues  balance decreased during turns;sequencing ability decreased;weight-shifting ability decreased  -LB         Gait/Stairs Assessment/Training    [place] Level (Gait)  contact guard min. tactile cues to bilateral gluts, vc's to relax shoulder  -LB      Assistive Device (Gait)  walker, front-wheeled;AFO  -LB      Distance in Feet (Gait)  90' x 2   -LB      Pattern (Gait)  step-through  -LB      Deviations/Abnormal Patterns (Gait)  gait speed decreased;stride length decreased  -LB      Bilateral Gait Deviations  forward flexed posture  -LB      Left Sided Gait Deviations  weight shift ability decreased  -LB      Right Sided Gait Deviations  weight shift ability decreased  -LB      Negotiation (Stairs)  -- Pt's steps at home are 8\" in ht, handrail ht 32\" to 35\"   -LB      [place] Level (Stairs)  not tested  -LB         Balance Skills Training    Standing-Level of Assistance  Contact guard  -LB      Static Standing Balance Support  Right upper extremity supported;Left upper extremity supported;parallel bars  -LB      Standing-Balance Activities  Semi-tandum  -LB      Gait Balance-Level of Assistance  Contact guard;Minimum assistance  -LB      Gait Balance Support  Right upper extremity supported;Left upper extremity supported;parallel bars  -LB      Gait Balance Activities  side-stepping;backwards without AFO   -LB         Orthotic/Prosthetic Management    Orthosis Location  AFO (ankle foot orthosis)  -LB         Ankle Foot Orthosis Management    Type (Ankle/Foot Orthosis)  --  posterior lateral strut AFO ([place]  -LB      Wearing Schedule (Ankle Foot Orthosis)  wear with activity/work  -LB      Orthosis Training (Ankle Foot Orthosis)  patient and caregiver  -LB      Compliance/Wearing Issues (Ankle Foot Orthosis)  patient/caregiver comprehend strategies  -LB        User Key  (r) = Recorded By, (t) = Taken By, (c) = " "Cosigned By    Initials Name Provider Type    Nalini Becker PT Physical Therapist                  PT Assessment/Plan     Row Name 12/27/19 2310          PT Assessment    Assessment Comments  Pt demonstrated shoulder elevation throughout gait cycle. Pt had great difficulty allowing her shoulders to relax and transfer weight to ther LE's.   -LB       User Key  (r) = Recorded By, (t) = Taken By, (c) = Cosigned By    Initials Name Provider Type    Nalini Becker PT Physical Therapist             OP Exercises     Row Name 12/27/19 0996             Subjective Comments    Subjective Comments  \"I am a little tired from the holidays.\"   -LB         Subjective Pain    Able to rate subjective pain?  yes  -LB      Pre-Treatment Pain Level  1  -LB      Post-Treatment Pain Level  1  -LB      Subjective Pain Comment  \"A little discomofrt in my back.\" \"I woke up a little sore. I slept a little half on my stomach.\" Pt was advised to call her surgeon's office to get OK to resume sleeping on her stomach.  -LB         Exercise 1    Exercise Name 1  gastroc stretch on incline at end of ll bars with B UE support wearing her shoes w/o orthotic or AFO performed before balance activities  -LB      Cueing 1  Verbal;Tactile  -LB      Time 1  60  -LB         Exercise 2    Exercise Name 2  Step-ups onto 4\" step within the ll bars with strong CGA   -LB      Cueing 2  Verbal;Tactile  -LB      Reps 2  10 each LE   -LB         Exercise 3    Exercise Name 3  AROM / RROM  bilateral ankles in all planes   -LB      Cueing 3  Verbal;Tactile  -LB      Reps 3  7  -LB        User Key  (r) = Recorded By, (t) = Taken By, (c) = Cosigned By    Initials Name Provider Type    Nalini Becker PT Physical Therapist                          Therapy Education  Education Details: Emphasized trying to relax shoulders during ambulation and reduce shoulder elevation.  Given: Mobility training  How Provided: Verbal  Provided to: Patient  Level of " Understanding: Verbalized, Demonstrated, Teach back education performed              Time Calculation:   Start Time: 0932  Stop Time: 1015  Time Calculation (min): 43 min  Total Timed Code Minutes- PT: 43 minute(s)   Therapy Charges for Today     Code Description Service Date Service Provider Modifiers Qty    25474220026 HC PT NEUROMUSC RE EDUCATION EA 15 MIN 12/27/2019 Nalini Mccall, PT GP 3                    Nalini Mccall, PT  12/27/2019

## 2019-12-30 ENCOUNTER — HOSPITAL ENCOUNTER (OUTPATIENT)
Dept: PHYSICAL THERAPY | Facility: HOSPITAL | Age: 69
Setting detail: THERAPIES SERIES
Discharge: HOME | End: 2019-12-30

## 2019-12-30 ENCOUNTER — APPOINTMENT (OUTPATIENT)
Dept: OCCUPATIONAL THERAPY | Facility: HOSPITAL | Age: 69
End: 2019-12-30

## 2019-12-30 DIAGNOSIS — M48.061 STENOSIS OF LATERAL RECESS OF LUMBAR SPINE: Primary | ICD-10-CM

## 2019-12-30 DIAGNOSIS — Z98.890 HISTORY OF LUMBAR LAMINECTOMY FOR SPINAL CORD DECOMPRESSION: ICD-10-CM

## 2019-12-30 DIAGNOSIS — M62.81 MUSCLE WEAKNESS (GENERALIZED): ICD-10-CM

## 2019-12-30 DIAGNOSIS — R26.89 FUNCTIONAL GAIT ABNORMALITY: ICD-10-CM

## 2019-12-30 PROCEDURE — 97112 NEUROMUSCULAR REEDUCATION: CPT

## 2019-12-30 NOTE — THERAPY TREATMENT NOTE
"    Outpatient Physical Therapy Neuro Treatment Note  [facility]     Patient Name: Baltazar MELO  : 1950  MRN: [id number]  Today's Date: 2019      Visit Date: 2019    Visit Dx:    ICD-10-CM ICD-9-CM   1. Stenosis of lateral recess of lumbar spine M48.061 724.02   2. Muscle weakness (generalized) M62.81 728.87   3. Functional gait abnormality R26.89 781.2   4. History of lumbar laminectomy for spinal cord decompression Z98.890 V45.89       Patient Active Problem List   Diagnosis   • Hypertension   • Morbid obesity with BMI of 40.0-44.9, adult (CMS/HCC)   • Lumbar back pain with radiculopathy affecting right lower extremity   • Diabetes mellitus (CMS/HCC)   • Renal lesion   • Paroxysmal atrial fibrillation (CMS/HCC)   • Post-operative state   • Weakness of both lower extremities           PT Neuro     Row Name 19 1102             Subjective Comments    Subjective Comments  \"I am feeling more comfortable with the AFO. I can tell I walk better with it.\"   -LB         Precautions and Contraindications    Precautions/Limitations  fall precautions  -LB      Precautions  Latex Allergy , spinal surgery 19 GREAT difficulty with the CURB on IE   -LB         Subjective Pain    Able to rate subjective pain?  yes  -LB      Pre-Treatment Pain Level  1  -LB      Post-Treatment Pain Level  1  -LB         Cognition    Overall Cognitive Status  WFL  -LB      Memory  Appears intact  -LB      Orientation Level  Oriented X4  -LB      Safety Judgment  Good awareness of safety precautions  -LB      Deficits  Fully aware of deficits  -LB         Posture/Observations    Alignment Options  Forward head  -LB      Forward Head  Mild  -LB      Observations  Edema minimal swelling in the bilateral legs   -LB         Transfers    Sit-Stand [place] (Transfers)  stand by assist from an armless chair  -LB      Stand-Sit [place] (Transfers)  stand by assist to an armless chair   -LB      Transfers, " Sit-Stand-Sit, Assist Device  rolling walker  -LB      Transfer, Safety Issues  balance decreased during turns;sequencing ability decreased;weight-shifting ability decreased  -LB         Gait/Stairs Assessment/Training    [place] Level (Gait)  contact guard min. tactile cues to bilateral gluts, vc's to relax shoulder  -LB      Assistive Device (Gait)  walker, front-wheeled;AFO  -LB      Distance in Feet (Gait)  100' x 2 with AFO in place, 80' without AFO   -LB      Pattern (Gait)  step-through  -LB      Deviations/Abnormal Patterns (Gait)  gait speed decreased;stride length decreased  -LB      Bilateral Gait Deviations  forward flexed posture  -LB      Left Sided Gait Deviations  weight shift ability decreased  -LB      Right Sided Gait Deviations  weight shift ability decreased  -LB      [place] Level (Stairs)  moderate assist (50% patient effort);verbal cues HHA on the L more pressure thru PT's hand ascending  -LB      Handrail Location (Stairs)  right side (ascending)  -LB      Number of Steps (Stairs)  3  -LB      Ascending Technique (Stairs)  (S) step-to-step labored and pt pushing strongly thru PT's hand  -LB      Descending Technique (Stairs)  step-to-step  -LB      Stairs, Safety Issues  sequencing ability decreased  -LB      Stairs, Impairments  strength decreased;impaired balance  -LB      Comment (Gait/Stairs)  After placing R LE up the step pt hesitates and requires great effort and moderate HHA as she places L LE onto step.   -LB         Balance Skills Training    Standing-Level of Assistance  Contact guard  -LB      Static Standing Balance Support  Right upper extremity supported;Left upper extremity supported;parallel bars  -LB      Standing-Balance Activities  Semi-tandum;Compliant surfaces;Standing on 1/2 roll  -LB      Gait Balance-Level of Assistance  Contact guard  -LB      Gait Balance Support  Right upper extremity supported;Left upper extremity supported;parallel bars  -LB      Gait  "Balance Activities  side-stepping;backwards without AFO   -LB         Orthotic/Prosthetic Management    Orthosis Location  AFO (ankle foot orthosis)  -LB         Ankle Foot Orthosis Management    Type (Ankle/Foot Orthosis)  -- posterior lateral strut AFO ([place]  -LB      Wearing Schedule (Ankle Foot Orthosis)  wear with activity/work  -LB      Orthosis Training (Ankle Foot Orthosis)  patient and caregiver  -LB      Compliance/Wearing Issues (Ankle Foot Orthosis)  patient/caregiver comprehend strategies  -LB        User Key  (r) = Recorded By, (t) = Taken By, (c) = Cosigned By    Initials Name Provider Type    Nalini Becker, PT Physical Therapist                  PT Assessment/Plan     Row Name 12/30/19 1610          PT Assessment    Assessment Comments  Pt ascended and descended 4 steps with the rail on the left when ascenidng to simulate up to her kitchen area. Pt has more difficulty when ascending and places great pressure through PT's hand.   -LB       User Key  (r) = Recorded By, (t) = Taken By, (c) = Cosigned By    Initials Name Provider Type    Nalini Becker, PT Physical Therapist             OP Exercises     Row Name 12/30/19 1102             Subjective Comments    Subjective Comments  \"I am feeling more comfortable with the AFO. I can tell I walk better with it.\"   -LB         Subjective Pain    Able to rate subjective pain?  yes  -LB      Pre-Treatment Pain Level  1  -LB      Post-Treatment Pain Level  1  -LB         Exercise 1    Exercise Name 1  gastroc stretch on incline at end of ll bars with B UE support wearing her shoes w/o orthotic or AFO performed before balance activities  -LB      Cueing 1  Verbal;Tactile  -LB      Time 1  60  -LB         Exercise 2    Exercise Name 2  Step-ups onto 4\" step within the ll bars with strong CGA   -LB      Cueing 2  Verbal;Tactile  -LB      Reps 2  7 each LE   -LB         Exercise 4    Exercise Name 4  bilateral plantarflexion while standing on the incline " at the end of the ll bars with strong pressure thru UE's   -LB      Cueing 4  Verbal;Tactile  -LB      Reps 4  14  -LB        User Key  (r) = Recorded By, (t) = Taken By, (c) = Cosigned By    Initials Name Provider Type    Nalini Becker, PT Physical Therapist                          Therapy Education  Education Details: Emphasized to pt push thru PT's hand with HHA verses rotating the hand when ascending and descending steps.   How Provided: Verbal  Provided to: Patient, Caregiver(caregiver observed )  Level of Understanding: Teach back education performed, Verbalized, Demonstrated              Time Calculation:   Start Time: 1102  Stop Time: 1147  Time Calculation (min): 45 min  Total Timed Code Minutes- PT: 45 minute(s)   Therapy Charges for Today     Code Description Service Date Service Provider Modifiers Qty    78573983782 HC PT NEUROMUSC RE EDUCATION EA 15 MIN 12/30/2019 Nalini Mccall, PT GP 3                    Nalini Mccall, PT  12/30/2019

## 2020-01-03 ENCOUNTER — APPOINTMENT (OUTPATIENT)
Dept: OCCUPATIONAL THERAPY | Facility: HOSPITAL | Age: 70
End: 2020-01-03

## 2020-01-03 ENCOUNTER — HOSPITAL ENCOUNTER (OUTPATIENT)
Dept: PHYSICAL THERAPY | Facility: HOSPITAL | Age: 70
Setting detail: THERAPIES SERIES
Discharge: HOME | End: 2020-01-03

## 2020-01-03 DIAGNOSIS — M48.061 STENOSIS OF LATERAL RECESS OF LUMBAR SPINE: Primary | ICD-10-CM

## 2020-01-03 DIAGNOSIS — M62.81 MUSCLE WEAKNESS (GENERALIZED): ICD-10-CM

## 2020-01-03 DIAGNOSIS — Z98.890 HISTORY OF LUMBAR LAMINECTOMY FOR SPINAL CORD DECOMPRESSION: ICD-10-CM

## 2020-01-03 DIAGNOSIS — R26.89 FUNCTIONAL GAIT ABNORMALITY: ICD-10-CM

## 2020-01-03 PROCEDURE — 97112 NEUROMUSCULAR REEDUCATION: CPT

## 2020-01-03 NOTE — THERAPY TREATMENT NOTE
"    Outpatient Physical Therapy Neuro Treatment Note  [facility]     Patient Name: Baltazar MELO  : 1950  MRN: [id number]  Today's Date: 1/3/2020      Visit Date: 2020    Visit Dx:    ICD-10-CM ICD-9-CM   1. Stenosis of lateral recess of lumbar spine M48.061 724.02   2. Muscle weakness (generalized) M62.81 728.87   3. Functional gait abnormality R26.89 781.2   4. History of lumbar laminectomy for spinal cord decompression Z98.890 V45.89       Patient Active Problem List   Diagnosis   • Hypertension   • Morbid obesity with BMI of 40.0-44.9, adult (CMS/HCC)   • Lumbar back pain with radiculopathy affecting right lower extremity   • Diabetes mellitus (CMS/HCC)   • Renal lesion   • Paroxysmal atrial fibrillation (CMS/HCC)   • Post-operative state   • Weakness of both lower extremities           PT Neuro     Row Name 20 0930             Subjective Comments    Subjective Comments  \"My sinuses are really bothering me today.\"  \"Before the surgery I couldn't move my foot.\"   -LB         Precautions and Contraindications    Precautions  Latex Allergy , spinal surgery 19 GREAT difficulty with the CURB on IE   -LB         Subjective Pain    Able to rate subjective pain?  yes  -LB      Pre-Treatment Pain Level  0  -LB      Post-Treatment Pain Level  0  -LB         Cognition    Overall Cognitive Status  WFL  -LB      Memory  Appears intact  -LB      Orientation Level  Oriented X4  -LB      Safety Judgment  Good awareness of safety precautions  -LB      Deficits  Fully aware of deficits  -LB         Posture/Observations    Alignment Options  Forward head  -LB      Forward Head  Mild  -LB      Observations  Edema minimal swelling in bilateral legs   -LB         Bed Mobility Assessment/Treatment    Comment (Bed Mobility)  not performed  -LB         Transfers    Sit-Stand [place] (Transfers)  stand by assist from armless chair  -LB      Stand-Sit [place] (Transfers)  stand by assist to armless " chair  -LB      Transfers, Sit-Stand-Sit, Assist Device  rolling walker  -LB      Transfer, Safety Issues  weight-shifting ability decreased;balance decreased during turns  -LB         Gait/Stairs Assessment/Training    [place] Level (Gait)  contact guard  -LB      Assistive Device (Gait)  AFO;walker, front-wheeled;walker, 4-wheeled  -LB      Distance in Feet (Gait)  100' with rolling walker, 90' with rollator  -LB      Pattern (Gait)  step-through  -LB      Deviations/Abnormal Patterns (Gait)  gait speed decreased;stride length decreased  -LB      Bilateral Gait Deviations  forward flexed posture  -LB      Left Sided Gait Deviations  weight shift ability decreased  -LB      Right Sided Gait Deviations  weight shift ability decreased  -LB      [place] Level (Stairs)  not tested  -LB      [place] Level (Ramp)  contact guard;2 person assist;verbal cues  -LB      Assistive Device (Ramp)  walker, 4-wheeled  -LB      Comment (Gait/Stairs)  PT emphasized small steps with descending the steps.   -LB         Balance Skills Training    Standing-Level of Assistance  Contact guard without AFO   -LB      Static Standing Balance Support  Right upper extremity supported;Left upper extremity supported;parallel bars  -LB      Standing-Balance Activities  Semi-tandum;Compliant surfaces;Standing on 1/2 roll  -LB      Gait Balance-Level of Assistance  Contact guard without AFO   -LB      Gait Balance Support  Right upper extremity supported;Left upper extremity supported;parallel bars  -LB      Gait Balance Activities  side-stepping  -LB         Orthotic/Prosthetic Management    Orthosis Location  AFO (ankle foot orthosis)  -LB         Ankle Foot Orthosis Management    Type (Ankle/Foot Orthosis)  -- posterior lateral strut AFO (Thuasne  -LB      Wearing Schedule (Ankle Foot Orthosis)  wear with activity/work  -LB      Orthosis Training (Ankle Foot Orthosis)  patient and caregiver  -LB      Compliance/Wearing Issues  "(Ankle Foot Orthosis)  patient/caregiver comprehend strategies  -LB        User Key  (r) = Recorded By, (t) = Taken By, (c) = Cosigned By    Initials Name Provider Type    Nalini Becker PT Physical Therapist                  PT Assessment/Plan     Row Name 01/03/20 1543          PT Assessment    Assessment Comments  Pt ambulating with decreased weight thru her UE's with good stability. Pt was able to maintain balance while ambulating with the C-Lever Rollator.   -LB       User Key  (r) = Recorded By, (t) = Taken By, (c) = Cosigned By    Initials Name Provider Type    Nalini Becker PT Physical Therapist             OP Exercises     Row Name 01/03/20 0930             Subjective Comments    Subjective Comments  \"My sinuses are really bothering me today.\"  \"Before the surgery I couldn't move my foot.\"   -LB         Subjective Pain    Able to rate subjective pain?  yes  -LB      Pre-Treatment Pain Level  0  -LB      Post-Treatment Pain Level  0  -LB         Exercise 1    Exercise Name 1  gastroc stretch on incline at end of ll bars with B UE support wearing her shoes w/o orthotic or AFO performed before balance activities  -LB      Cueing 1  Verbal;Tactile  -LB      Time 1  60  -LB         Exercise 2    Exercise Name 2  Step-ups onto 4\" step within the ll bars with strong CGA   -LB      Cueing 2  Verbal;Tactile  -LB      Reps 2  8 each LE   -LB         Exercise 3    Exercise Name 3  AROM / RROM  bilateral ankles in all planes   -LB      Cueing 3  Verbal;Tactile  -LB      Reps 3  10  -LB         Exercise 4    Exercise Name 4  bilateral plantarflexion while standing on the incline at the end of the ll bars with strong pressure thru UE's   -LB      Cueing 4  Verbal;Tactile  -LB      Reps 4  12  -LB        User Key  (r) = Recorded By, (t) = Taken By, (c) = Cosigned By    Initials Name Provider Type    Nalini Becker PT Physical Therapist                          Therapy Education  Education Details: Ascending " and descending on a ramp with rollator with emphasis on descending with small steps.   Given: Mobility training  How Provided: Verbal  Provided to: Patient  Level of Understanding: Teach back education performed, Verbalized, Demonstrated              Time Calculation:   Start Time: 0930  Stop Time: 1015  Time Calculation (min): 45 min  Total Timed Code Minutes- PT: 45 minute(s)   Therapy Charges for Today     Code Description Service Date Service Provider Modifiers Qty    99410423954 HC PT NEUROMUSC RE EDUCATION EA 15 MIN 1/3/2020 Nalini Mccall, PT GP 3                    Nalini Mccall, PT  1/3/2020

## 2020-01-06 ENCOUNTER — APPOINTMENT (OUTPATIENT)
Dept: OCCUPATIONAL THERAPY | Facility: HOSPITAL | Age: 70
End: 2020-01-06

## 2020-01-06 ENCOUNTER — HOSPITAL ENCOUNTER (OUTPATIENT)
Dept: PHYSICAL THERAPY | Facility: HOSPITAL | Age: 70
Setting detail: THERAPIES SERIES
Discharge: HOME | End: 2020-01-06

## 2020-01-06 DIAGNOSIS — Z98.890 HISTORY OF LUMBAR LAMINECTOMY FOR SPINAL CORD DECOMPRESSION: ICD-10-CM

## 2020-01-06 DIAGNOSIS — R26.89 FUNCTIONAL GAIT ABNORMALITY: ICD-10-CM

## 2020-01-06 DIAGNOSIS — M48.061 STENOSIS OF LATERAL RECESS OF LUMBAR SPINE: Primary | ICD-10-CM

## 2020-01-06 DIAGNOSIS — M62.81 MUSCLE WEAKNESS (GENERALIZED): ICD-10-CM

## 2020-01-06 PROCEDURE — 97112 NEUROMUSCULAR REEDUCATION: CPT

## 2020-01-10 ENCOUNTER — HOSPITAL ENCOUNTER (OUTPATIENT)
Dept: PHYSICAL THERAPY | Facility: HOSPITAL | Age: 70
Setting detail: THERAPIES SERIES
Discharge: HOME | End: 2020-01-10

## 2020-01-10 DIAGNOSIS — R26.89 FUNCTIONAL GAIT ABNORMALITY: ICD-10-CM

## 2020-01-10 DIAGNOSIS — M62.81 MUSCLE WEAKNESS (GENERALIZED): ICD-10-CM

## 2020-01-10 DIAGNOSIS — M48.061 STENOSIS OF LATERAL RECESS OF LUMBAR SPINE: Primary | ICD-10-CM

## 2020-01-10 DIAGNOSIS — Z98.890 HISTORY OF LUMBAR LAMINECTOMY FOR SPINAL CORD DECOMPRESSION: ICD-10-CM

## 2020-01-10 PROCEDURE — 97112 NEUROMUSCULAR REEDUCATION: CPT

## 2020-01-10 NOTE — THERAPY TREATMENT NOTE
"    Outpatient Physical Therapy Neuro Treatment Note  [facility]     Patient Name: Baltazar MELO  : 1950  MRN: [id number]  Today's Date: 1/10/2020      Visit Date: 01/10/2020    Visit Dx:    ICD-10-CM ICD-9-CM   1. Stenosis of lateral recess of lumbar spine M48.061 724.02   2. Muscle weakness (generalized) M62.81 728.87   3. Functional gait abnormality R26.89 781.2   4. History of lumbar laminectomy for spinal cord decompression Z98.890 V45.89       Patient Active Problem List   Diagnosis   • Hypertension   • Morbid obesity with BMI of 40.0-44.9, adult (CMS/HCC)   • Lumbar back pain with radiculopathy affecting right lower extremity   • Diabetes mellitus (CMS/HCC)   • Renal lesion   • Paroxysmal atrial fibrillation (CMS/HCC)   • Post-operative state   • Weakness of both lower extremities           PT Neuro     Row Name 01/10/20 0930             Subjective Comments    Subjective Comments  \"I am tired of using that wheelchair.\" (pt was sitting in a chair next to her w/c.   -LB         Precautions and Contraindications    Precautions  Latex Allergy , spinal surgery 19 GREAT difficulty with the CURB on IE   -LB         Subjective Pain    Able to rate subjective pain?  yes  -LB      Pre-Treatment Pain Level  0  -LB      Post-Treatment Pain Level  0  -LB         Cognition    Overall Cognitive Status  WFL  -LB      Memory  Appears intact  -LB      Orientation Level  Oriented X4  -LB      Safety Judgment  Good awareness of safety precautions  -LB         Posture/Observations    Alignment Options  Forward head  -LB      Forward Head  Mild  -LB      Observations  Edema minimal swelling bilateral days  -LB         Transfers    Sit-Stand [place] (Transfers)  stand by assist  -LB      Stand-Sit [place] (Transfers)  stand by assist  -LB      Transfers, Sit-Stand-Sit, Assist Device  other (see comments) rollator   -LB      Transfer, Safety Issues  weight-shifting ability decreased;balance decreased " during turns  -LB      Comment (Transfers)  TRANSFERS to and from the Drive C-Lever rollator with SBA and verbal cues.   -LB         Gait/Stairs Assessment/Training    [place] Level (Gait)  contact guard  -LB      Assistive Device (Gait)  AFO;walker, 4-wheeled C-Lever Drive Rollator   -LB      Distance in Feet (Gait)  120' x 2 with AFO in place, 80' without AFO   -LB      Pattern (Gait)  step-through  -LB      Deviations/Abnormal Patterns (Gait)  gait speed decreased;stride length decreased  -LB      Bilateral Gait Deviations  forward flexed posture  -LB      Left Sided Gait Deviations  weight shift ability decreased  -LB      Right Sided Gait Deviations  weight shift ability decreased  -LB      [place] Level (Stairs)  not tested  -LB      [place] Level (Ramp)  contact guard;verbal cues  -LB      Assistive Device (Ramp)  -- C-Lever Drive Rollator on ramp on pedway   -LB         Balance Skills Training    Gait Balance-Level of Assistance  Contact guard  -LB      Gait Balance Support  Right upper extremity supported;Left upper extremity supported;parallel bars  -LB      Gait Balance Activities  side-stepping  -LB         Orthotic/Prosthetic Management    Orthosis Location  AFO (ankle foot orthosis)  -LB         Ankle Foot Orthosis Management    Type (Ankle/Foot Orthosis)  -- posterior lateral strut AFO (Thuasne  -LB      Wearing Schedule (Ankle Foot Orthosis)  wear with activity/work  -LB      Orthosis Training (Ankle Foot Orthosis)  patient and caregiver  -LB      Compliance/Wearing Issues (Ankle Foot Orthosis)  patient/caregiver comprehend strategies  -LB        User Key  (r) = Recorded By, (t) = Taken By, (c) = Cosigned By    Initials Name Provider Type    LB Nalini Mccall, PT Physical Therapist                  PT Assessment/Plan     Row Name 01/10/20 9106          PT Assessment    Assessment Comments  Pt demonstrated good stability while ambulating with the C-Lever rollator on level surface and on  "the ramp. Pt reporting she was tired of her w/c. Pt reporting she was planning on buying the C-Lever rollator.   -LB       User Key  (r) = Recorded By, (t) = Taken By, (c) = Cosigned By    Initials Name Provider Type    Nalini Becker PT Physical Therapist             OP Exercises     Row Name 01/10/20 0930             Subjective Comments    Subjective Comments  \"I am tired of using that wheelchair.\" (pt was sitting in a chair next to her w/c.   -LB         Subjective Pain    Able to rate subjective pain?  yes  -LB      Pre-Treatment Pain Level  0  -LB      Post-Treatment Pain Level  0  -LB         Exercise 2    Exercise Name 2  Step-ups onto 4\" step within the ll bars with strong CGA   -LB      Cueing 2  Verbal;Tactile  -LB      Reps 2  10 each LE   -LB         Exercise 3    Exercise Name 3  AROM / RROM  bilateral ankles in all planes   -LB      Cueing 3  Verbal;Tactile  -LB      Reps 3  10  -LB         Exercise 4    Exercise Name 4  bilateral plantarflexion while standing on the incline at the end of the ll bars with strong pressure thru UE's   -LB      Cueing 4  Verbal;Tactile CGA   -LB      Reps 4  10  -LB         Exercise 7    Exercise Name 7  knee flexion each LE in standing within ll bars with minimal tactile cues to fully shift to the L LE  -LB      Cueing 7  Verbal;Tactile  -LB      Reps 7  6 each LE   -LB      Additional Comments  Tactile cues to left gluts (for stability)  when flexing R knee   -LB        User Key  (r) = Recorded By, (t) = Taken By, (c) = Cosigned By    Initials Name Provider Type    Nalnii Becker PT Physical Therapist                          Therapy Education  Education Details: Instructed pt and  in ascending and descending a ramp with the C-Lever Drive rollator.   How Provided: Verbal  Provided to: Patient, Caregiver  Level of Understanding: Teach back education performed, Verbalized, Demonstrated              Time Calculation:   Start Time: 0930  Stop Time: 1015  Time " Calculation (min): 45 min  Total Timed Code Minutes- PT: 45 minute(s)   Therapy Charges for Today     Code Description Service Date Service Provider Modifiers Qty    19733482127 HC PT NEUROMUSC RE EDUCATION EA 15 MIN 1/10/2020 Nalini Mccall, PT GP 3                    Nalini Mccall, PT  1/10/2020

## 2020-01-13 ENCOUNTER — HOSPITAL ENCOUNTER (OUTPATIENT)
Dept: PHYSICAL THERAPY | Facility: HOSPITAL | Age: 70
Setting detail: THERAPIES SERIES
Discharge: HOME | End: 2020-01-13

## 2020-01-13 DIAGNOSIS — M62.81 MUSCLE WEAKNESS (GENERALIZED): Primary | ICD-10-CM

## 2020-01-13 DIAGNOSIS — Z98.890 HISTORY OF LUMBAR LAMINECTOMY FOR SPINAL CORD DECOMPRESSION: ICD-10-CM

## 2020-01-13 DIAGNOSIS — R26.89 FUNCTIONAL GAIT ABNORMALITY: ICD-10-CM

## 2020-01-13 PROCEDURE — 97112 NEUROMUSCULAR REEDUCATION: CPT

## 2020-01-13 NOTE — THERAPY TREATMENT NOTE
"    Outpatient Physical Therapy Neuro Treatment Note  [facility]     Patient Name: Baltazar MELO  : 1950  MRN: [id number]  Today's Date: 2020      Visit Date: 2020    Visit Dx:    ICD-10-CM ICD-9-CM   1. Muscle weakness (generalized) M62.81 728.87   2. Functional gait abnormality R26.89 781.2   3. History of lumbar laminectomy for spinal cord decompression Z98.890 V45.89       Patient Active Problem List   Diagnosis   • Hypertension   • Morbid obesity with BMI of 40.0-44.9, adult (CMS/HCC)   • Lumbar back pain with radiculopathy affecting right lower extremity   • Diabetes mellitus (CMS/HCC)   • Renal lesion   • Paroxysmal atrial fibrillation (CMS/HCC)   • Post-operative state   • Weakness of both lower extremities           PT Neuro     Row Name 20 1100             Subjective Comments    Subjective Comments  \"I ordered that walker. It should be in this week.\" \"I am really excited I did so well on the steps.\" (7\" steps with 2 rails)  -LB         Precautions and Contraindications    Precautions  Latex Allergy , spinal surgery 19 GREAT difficulty with the CURB on IE   -LB         Subjective Pain    Able to rate subjective pain?  yes  -LB      Pre-Treatment Pain Level  0  -LB      Post-Treatment Pain Level  0  -LB         Cognition    Overall Cognitive Status  WFL  -LB      Memory  Appears intact  -LB      Orientation Level  Oriented X4  -LB      Safety Judgment  Good awareness of safety precautions  -LB         Posture/Observations    Alignment Options  Forward head  -LB      Forward Head  Mild  -LB      Observations  Edema minimal swelling bilateral legs   -LB         Bed Mobility Assessment/Treatment    Comment (Bed Mobility)  not performed  -LB         Transfers    Sit-Stand [place] (Transfers)  stand by assist from armless chair   -LB      Stand-Sit [place] (Transfers)  stand by assist to armless chair  -LB      Transfers, Sit-Stand-Sit, Assist Device  rolling walker " armelss chair  -LB      Transfer, Safety Issues  weight-shifting ability decreased;balance decreased during turns  -LB      Comment (Transfers)  TRANSFERS to and from the Drive C-Lever rollator with SBA of  and verbal cues from PT.  -LB         Gait/Stairs Assessment/Training    [place] Level (Gait)  contact guard  -LB      Assistive Device (Gait)  AFO;walker, 4-wheeled  -LB      Distance in Feet (Gait)  100' with AFO, 80' without AFO   -LB      Pattern (Gait)  step-through  -LB      Deviations/Abnormal Patterns (Gait)  gait speed decreased;stride length decreased  -LB      Bilateral Gait Deviations  forward flexed posture  -LB      Left Sided Gait Deviations  weight shift ability decreased  -LB      Right Sided Gait Deviations  weight shift ability decreased  -LB      [place] Level (Stairs)  contact guard;minimum assist (75% patient effort);2 person assist;verbal cues  -LB      Handrail Location (Stairs)  left side (ascending);right side (ascending)  -LB      Number of Steps (Stairs)  4  -LB      Ascending Technique (Stairs)  step-to-step leading with the R LE, labored   -LB      Descending Technique (Stairs)  step-to-step leading with L LE  -LB      [place] Level (Ramp)  not tested  -LB      Comment (Gait/Stairs)  STRONG verbal cues to push thru UE's verses nakul up on the rail   -LB         Balance Skills Training    Standing-Level of Assistance  Contact guard  -LB      Static Standing Balance Support  Right upper extremity supported;Left upper extremity supported;parallel bars  -LB      Standing-Balance Activities  Semi-tandum;Compliant surfaces;Standing on 1/2 roll  -LB      Gait Balance-Level of Assistance  Contact guard  -LB      Gait Balance Support  Right upper extremity supported;Left upper extremity supported;parallel bars  -LB      Gait Balance Activities  side-stepping 10' x 2   -LB         Orthotic/Prosthetic Management    Orthosis Location  AFO (ankle foot orthosis)  -LB          "Ankle Foot Orthosis Management    Type (Ankle/Foot Orthosis)  --  posterior lateral strut AFO (Thuasne  -LB      Wearing Schedule (Ankle Foot Orthosis)  wear with activity/work  -LB      Orthosis Training (Ankle Foot Orthosis)  patient and caregiver  -LB      Compliance/Wearing Issues (Ankle Foot Orthosis)  patient/caregiver comprehend strategies  -LB        User Key  (r) = Recorded By, (t) = Taken By, (c) = Cosigned By    Initials Name Provider Type    Nalini Becker PT Physical Therapist                  PT Assessment/Plan     Row Name 01/13/20 1215          PT Assessment    Assessment Comments  Pt was able to ascend and descend four 7\" steps with 2 rails. Ascending the steps took muych more effort for the pt. Pt's steps at home into her kitchen are 8\" in height and have only one rail.   -LB       User Key  (r) = Recorded By, (t) = Taken By, (c) = Cosigned By    Initials Name Provider Type    Nalini Becker, PT Physical Therapist             OP Exercises     Row Name 01/13/20 1100             Subjective Comments    Subjective Comments  \"I ordered that walker. It should be in this week.\" \"I am really excited I did so well on the steps.\" (7\" steps with 2 rails)  -LB         Subjective Pain    Able to rate subjective pain?  yes  -LB      Pre-Treatment Pain Level  0  -LB      Post-Treatment Pain Level  0  -LB         Exercise 1    Exercise Name 1  gastroc stretch on incline at end of ll bars with B UE support wearing her shoes w/o orthotic or AFO performed before balance activities  -LB      Cueing 1  Verbal;Tactile  -LB      Time 1  60  -LB         Exercise 3    Exercise Name 3  AROM / RROM  bilateral ankles in all planes   -LB      Cueing 3  Verbal;Tactile  -LB      Reps 3  10  -LB         Exercise 4    Exercise Name 4  bilateral plantarflexion while standing on the incline at the end of the ll bars with strong pressure thru UE's   -LB      Cueing 4  Verbal;Tactile CGA   -LB      Reps 4  10  -LB      " Additional Comments  limited range ~ 1/4   -LB        User Key  (r) = Recorded By, (t) = Taken By, (c) = Cosigned By    Initials Name Provider Type    Nalini Becker, PT Physical Therapist                          Therapy Education  Education Details: Stairs to two rails with emphasis on pushing thru the rails verses pulling on the rails. Ascending and descending a curb with the rollator.   Given: Mobility training  How Provided: Verbal  Provided to: Patient, Caregiver  Level of Understanding: Teach back education performed, Verbalized, Demonstrated              Time Calculation:   Start Time: 1100  Stop Time: 1144  Time Calculation (min): 44 min  Total Timed Code Minutes- PT: 44 minute(s)   Therapy Charges for Today     Code Description Service Date Service Provider Modifiers Qty    73949788332 HC PT NEUROMUSC RE EDUCATION EA 15 MIN 1/13/2020 Nalini Mccall, PT GP 3                    Nalini Mccall, PT  1/13/2020

## 2020-01-17 ENCOUNTER — HOSPITAL ENCOUNTER (OUTPATIENT)
Dept: PHYSICAL THERAPY | Facility: HOSPITAL | Age: 70
Setting detail: THERAPIES SERIES
Discharge: HOME | End: 2020-01-17

## 2020-01-17 DIAGNOSIS — M62.81 MUSCLE WEAKNESS (GENERALIZED): Primary | ICD-10-CM

## 2020-01-17 DIAGNOSIS — Z98.890 HISTORY OF LUMBAR LAMINECTOMY FOR SPINAL CORD DECOMPRESSION: ICD-10-CM

## 2020-01-17 DIAGNOSIS — R26.89 FUNCTIONAL GAIT ABNORMALITY: ICD-10-CM

## 2020-01-17 PROCEDURE — 97112 NEUROMUSCULAR REEDUCATION: CPT

## 2020-01-17 NOTE — THERAPY PROGRESS REPORT/RE-CERT
"    Outpatient Physical Therapy Neuro Progress Note  [facility]     Patient Name: Baltazar MELO  : 1950  MRN: [id number]  Today's Date: 2020      Visit Date: 2020    Visit Dx:    ICD-10-CM ICD-9-CM   1. Muscle weakness (generalized) M62.81 728.87   2. Functional gait abnormality R26.89 781.2   3. History of lumbar laminectomy for spinal cord decompression Z98.890 V45.89       Patient Active Problem List   Diagnosis   • Hypertension   • Morbid obesity with BMI of 40.0-44.9, adult (CMS/HCC)   • Lumbar back pain with radiculopathy affecting right lower extremity   • Diabetes mellitus (CMS/HCC)   • Renal lesion   • Paroxysmal atrial fibrillation (CMS/HCC)   • Post-operative state   • Weakness of both lower extremities           PT Neuro     Row Name 20 0930             Subjective Comments    Subjective Comments  \"I want to be able to walk out of here and get rid of this wheelchair.\" \"I feel comfortable with the walker ([address])   -LB         Precautions and Contraindications    Precautions  Latex Allergy , spinal surgery 19 GREAT difficulty with the CURB on IE   -LB         Subjective Pain    Able to rate subjective pain?  yes  -LB      Pre-Treatment Pain Level  0  -LB      Post-Treatment Pain Level  0  -LB         Cognition    Overall Cognitive Status  WFL  -LB      Memory  Appears intact  -LB      Orientation Level  Oriented X4  -LB      Safety Judgment  Good awareness of safety precautions  -LB      Deficits  Fully aware of deficits  -LB         MMT Right Lower Ext    Rt Hip Flexion MMT, Gross Movement  (4+/5) good plus  -LB      Rt Hip Extension MMT, Gross Movement  -- not tested  -LB      Rt Hip ABduction MMT, Gross Movement  (3-/5) fair minus  -LB      Rt Knee Extension MMT, Gross Movement  (5/5) normal  -LB      Rt Knee Flexion MMT, Gross Movement  -- maximal resistance in sitting   -LB      Rt Ankle Plantarflexion MMT, Gross Movement  other (see comments) moderate " resistance in sitting   -LB      Rt Ankle Dorsiflexion MMT, Gross Movement  (5/5) normal  -LB      Rt Ankle Subtalar Inversion MT, Gross Movement  (5/5) normal  -LB      Rt Ankle Subtalar Eversion MMT, Gross Movement  (5/5) normal  -LB      Rt MTP Flexion MMT, Gross Movement  (3+/5) fair plus  -LB      Rt MTP Extension MMT, Gross Movement  (3+/5) fair plus  -LB         MMT Left Lower Ext    Lt Hip Flexion MMT, Gross Movement  (4+/5) good plus  -LB      Lt Hip Extension MMT, Gross Movement  -- not tested  -LB      Lt Hip ABduction MMT, Gross Movement  (3-/5) fair minus  -LB      Lt Knee Extension MMT, Gross Movement  (5/5) normal  -LB      Lt Knee Flexion MMT, Gross Movement  -- moderate resistance in sitting   -LB      Lt Ankle Plantarflexion MMT, Gross Movement  -- moderate resistance in sitting   -LB      Lt Ankle Dorsiflexion MMT, Gross Movement  (4/5) good  -LB      Lt Ankle Subtalar Inversion MMT, Gross Movement  (4/5) good  -LB      Lt Ankle Subtalar Eversion MMT, Gross Movement  (3/5) fair  -LB      Lt MTP Flexion MMT, Gross Movement  (2/5) poor  -LB      Lt MTP Extension MMT, Gross Movement  (2/5) poor  -LB         Bed Mobility Assessment/Treatment    Rolling Left [place] (Bed Mobility)  independent  -LB      Rolling Right [place] (Bed Mobility)  independent  -LB      Supine-Sit [place] (Bed Mobility)  independent  -LB      Sit-Supine [place] (Bed Mobility)  independent  -LB         Transfers    Sit-Stand [place] (Transfers)  conditional independence from an armless chair  -LB      Stand-Sit [place] (Transfers)  conditional independence to an armless chair  -LB      Transfers, Sit-Stand-Sit, Assist Device  -- [place] Rollator   -LB      Transfer, Safety Issues  weight-shifting ability decreased;balance decreased during turns  -LB         Gait/Stairs Assessment/Training    [place] Level (Gait)  stand by assist;supervision  -LB      Assistive Device (Gait)  AFO;walker,  4-wheeled pt's [place] Lite rollator   -LB      Distance in Feet (Gait)  100' with AFO   -LB      Pattern (Gait)  step-through  -LB      Bilateral Gait Deviations  forward flexed posture slightly   -LB      Right Sided Gait Deviations  -- R LE ER minimaly   -LB      [place] Level (Stairs)  contact guard;2 person assist;verbal cues  -LB      Handrail Location (Stairs)  both sides  -LB      Number of Steps (Stairs)  4  -LB      Ascending Technique (Stairs)  step-to-step leading with R LE   -LB      Descending Technique (Stairs)  step-to-step leading with L LE  -LB      Comment (Gait/Stairs)  see Tinetti   -LB         Balance Skills Training    Balance Comments  see Tinetti  -LB         Orthotic/Prosthetic Management    Orthosis Location  AFO (ankle foot orthosis)  -LB         Ankle Foot Orthosis Management    Wearing Schedule (Ankle Foot Orthosis)  wear with activity/work advised pt to wear when ambulating with her rollator  -LB      Orthosis Training (Ankle Foot Orthosis)  patient and caregiver  -LB      Compliance/Wearing Issues (Ankle Foot Orthosis)  patient/caregiver comprehend strategies  -LB        User Key  (r) = Recorded By, (t) = Taken By, (c) = Cosigned By    Initials Name Provider Type    Nalini Becker PT Physical Therapist                  PT Assessment/Plan     Row Name 01/17/20 6354          PT Assessment    Assessment Comments  Pt demonstrating improvement with transfers from an armless chair. PT's time on the TUG improved to 19 seconds. Pt's transfers, gait and balance imporved as indicated by the Tinetti. Pt demonstrated increased strength in her ankles and toes. Pt's hip abductors remain at 3-/5. Pt has advanced to a CLever rollator with good quality of gait. Pt is eager to advance to ambulation with her rollator and be finished with her wheelchair. Pt was advised to advance slowly with ambulation with the rollator and to monitor any back pain. Pt has improved with ascending and descending  "steps (7\") but requires the use of 2 rails. Pt lives in a quad level home and her steps are 8\" and have only one rail. Pt would benefit from skilled PT with emphasis on LE strengthening and advancing to staris with one rail and assistance of 1.   -LB        PT Plan    PT Frequency  2x/week  -LB     Predicted Duration of Therapy Intervention (Therapy Eval)  8 weeks   -LB       User Key  (r) = Recorded By, (t) = Taken By, (c) = Cosigned By    Initials Name Provider Type    Nalini Becker, PT Physical Therapist             OP Exercises     Row Name 01/17/20 0930             Subjective Comments    Subjective Comments  \"I want to be able to walk out of here and get rid of this wheelchair.\" \"I feel comfortable with the walker ([employer])   -LB         Subjective Pain    Able to rate subjective pain?  yes  -LB      Pre-Treatment Pain Level  0  -LB      Post-Treatment Pain Level  0  -LB        User Key  (r) = Recorded By, (t) = Taken By, (c) = Cosigned By    Initials Name Provider Type    Nalini Becker, PT Physical Therapist                      PT OP Goals     Row Name 01/17/20 0930          PT Short Term Goals    STG Date to Achieve  02/14/20  -LB     STG 1  Pt to come to stand from armless chair to rolling walker with both hands on the chair seat.   -LB     STG 1 Progress  Met  -LB     STG 2  Pt to increase strength in her bilateral hip abductors to 3+/5.  -LB     STG 2 Progress  Progressing;Ongoing  -LB     STG 3  Pt to increase strength ankle musculature by 1/2 grade overall.   -LB     STG 3 Progress  Met  -LB     STG 4  Pt to ascend and descend a curb with rolling walker with CGA to minimal of 1.   -LB     STG 4 Progress  Met  -LB     STG 5  Pt to ambulate with ~ 80' x 3 in one session with rolling walker.   -LB     STG 5 Progress  Met  -LB     STG 6  Pt to improve score to 18/28 on the Tinetti to improve balance and reduce risk of falls.   -LB     STG 6 Progress  Met  -LB     STG 7  Pt to be evaluated on " stairs.   -LB     STG 7 Progress  Met  -LB     STG 8  Pt to perform 3 steps with one rail with moderate of PT.  -LB     STG 8 Progress  Progressing;Ongoing  -LB     STG 9  Pt to perform transfers to and from the rollator seat safely and independently.  -LB     STG 9 Progress  Progressing;Ongoing  -LB     STG 9 Progress Comments  Pt requires CGA and verbal cues.  -LB     STG 10  Pt to ascend and descend a ramp with the C-Lever rollator with SBA.   -LB     STG 10 Progress  Progressing;Ongoing  -LB     STG 10 Progress Comments  Pt requires CGA and verbal cues.   -LB        Long Term Goals    LTG Date to Achieve  03/09/20  -LB     LTG 1  Pt to increase strength in her bilateral hip abductors to 4/5.  -LB     LTG 1 Progress  Ongoing  -LB     LTG 2  Pt to increase strength ankle musculature by 1 grade overall  -LB     LTG 2 Progress  Progressing;Ongoing  -LB     LTG 3  Pt to improve score to 21/28 on the Tinetti to improve balance and reduce risk of falls.   -LB     LTG 3 Progress  Met  -LB     LTG 4  Pt to ambulate ~ 200' x 3 conditional independent with least restrictive device  -LB     LTG 4 Progress  Progressing;Ongoing  -LB     LTG 5  Pt to perform knee flexion in sitting with maximal resistance.   -LB     LTG 5 Progress  Progressing;Ongoing  -LB     LTG 6  Pt to ambulate with increased wt shift forward and increased pushoff   -LB     LTG 6 Progress  Progressing;Ongoing  -LB     LTG 6 Progress Comments  Good weight shift noted , decreased pushoff bilaterally   -LB     LTG 7  Pt to ascend and descend a curb conditional independent with least restrictive device.   -LB     LTG 7 Progress  Progressing;Ongoing  -LB     LTG 8  Pt to be independent with HEP with emphasis on LE strengthening.   -LB     LTG 9  Pt to complete the TUG in < 14 seconds with least restricitve device.   -LB     LTG 9 Progress  Progressing;Ongoing  -LB     LTG 9 Progress Comments  19 seconds   -LB     LTG 10  Pt to ascend and descend 3 steps with a  "rail with moderate assistancei of her .   -LB     LTG 10 Progress  Ongoing  -LB        Time Calculation    PT Goal Re-Cert Due Date  02/14/20  -LB       User Key  (r) = Recorded By, (t) = Taken By, (c) = Cosigned By    Initials Name Provider Type    Nalini Becker, PT Physical Therapist          Therapy Education  Education Details: Advised pt to start with ambulation in her home with the rollator and advance to only one store verses trying to walk the mall as pt mentioned at beginning of session. Advised pt to continue wearing her AFO with ambulation.  Given: HEP(hip abduction in hooklying with red theraband as resistance)  How Provided: Verbal, Demonstration  Provided to: Patient, Caregiver  Level of Understanding: Teach back education performed, Verbalized, Demonstrated       Tinetti Assessment  Tinetti Assessment: yes  Sitting Balance: Steady,safe  Arises: Able, uses arms  Attempts to Rise: Able in 1 attempt  Immediate Standing Balance (first 5 sec): Steady without support  Standing Balance: Narrow stance, without support  Sternal Nudge (feet close together): Steady  Eyes Closed (feet close together): Steady  Turning 360 Degrees- Steps: Discontinuous steps  Turning 360 Degrees- Steadiness: Unsteady (staggers, grabs)  Sitting Down: Safe, smooth motion  Tinetti Balance Score: 13  Gait Initiation (immediate after told \"go\"): No hesitancy  Step Length- Right Swing: Right swing foot passes Left stance leg  Step Length- Left Swing: Left swing foot passes Right  Foot Clearance- Right Foot: Right foot completely clears floor  Foot Clearance- Left Foot: Left foot completely clears floor  Step Symmetry: Right and Left step length equal  Step Continuity: Steps appear continuous  Path (excursion): Mild/moderate deviation or use of aid  Trunk: Marked sway or uses device  Base of Support: Heels close while walking  Gait Score: 9  Tinetti Total Score: 22  Tinetti Assistive Device: ([place] Rollator)  Jocelynn " Assessment Comments: with her AFO   Timed Up and Go (TUG)  TUG Test 1: 19 seconds  TUG Test 2: 18 seconds  Timed Up and Go Comments: [place] rollator with AFO       Time Calculation:   Start Time: 0930  Stop Time: 1015  Time Calculation (min): 45 min  Total Timed Code Minutes- PT: 45 minute(s)   Therapy Charges for Today     Code Description Service Date Service Provider Modifiers Qty    06139712053 HC PT NEUROMUSC RE EDUCATION EA 15 MIN 1/17/2020 Nalini Mccall, PT GP 3          PT G-Codes  Tinetti Total Score: 22  TUG Test 1: 19 seconds  TUG Test 2: 18 seconds         Nalini Mccall, PT  1/17/2020

## 2020-01-20 ENCOUNTER — HOSPITAL ENCOUNTER (OUTPATIENT)
Dept: PHYSICAL THERAPY | Facility: HOSPITAL | Age: 70
Setting detail: THERAPIES SERIES
Discharge: HOME | End: 2020-01-20

## 2020-01-20 DIAGNOSIS — M48.061 STENOSIS OF LATERAL RECESS OF LUMBAR SPINE: ICD-10-CM

## 2020-01-20 DIAGNOSIS — R26.89 FUNCTIONAL GAIT ABNORMALITY: ICD-10-CM

## 2020-01-20 DIAGNOSIS — Z98.890 HISTORY OF LUMBAR LAMINECTOMY FOR SPINAL CORD DECOMPRESSION: ICD-10-CM

## 2020-01-20 DIAGNOSIS — M62.81 MUSCLE WEAKNESS (GENERALIZED): Primary | ICD-10-CM

## 2020-01-20 PROCEDURE — 97112 NEUROMUSCULAR REEDUCATION: CPT

## 2020-01-20 NOTE — THERAPY TREATMENT NOTE
"    Outpatient Physical Therapy Neuro Treatment Note  [facility]     Patient Name: Baltazar MELO  : 1950  MRN: [id number]  Today's Date: 2020      Visit Date: 2020    Visit Dx:    ICD-10-CM ICD-9-CM   1. Muscle weakness (generalized) M62.81 728.87   2. Functional gait abnormality R26.89 781.2   3. History of lumbar laminectomy for spinal cord decompression Z98.890 V45.89   4. Stenosis of lateral recess of lumbar spine M48.061 724.02       Patient Active Problem List   Diagnosis   • Hypertension   • Morbid obesity with BMI of 40.0-44.9, adult (CMS/HCC)   • Lumbar back pain with radiculopathy affecting right lower extremity   • Diabetes mellitus (CMS/HCC)   • Renal lesion   • Paroxysmal atrial fibrillation (CMS/HCC)   • Post-operative state   • Weakness of both lower extremities           PT Neuro     Row Name 20 1100             Subjective Comments    Subjective Comments  \"I was unable to fit my new walker in the bathroom due to the tub seat.\" \"I walked up here today with it (rollator).\"   -LB         Precautions and Contraindications    Precautions  Latex Allergy , spinal surgery 19 GREAT difficulty with the CURB on IE   -LB         Subjective Pain    Able to rate subjective pain?  yes  -LB      Pre-Treatment Pain Level  0  -LB      Post-Treatment Pain Level  0  -LB         Cognition    Overall Cognitive Status  WFL  -LB      Memory  Appears intact  -LB      Orientation Level  Oriented X4  -LB      Safety Judgment  Good awareness of safety precautions  -LB      Deficits  Fully aware of deficits  -LB         Posture/Observations    Alignment Options  Forward head  -LB      Forward Head  Mild  -LB      Observations  Edema minimal swelling bilateral legs   -LB      Posture/Observations Comments  Pt arrived to PT ambulating with her new rollator.   -LB         Bed Mobility Assessment/Treatment    Comment (Bed Mobility)  not performed   -LB         Transfers    Sit-Stand [place] " "(Transfers)  conditional independence from armed chair   -LB      Stand-Sit [place] (Transfers)  conditional independence to armed chair  -LB      Transfers, Sit-Stand-Sit, Assist Device  -- [place] Rollator   -LB      Comment (Transfers)  PT recommends light use of UE's with to stand to reduce stress on her back  -LB         Gait/Stairs Assessment/Training    [place] Level (Gait)  supervision  -LB      Assistive Device (Gait)  AFO;walker, 4-wheeled pt's [place] Lite rollator   -LB      Distance in Feet (Gait)  120' with AFO   -LB      Pattern (Gait)  step-through  -LB      Bilateral Gait Deviations  forward flexed posture slightly  -LB      Right Sided Gait Deviations  --  R LE ER minimaly   -LB      [place] Level (Stairs)  not tested  -LB      Comment (Gait/Stairs)  within ll bars pt advanced to stepups onto 6 1/2\" step (see exercise)   -LB         Balance Skills Training    Standing-Level of Assistance  Contact guard  -LB      Static Standing Balance Support  Right upper extremity supported;Left upper extremity supported;parallel bars  -LB      Standing-Balance Activities  Semi-tandum;Compliant surfaces;Standing on 1/2 roll  -LB      Gait Balance-Level of Assistance  Contact guard  -LB      Gait Balance Support  Right upper extremity supported;Left upper extremity supported;parallel bars  -LB      Gait Balance Activities  side-stepping;backwards 10' x 3 each direction   -LB         Orthotic/Prosthetic Management    Orthosis Location  AFO (ankle foot orthosis)  -LB         Ankle Foot Orthosis Management    Type (Ankle/Foot Orthosis)  -- posterior lateral strut AFO ([place]  -LB      Wearing Schedule (Ankle Foot Orthosis)  wear with activity/work  -LB      Orthosis Training (Ankle Foot Orthosis)  patient and caregiver  -LB        User Key  (r) = Recorded By, (t) = Taken By, (c) = Cosigned By    Initials Name Provider Type    LB Nalini Mccall, PT Physical Therapist                  PT Assessment/Plan  " "   Row Name 01/20/20 1252          PT Assessment    Assessment Comments  Pt arrived to PT ambulating with her rollator verses her wheelchair for the first time. Pt was jessica to perform step ups within the ll bars with bilateral UE to a 6 1/2\" step verses a 4 \" step.   -LB       User Key  (r) = Recorded By, (t) = Taken By, (c) = Cosigned By    Initials Name Provider Type    Nalini Becker PT Physical Therapist             OP Exercises     Row Name 01/20/20 1100             Subjective Comments    Subjective Comments  \"I was unable to fit my new walker in the bathroom due to the tub seat.\" \"I walked up here today with it (rollator).\"   -LB         Subjective Pain    Able to rate subjective pain?  yes  -LB      Pre-Treatment Pain Level  0  -LB      Post-Treatment Pain Level  0  -LB        User Key  (r) = Recorded By, (t) = Taken By, (c) = Cosigned By    Initials Name Provider Type    Nalini Becker PT Physical Therapist                          Therapy Education  Education Details: Emphasized to start o0ut slowly when walking with the rollator in the community.   How Provided: Verbal  Provided to: Patient  Level of Understanding: Verbalized              Time Calculation:   Start Time: 1100  Stop Time: 1145  Time Calculation (min): 45 min  Total Timed Code Minutes- PT: 45 minute(s)   Therapy Charges for Today     Code Description Service Date Service Provider Modifiers Qty    [id number]  PT NEUROMUSC RE EDUCATION EA 15 MIN 1/20/2020 Nalini Mccall PT GP 3                    Nalini Mccall PT  1/20/2020     "

## 2020-01-23 ENCOUNTER — TELEPHONE (OUTPATIENT)
Dept: NEUROSURGERY | Facility: CLINIC | Age: 70
End: 2020-01-23

## 2020-01-23 NOTE — TELEPHONE ENCOUNTER
Pt had L3-5 decomp on 8/19/19 with Dr Robertson.  She wants to know if she is cleared to drive?   [phone]

## 2020-01-24 ENCOUNTER — HOSPITAL ENCOUNTER (OUTPATIENT)
Dept: PHYSICAL THERAPY | Facility: HOSPITAL | Age: 70
Setting detail: THERAPIES SERIES
Discharge: HOME | End: 2020-01-24

## 2020-01-24 DIAGNOSIS — M62.81 MUSCLE WEAKNESS (GENERALIZED): Primary | ICD-10-CM

## 2020-01-24 DIAGNOSIS — Z98.890 HISTORY OF LUMBAR LAMINECTOMY FOR SPINAL CORD DECOMPRESSION: ICD-10-CM

## 2020-01-24 DIAGNOSIS — R26.89 FUNCTIONAL GAIT ABNORMALITY: ICD-10-CM

## 2020-01-24 DIAGNOSIS — M48.061 STENOSIS OF LATERAL RECESS OF LUMBAR SPINE: ICD-10-CM

## 2020-01-24 PROCEDURE — 97112 NEUROMUSCULAR REEDUCATION: CPT

## 2020-01-24 NOTE — THERAPY TREATMENT NOTE
"    Outpatient Physical Therapy Neuro Treatment Note  [facility]     Patient Name: Baltazar MELO  : 1950  MRN: [id number]  Today's Date: 2020      Visit Date: 2020    Visit Dx:    ICD-10-CM ICD-9-CM   1. Muscle weakness (generalized) M62.81 728.87   2. Functional gait abnormality R26.89 781.2   3. History of lumbar laminectomy for spinal cord decompression Z98.890 V45.89   4. Stenosis of lateral recess of lumbar spine M48.061 724.02       Patient Active Problem List   Diagnosis   • Hypertension   • Morbid obesity with BMI of 40.0-44.9, adult (CMS/HCC)   • Lumbar back pain with radiculopathy affecting right lower extremity   • Diabetes mellitus (CMS/HCC)   • Renal lesion   • Paroxysmal atrial fibrillation (CMS/HCC)   • Post-operative state   • Weakness of both lower extremities           PT Neuro     Row Name 20 0935             Subjective Comments    Subjective Comments  \"I saw Dr. Tapia and he could tell my left ankle was stronger. He said i would have to talk with surgeon's office regarding driving. I called the surgeon's office and they OKed me to drive. I haven't yet but it was able to get in and out of the car on the drivers side.\"  -LB         Precautions and Contraindications    Precautions  Latex Allergy , spinal surgery 19 GREAT difficulty with the CURB on IE   -LB         Subjective Pain    Able to rate subjective pain?  yes  -LB      Pre-Treatment Pain Level  0  -LB      Post-Treatment Pain Level  0  -LB         Cognition    Overall Cognitive Status  WFL  -LB      Memory  Appears intact  -LB      Orientation Level  Oriented X4  -LB      Safety Judgment  Good awareness of safety precautions  -LB      Deficits  Fully aware of deficits  -LB         Posture/Observations    Alignment Options  Forward head  -LB      Forward Head  Mild  -LB      Observations  Edema minimal sweeling in bilateral LE's   -LB      Posture/Observations Comments  Pt arrived to PT ambulating with " her new rollator.   -LB         Transfers    Sit-Stand [place] (Transfers)  conditional independence  -LB      Stand-Sit [place] (Transfers)  conditional independence  -LB      Transfers, Sit-Stand-Sit, Assist Device  -- [place] rollator   -LB         Gait/Stairs Assessment/Training    [place] Level (Gait)  supervision  -LB      Assistive Device (Gait)  AFO;walker, 4-wheeled pt's [place] Lite rollator   -LB      Pattern (Gait)  step-through  -LB      Bilateral Gait Deviations  forward flexed posture slightly   -LB      Right Sided Gait Deviations  -- R LE slightly more ER than L LE  -LB      [place] Level (Stairs)  (S) moderate assist (50% patient effort);verbal cues HHA on the L descending and R ascending CGA of another  -LB      Assistive Device (Stairs)  -- HHA , no device  -LB      Handrail Location (Stairs)  left side (ascending) R side descending to simulate to her kitchen area  -LB      Number of Steps (Stairs)  3  -LB      Ascending Technique (Stairs)  step-to-step leading with  R LE   -LB      Descending Technique (Stairs)  step-to-step leading with L LE   -LB      Comment (Gait/Stairs)  Pt demonstrating more difficulty with ascending the steps. Pt was ableto lift herself up the step with less stress than previous time.   -LB         Balance Skills Training    Standing-Level of Assistance  Contact guard  -LB      Static Standing Balance Support  Right upper extremity supported;Left upper extremity supported;parallel bars  -LB      Standing-Balance Activities  Semi-tandum;Compliant surfaces;Standing on 1/2 roll  -LB      Gait Balance-Level of Assistance  Contact guard  -LB      Gait Balance Support  Right upper extremity supported;Left upper extremity supported;parallel bars  -LB      Gait Balance Activities  side-stepping;backwards 10' x 4 each direction slowly  -LB         Orthotic/Prosthetic Management    Orthosis Location  AFO (ankle foot orthosis)  -LB         Ankle Foot Orthosis  "Management    Type (Ankle/Foot Orthosis)  -- posterior lateral strut AFO (Thuasne  -LB      Wearing Schedule (Ankle Foot Orthosis)  wear with activity/work  -LB      Orthosis Training (Ankle Foot Orthosis)  patient and caregiver  -LB        User Key  (r) = Recorded By, (t) = Taken By, (c) = Cosigned By    Initials Name Provider Type    Nalini Becker, PT Physical Therapist                  PT Assessment/Plan     Row Name 01/24/20 1251          PT Assessment    Assessment Comments  Pt reporting Dr. Tapia noticed her increased L ankle strength and her surgeon gave her the OK to drive. Pt was able to ascend and descend the steps with moderate HHA with less effort ascending as in previous time on the steps with one rail.   -LB       User Key  (r) = Recorded By, (t) = Taken By, (c) = Cosigned By    Initials Name Provider Type    Nalini Becker, PT Physical Therapist             OP Exercises     Row Name 01/24/20 0919             Subjective Comments    Subjective Comments  \"I saw Dr. Tapia and he could tell my left ankle was stronger. He said i would have to talk with surgeon's office regarding driving. I called the surgeon's office and they OKed me to drive. I haven't yet but it was able to get in and out of the car on the drivers side.\"  -LB         Subjective Pain    Able to rate subjective pain?  yes  -LB      Pre-Treatment Pain Level  0  -LB      Post-Treatment Pain Level  0  -LB         Exercise 7    Exercise Name 7  knee flexion each LE in standing within ll bars with minimal tactile cues to fully shift to the L LE  -LB      Cueing 7  Verbal;Tactile  -LB      Reps 7  8 each LE   -LB         Exercise 9    Exercise Name 9  Sidestepping with knees flexed ~ 10 degrees with strong CGA   -LB      Cueing 9  Verbal;Tactile  -LB      Reps 9  10' each direction   -LB        User Key  (r) = Recorded By, (t) = Taken By, (c) = Cosigned By    Initials Name Provider Type    Nalini Becker PT Physical Therapist    "                       Therapy Education  Education Details: Instructed pt in proper HHA while performing steps. Pt was advised not to try her steps at home at this time.   Given: Mobility training, Fall prevention and home safety  How Provided: Verbal, Demonstration  Provided to: Patient, Caregiver(Pt's  observed. )  Level of Understanding: Teach back education performed, Verbalized, Demonstrated              Time Calculation:   Start Time: 0935  Stop Time: 1021  Time Calculation (min): 46 min  Total Timed Code Minutes- PT: 46 minute(s)   Therapy Charges for Today     Code Description Service Date Service Provider Modifiers Qty    34366755585 HC PT NEUROMUSC RE EDUCATION EA 15 MIN 1/24/2020 Nalini Mccall, PT GP 3                    Nalini Mccall, PT  1/24/2020

## 2020-01-27 ENCOUNTER — HOSPITAL ENCOUNTER (OUTPATIENT)
Dept: PHYSICAL THERAPY | Facility: HOSPITAL | Age: 70
Setting detail: THERAPIES SERIES
Discharge: HOME | End: 2020-01-27

## 2020-01-27 DIAGNOSIS — M48.061 STENOSIS OF LATERAL RECESS OF LUMBAR SPINE: ICD-10-CM

## 2020-01-27 DIAGNOSIS — M62.81 MUSCLE WEAKNESS (GENERALIZED): Primary | ICD-10-CM

## 2020-01-27 DIAGNOSIS — Z98.890 HISTORY OF LUMBAR LAMINECTOMY FOR SPINAL CORD DECOMPRESSION: ICD-10-CM

## 2020-01-27 DIAGNOSIS — R26.89 FUNCTIONAL GAIT ABNORMALITY: ICD-10-CM

## 2020-01-27 PROCEDURE — 97112 NEUROMUSCULAR REEDUCATION: CPT

## 2020-01-27 NOTE — THERAPY TREATMENT NOTE
"    Outpatient Physical Therapy Neuro Treatment Note  [facility]     Patient Name: Baltazar MELO  : 1950  MRN: [id number]  Today's Date: 2020      Visit Date: 2020    Visit Dx:    ICD-10-CM ICD-9-CM   1. Muscle weakness (generalized) M62.81 728.87   2. Functional gait abnormality R26.89 781.2   3. History of lumbar laminectomy for spinal cord decompression Z98.890 V45.89   4. Stenosis of lateral recess of lumbar spine M48.061 724.02       Patient Active Problem List   Diagnosis   • Hypertension   • Morbid obesity with BMI of 40.0-44.9, adult (CMS/HCC)   • Lumbar back pain with radiculopathy affecting right lower extremity   • Diabetes mellitus (CMS/HCC)   • Renal lesion   • Paroxysmal atrial fibrillation (CMS/HCC)   • Post-operative state   • Weakness of both lower extremities           PT Neuro     Row Name 20 1100             Subjective Comments    Subjective Comments  \"I have been using the walker exclusively.\" \"We have 2 steps onto the porch but no handrails. \"  -LB         Precautions and Contraindications    Precautions  Latex Allergy , spinal surgery 19 GREAT difficulty with the CURB on IE   -LB         Subjective Pain    Able to rate subjective pain?  yes  -LB      Pre-Treatment Pain Level  0  -LB      Post-Treatment Pain Level  0  -LB         Cognition    Overall Cognitive Status  WFL  -LB      Memory  Appears intact  -LB      Orientation Level  Oriented X4  -LB      Safety Judgment  Good awareness of safety precautions  -LB      Deficits  Fully aware of deficits  -LB         Posture/Observations    Alignment Options  Forward head  -LB      Forward Head  Mild  -LB      Observations  Edema minimal swelling in both feet   -LB         Transfers    Sit-Stand [place] (Transfers)  conditional independence  -LB      Stand-Sit [place] (Transfers)  conditional independence  -LB      Transfers, Sit-Stand-Sit, Assist Device  -- [place] rollator   -LB         Gait/Stairs " Assessment/Training    [place] Level (Gait)  supervision  -LB      Assistive Device (Gait)  AFO;walker, 4-wheeled pt's [place] Lite rollator  -LB      Distance in Feet (Gait)  60' with AFO in place, 100' without AFO   -LB      Pattern (Gait)  step-through  -LB      Bilateral Gait Deviations  forward flexed posture slightly   -LB      Right Sided Gait Deviations  -- R LE slightly more ER than L LE  -LB      [place] Level (Stairs)  (S) moderate assist (50% patient effort);2 person assist HHA of her , 2nd person with minimal ascending  -LB      Assistive Device (Stairs)  -- HHA no device  -LB      Handrail Location (Stairs)  left side (ascending) R side descending to simulate to her kitchen area  -LB      Ascending Technique (Stairs)  step-to-step leading with R LE  -LB      Descending Technique (Stairs)  step-to-step leading with L LE  -LB      Comment (Gait/Stairs)  Pt pulling on rail strongly on the last step. DISCUSSED in detail possible options for entering her home thru the front door where 2 steps on the porch with no rails.    -LB         Balance Skills Training    Standing-Level of Assistance  Contact guard  -LB      Static Standing Balance Support  Right upper extremity supported;Left upper extremity supported;parallel bars  -LB      Standing-Balance Activities  Semi-tandum;Compliant surfaces;Standing on 1/2 roll without her AFO   -LB      Gait Balance-Level of Assistance  Contact guard  -LB      Gait Balance Support  Right upper extremity supported;Left upper extremity supported;parallel bars  -LB      Gait Balance Activities  side-stepping 10' each direction x 3  -LB         Orthotic/Prosthetic Management    Orthosis Location  AFO (ankle foot orthosis)  -LB         Ankle Foot Orthosis Management    Type (Ankle/Foot Orthosis)  -- posterior lateral strut AFO (Thuasne  -LB      Wearing Schedule (Ankle Foot Orthosis)  wear with activity/work  -LB      Orthosis Training (Ankle Foot Orthosis)   "patient and caregiver  -LB        User Key  (r) = Recorded By, (t) = Taken By, (c) = Cosigned By    Initials Name Provider Type    Nalini Becker PT Physical Therapist                  PT Assessment/Plan     Row Name 01/27/20 1220          PT Assessment    Assessment Comments  Pt and her  were instructed in ascending and descending 3 steps with moderate HHA. Strong verbal cues were required and PT had to provide additional minimal with ascending. Pt and PT discussing the entrance to the front door as an option as pt has greatest difficulty ascending the steps.   -LB       User Key  (r) = Recorded By, (t) = Taken By, (c) = Cosigned By    Initials Name Provider Type    Nalini Becker, PT Physical Therapist             OP Exercises     Row Name 01/27/20 1100             Subjective Comments    Subjective Comments  \"I have been using the walker exclusively.\" \"We have 2 steps onto the porch but no handrails. \"  -LB         Subjective Pain    Able to rate subjective pain?  yes  -LB      Pre-Treatment Pain Level  0  -LB      Post-Treatment Pain Level  0  -LB         Exercise 1    Exercise Name 1  gastroc stretch on incline at end of ll bars with B UE support wearing her shoes w/o orthotic or AFO performed before balance activities  -LB      Cueing 1  Verbal;Tactile  -LB      Time 1  60  -LB         Exercise 4    Exercise Name 4  bilateral plantarflexion while standing on the incline at the end of the ll bars with strong pressure thru UE's   -LB      Cueing 4  Verbal;Tactile CGA   -LB      Additional Comments  limited range ~ 1/4  -LB         Exercise 7    Exercise Name 7  knee flexion each LE in standing within ll bars   -LB      Cueing 7  Verbal;Tactile  -LB      Reps 7  10 each LE   -LB         Exercise 10    Exercise Name 10  Within ll bars stepping up backwards onto a 4\" step with R LE leading  (attempting to simulate her steps to the porch.  -LB      Cueing 10  Verbal  -LB      Reps 10  2  -LB        User " Key  (r) = Recorded By, (t) = Taken By, (c) = Cosigned By    Initials Name Provider Type    LB Nalini Mccall, PT Physical Therapist                          Therapy Education  Education Details: Instructed pt and  in technique for steps with one rail and moderate HHA with PT providing CGA to minimal. STRONLY advised pt no to t attempt her steps up to her kitchen at this time.   Given: Mobility training  How Provided: Verbal, Demonstration  Provided to: Patient, Caregiver  Level of Understanding: Teach back education performed, Demonstrated, Verbalized              Time Calculation:   Start Time: 1100  Stop Time: 1145  Time Calculation (min): 45 min  Total Timed Code Minutes- PT: 45 minute(s)   Therapy Charges for Today     Code Description Service Date Service Provider Modifiers Qty    98223793630 HC PT NEUROMUSC RE EDUCATION EA 15 MIN 1/27/2020 Nalini Mccall, PT GP 3                    Nalini Mccall, PT  1/27/2020

## 2020-01-31 ENCOUNTER — HOSPITAL ENCOUNTER (OUTPATIENT)
Dept: PHYSICAL THERAPY | Facility: HOSPITAL | Age: 70
Setting detail: THERAPIES SERIES
Discharge: HOME | End: 2020-01-31

## 2020-01-31 DIAGNOSIS — R26.89 FUNCTIONAL GAIT ABNORMALITY: ICD-10-CM

## 2020-01-31 DIAGNOSIS — Z98.890 HISTORY OF LUMBAR LAMINECTOMY FOR SPINAL CORD DECOMPRESSION: ICD-10-CM

## 2020-01-31 DIAGNOSIS — M48.061 STENOSIS OF LATERAL RECESS OF LUMBAR SPINE: ICD-10-CM

## 2020-01-31 DIAGNOSIS — M62.81 MUSCLE WEAKNESS (GENERALIZED): Primary | ICD-10-CM

## 2020-01-31 PROCEDURE — 97112 NEUROMUSCULAR REEDUCATION: CPT

## 2020-02-03 ENCOUNTER — HOSPITAL ENCOUNTER (OUTPATIENT)
Dept: PHYSICAL THERAPY | Facility: HOSPITAL | Age: 70
Setting detail: THERAPIES SERIES
Discharge: HOME | End: 2020-02-03

## 2020-02-03 DIAGNOSIS — M48.061 STENOSIS OF LATERAL RECESS OF LUMBAR SPINE: ICD-10-CM

## 2020-02-03 DIAGNOSIS — Z98.890 HISTORY OF LUMBAR LAMINECTOMY FOR SPINAL CORD DECOMPRESSION: ICD-10-CM

## 2020-02-03 DIAGNOSIS — R26.89 FUNCTIONAL GAIT ABNORMALITY: ICD-10-CM

## 2020-02-03 DIAGNOSIS — M62.81 MUSCLE WEAKNESS (GENERALIZED): Primary | ICD-10-CM

## 2020-02-03 PROCEDURE — 97112 NEUROMUSCULAR REEDUCATION: CPT

## 2020-02-03 NOTE — THERAPY TREATMENT NOTE
"    Outpatient Physical Therapy Neuro Treatment Note  [facility]     Patient Name: Baltazar MELO  : 1950  MRN: [id number]  Today's Date: 2/3/2020      Visit Date: 2020    Visit Dx:    ICD-10-CM ICD-9-CM   1. Muscle weakness (generalized) M62.81 728.87   2. Functional gait abnormality R26.89 781.2   3. History of lumbar laminectomy for spinal cord decompression Z98.890 V45.89   4. Stenosis of lateral recess of lumbar spine M48.061 724.02       Patient Active Problem List   Diagnosis   • Hypertension   • Morbid obesity with BMI of 40.0-44.9, adult (CMS/HCC)   • Lumbar back pain with radiculopathy affecting right lower extremity   • Diabetes mellitus (CMS/HCC)   • Renal lesion   • Paroxysmal atrial fibrillation (CMS/[facility])   • Post-operative state   • Weakness of both lower extremities           PT Neuro     Row Name 20 1100             Subjective Comments    Subjective Comments  \"Here are the measurements of my front steps.\" (first step 3 3/4\" and 2nd step 7 \")   -LB         Precautions and Contraindications    Precautions  Latex Allergy , spinal surgery 19 GREAT difficulty with the CURB on IE   -LB         Subjective Pain    Able to rate subjective pain?  yes  -LB      Pre-Treatment Pain Level  0  -LB      Post-Treatment Pain Level  0  -LB         Home Living    Living Environment Comment  (first step 3 3/4\" and 2nd step 7 \") into pt's front door   -LB         Cognition    Overall Cognitive Status  WFL  -LB      Memory  Appears intact  -LB      Orientation Level  Oriented X4  -LB      Safety Judgment  Good awareness of safety precautions  -LB      Deficits  Fully aware of deficits  -LB         Posture/Observations    Alignment Options  Forward head  -LB      Forward Head  Mild  -LB      Observations  Edema minimal swelling bilateral legs   -LB      Posture/Observations Comments  Pt arrived to PT ambulating with her new rollator.   -LB         Transfers    Sit-Stand [place] " "(Transfers)  conditional independence  -LB      Stand-Sit [place] (Transfers)  conditional independence  -LB      Transfers, Sit-Stand-Sit, Assist Device  -- [place] rollator   -LB      Transfer, Safety Issues  weight-shifting ability decreased;balance decreased during turns  -LB         Gait/Stairs Assessment/Training    [place] Level (Gait)  stand by assist  -LB      Assistive Device (Gait)  AFO;walker, 4-wheeled with and withotu AFO   -LB      Distance in Feet (Gait)  160 with AFO, 60' without AFO   -LB      Pattern (Gait)  step-through  -LB      Bilateral Gait Deviations  forward flexed posture slightly   -LB      Right Sided Gait Deviations  -- R LE more ER than L LE  -LB      [place] Level (Stairs)  contact guard;verbal cues  -LB      Handrail Location (Stairs)  both sides  -LB      Number of Steps (Stairs)  four 7\" steps  -LB      Ascending Technique (Stairs)  step-to-step leading with R LE   -LB      Descending Technique (Stairs)  step-to-step leading wwith L LE   -LB      Stairs, Safety Issues  sequencing ability decreased  -LB      Stairs, Impairments  strength decreased;impaired balance  -LB      Comment (Gait/Stairs)  Simulated pt's front steps -- ascending a 4\" step backwards with a rolling walker, ascended a 4\" step backward and then sat on an armless chair positioned on a 8\" step with CGA to minimal of 1  Discussed many options for pt to ascend her 2 front steps without a rail.   -LB         Balance Skills Training    Standing-Level of Assistance  Contact guard  -LB      Static Standing Balance Support  Right upper extremity supported;Left upper extremity supported;parallel bars  -LB      Standing-Balance Activities  Semi-tandum;Compliant surfaces;Standing on 1/2 roll without AFO   -LB      Gait Balance-Level of Assistance  Contact guard  -LB      Gait Balance Support  Right upper extremity supported;Left upper extremity supported;parallel bars  -LB      Gait Balance Activities  " "side-stepping  -LB         Orthotic/Prosthetic Management    Orthosis Location  AFO (ankle foot orthosis)  -LB         Ankle Foot Orthosis Management    Wearing Schedule (Ankle Foot Orthosis)  wear with activity/work  -LB      Orthosis Training (Ankle Foot Orthosis)  patient and caregiver  -LB        User Key  (r) = Recorded By, (t) = Taken By, (c) = Cosigned By    Initials Name Provider Type    Nalini Becker PT Physical Therapist                  PT Assessment/Plan     Row Name 02/03/20 1252          PT Assessment    Assessment Comments  Pt arrived to PT with the dimensions of her front two steps up onto her porch. PT determined best technique would be to ascend the first small step backwards and place an armless chair on the second step for pt to sit on and the nturn sideways in the chair and stand up to a rolling walker. Pt performed this technique with good control.   -LB       User Key  (r) = Recorded By, (t) = Taken By, (c) = Cosigned By    Initials Name Provider Type    Nalini Becker PT Physical Therapist             OP Exercises     Row Name 02/03/20 1100             Subjective Comments    Subjective Comments  \"Here are the measurements of my front steps.\" (first step 3 3/4\" and 2nd step 7 1/2\")   -LB         Subjective Pain    Able to rate subjective pain?  yes  -LB      Pre-Treatment Pain Level  0  -LB      Post-Treatment Pain Level  0  -LB         Exercise 4    Exercise Name 4  bilateral plantarflexion while standing on the incline at the end of the ll bars with strong pressure thru UE's   -LB      Cueing 4  Verbal;Tactile  -LB      Reps 4  12  -LB        User Key  (r) = Recorded By, (t) = Taken By, (c) = Cosigned By    Initials Name Provider Type    Nalini Becker PT Physical Therapist                          Therapy Education  Education Details: Ascending 2 steps to simulate her front porch (refer to comments under gait)   Given: Mobility training  How Provided: Verbal, " Demonstration  Provided to: Patient, Caregiver(Pt's  observed. )  Level of Understanding: Verbalized, Teach back education performed, Demonstrated              Time Calculation:   Start Time: 1100  Stop Time: 1145  Time Calculation (min): 45 min  Total Timed Code Minutes- PT: 45 minute(s)   Therapy Charges for Today     Code Description Service Date Service Provider Modifiers Qty    45263581348 HC PT NEUROMUSC RE EDUCATION EA 15 MIN 2/3/2020 Nalini Mccall, PT GP 3                    Nalini Mccall, PT  2/3/2020

## 2020-02-07 ENCOUNTER — APPOINTMENT (OUTPATIENT)
Dept: PHYSICAL THERAPY | Facility: HOSPITAL | Age: 70
End: 2020-02-07

## 2020-02-10 ENCOUNTER — HOSPITAL ENCOUNTER (OUTPATIENT)
Dept: PHYSICAL THERAPY | Facility: HOSPITAL | Age: 70
Setting detail: THERAPIES SERIES
Discharge: HOME | End: 2020-02-10

## 2020-02-10 DIAGNOSIS — R26.89 FUNCTIONAL GAIT ABNORMALITY: ICD-10-CM

## 2020-02-10 DIAGNOSIS — M62.81 MUSCLE WEAKNESS (GENERALIZED): Primary | ICD-10-CM

## 2020-02-10 DIAGNOSIS — Z98.890 HISTORY OF LUMBAR LAMINECTOMY FOR SPINAL CORD DECOMPRESSION: ICD-10-CM

## 2020-02-10 DIAGNOSIS — M48.061 STENOSIS OF LATERAL RECESS OF LUMBAR SPINE: ICD-10-CM

## 2020-02-10 PROCEDURE — 97112 NEUROMUSCULAR REEDUCATION: CPT

## 2020-02-10 NOTE — THERAPY PROGRESS REPORT/RE-CERT
"    Outpatient Physical Therapy Neuro Progress Note  [facility]     Patient Name: Baltazar MELO  : 1950  MRN: [id number]  Today's Date: 2/10/2020      Visit Date: 02/10/2020    Visit Dx:    ICD-10-CM ICD-9-CM   1. Muscle weakness (generalized) M62.81 728.87   2. Functional gait abnormality R26.89 781.2   3. History of lumbar laminectomy for spinal cord decompression Z98.890 V45.89   4. Stenosis of lateral recess of lumbar spine M48.061 724.02       Patient Active Problem List   Diagnosis   • Hypertension   • Morbid obesity with BMI of 40.0-44.9, adult (CMS/[facility])   • Lumbar back pain with radiculopathy affecting right lower extremity   • Diabetes mellitus (CMS/[facility])   • Renal lesion   • Paroxysmal atrial fibrillation (CMS/[facility])   • Post-operative state   • Weakness of both lower extremities           PT Neuro     Row Name 02/10/20 1100             Subjective Comments    Subjective Comments  \"I haven't tried my steps. I can tell I am not ready yet.\" \"I really like this walker (rollator)\" Pt reporting she has not been using her wheelchair except for long distances. When ask how her back was toleratingthe increased activity pt reported, \"My back gets a little sore, mostly when I sit down.\"   -LB         Precautions and Contraindications    Precautions  Latex Allergy , spinal surgery 19 GREAT difficulty with the CURB on IE   -LB         Subjective Pain    Able to rate subjective pain?  yes  -LB      Pre-Treatment Pain Level  1  -LB      Post-Treatment Pain Level  1  -LB         Home Living    Living Environment Comment  first step 3 3/4\" and 2nd step 7 \" into pt's front door   -LB         Cognition    Overall Cognitive Status  WFL  -LB      Memory  Appears intact  -LB      Orientation Level  Oriented X4  -LB      Safety Judgment  Good awareness of safety precautions  -LB      Deficits  Fully aware of deficits  -LB         Posture/Observations    Alignment Options  Forward head  -LB      Forward Head  " Mild  -LB      Observations  Edema  -LB      Posture/Observations Comments  Pt arrived to PT ambulating with her rollator.   -LB         MMT Right Lower Ext    Rt Hip Flexion MMT, Gross Movement  (4+/5) good plus  -LB      Rt Hip Extension MMT, Gross Movement  -- maximal resistance in sitting  -LB      Rt Hip ABduction MMT, Gross Movement  (3-/5) fair minus  -LB      Rt Knee Extension MMT, Gross Movement  (5/5) normal  -LB      Rt Knee Flexion MMT, Gross Movement  -- maximal resistance in sitting  -LB      Rt Ankle Plantarflexion MMT, Gross Movement  -- maximal resistance in sitting  -LB      Rt Ankle Dorsiflexion MMT, Gross Movement  (5/5) normal  -LB         MMT Left Lower Ext    Lt Hip Flexion MMT, Gross Movement  (4+/5) good plus  -LB      Lt Hip Extension MMT, Gross Movement  -- maximal resistance in sitting  -LB      Lt Hip ABduction MMT, Gross Movement  (3-/5) fair minus  -LB      Lt Knee Extension MMT, Gross Movement  (5/5) normal  -LB      Lt Knee Flexion MMT, Gross Movement  -- moderate resistance in sitting  -LB      Lt Ankle Plantarflexion MMT, Gross Movement  -- moderate resistance in sitting  -LB      Lt Ankle Dorsiflexion MMT, Gross Movement  (4+/5) good plus  -LB      Lt Ankle Subtalar Inversion MMT, Gross Movement  (4+/5) good plus  -LB      Lt Ankle Subtalar Eversion MMT, Gross Movement  (3+/5) fair plus  -LB      Lt MTP Flexion MMT, Gross Movement  (3+/5) fair plus  -LB      Lt MTP Extension MMT, Gross Movement  (3/5) fair  -LB         Bed Mobility Assessment/Treatment    Rolling Left [place] (Bed Mobility)  independent  -LB      Rolling Right [place] (Bed Mobility)  independent  -LB      Supine-Sit [place] (Bed Mobility)  independent  -LB      Sit-Supine [place] (Bed Mobility)  independent  -LB         Transfers    Sit-Stand [place] (Transfers)  conditional independence  -LB      Stand-Sit [place] (Transfers)  conditional independence  -LB      Transfers,  "Sit-Stand-Sit, Assist Device  -- CLever Rolaltor   -LB      Transfer, Safety Issues  balance decreased during turns  -LB         Gait/Stairs Assessment/Training    Lamar Level (Gait)  supervision  -LB      Assistive Device (Gait)  AFO;walker, 4-wheeled  -LB      Distance in Feet (Gait)  160' x 2 with AFO   -LB      Pattern (Gait)  step-through  -LB      Bilateral Gait Deviations  forward flexed posture  -LB      Right Sided Gait Deviations  -- R LE more ER than L LE  -LB      Negotiation (Stairs)  --  Pt's steps at home are 8\" in ht, handrail ht 32\" to 35\"   -LB      Lamar Level (Stairs)  contact guard;verbal cues  -LB      Handrail Location (Stairs)  both sides  -LB      Number of Steps (Stairs)  four 7\" steps   -LB      Ascending Technique (Stairs)  step-to-step leading with R LE  -LB      Descending Technique (Stairs)  step-to-step leading with L LE  -LB      Stairs, Safety Issues  weight-shifting ability decreased  -LB      Stairs, Impairments  strength decreased;impaired balance  -LB      Lamar Level (Ramp)  not tested  -LB      Comment (Gait/Stairs)  CURB with rollator with CGA  -LB         Balance Skills Training    Standing-Level of Assistance  Independent  -LB      Static Standing Balance Support  assistive device  -LB      Standing-Balance Activities  Reaching for objects  -LB      Gait Balance-Level of Assistance  Contact guard  -LB      Gait Balance Support  Right upper extremity supported;Left upper extremity supported;parallel bars  -LB      Gait Balance Activities  side-stepping  -LB         Orthotic/Prosthetic Management    Orthosis Location  AFO (ankle foot orthosis)  -LB         Ankle Foot Orthosis Management    Type (Ankle/Foot Orthosis)  -- posterior lateral strut AFO (Thuasne  -LB      Wearing Schedule (Ankle Foot Orthosis)  wear with activity/work  -LB      Orthosis Training (Ankle Foot Orthosis)  patient and caregiver  -LB      Compliance/Wearing Issues (Ankle Foot " "Orthosis)  patient/caregiver comprehend strategies  -LB        User Key  (r) = Recorded By, (t) = Taken By, (c) = Cosigned By    Initials Name Provider Type    Nalini Becker PT Physical Therapist                  PT Assessment/Plan     Row Name 02/10/20 1535          PT Assessment    Assessment Comments  Pt has advanced to ambulating into PT with her rollator verses in her manual wheelchair. Pt is also walking short distances in the community with her rollator. Pt demonstrating increased strength in her ankles. Pt's hip abductors remain at a 3-/5. Pt has demonstrated improvement with ascending and descending a curb and a ramp. Pt has been able to ascend and descend 4 steps with two rails with CGA. However, when ascending and descending 4 steps (7\") with one rail pt requires maximal of 1. Pt lives in a quad level home with 8\" steps with one rail. Pt is eager to be able to ascend and descend steps within her home. Pt would benefit from skilled PT with emphasis on bilateral LE strengthening, gait and stair training.    -LB        PT Plan    PT Frequency  2x/week  -LB     Predicted Duration of Therapy Intervention (Therapy Eval)  6 weeks  -LB       User Key  (r) = Recorded By, (t) = Taken By, (c) = Cosigned By    Initials Name Provider Type    Nalini Becker PT Physical Therapist             OP Exercises     Row Name 02/10/20 1100             Subjective Comments    Subjective Comments  \"I haven't tried my steps. I can tell I am not ready yet.\" \"I really like this walker (rollator)\" Pt reporting she has not been using her wheelchair except for long distances. When ask how her back was toleratingthe increased activity pt reported, \"My back gets a little sore, mostly when I sit down.\"   -LB         Subjective Pain    Able to rate subjective pain?  yes  -LB      Pre-Treatment Pain Level  1  -LB      Post-Treatment Pain Level  1  -LB        User Key  (r) = Recorded By, (t) = Taken By, (c) = Cosigned By    Initials " Name Provider Type    Nalini Becker, PT Physical Therapist                      PT OP Goals     Row Name 02/10/20 1100          PT Short Term Goals    STG Date to Achieve  03/09/20  -LB     STG 1  Pt to come to stand from armless chair to rolling walker with both hands on the chair seat.   -LB     STG 1 Progress  Met  -LB     STG 2  Pt to increase strength in her bilateral hip abductors to 3+/5.  -LB     STG 2 Progress  Ongoing  -LB     STG 3  Pt to increase strength ankle musculature by 1/2 grade overall.   -LB     STG 3 Progress  Met  -LB     STG 4  Pt to ascend and descend a curb with rolling walker with CGA to minimal of 1.   -LB     STG 4 Progress  Met  -LB     STG 5  Pt to ambulate with ~ 80' x 3 in one session with rolling walker.   -LB     STG 5 Progress  Met  -LB     STG 6  Pt to improve score to 18/28 on the Tinetti to improve balance and reduce risk of falls.   -LB     STG 6 Progress  Met  -LB     STG 7  Pt to be evaluated on stairs.   -LB     STG 7 Progress  Met  -LB     STG 8  Pt to perform 3 steps with one rail with moderate of PT.  -LB     STG 8 Progress  Progressing;Ongoing  -LB     STG 9  Pt to perform transfers to and from the rollator seat safely and independently.  -LB     STG 9 Progress  Met  -LB     STG 10  Pt to ascend and descend a ramp with the C-Lever rollator with SBA.   -LB     STG 10 Progress  Progressing;Ongoing  -LB     STG 10 Progress Comments  Pt requires CGA.   -LB        Long Term Goals    LTG Date to Achieve  03/23/20  -LB     LTG 1  Pt to increase strength in her bilateral hip abductors to 4/5.  -LB     LTG 1 Progress  Ongoing  -LB     LTG 2  Pt to increase strength ankle musculature by 1 grade overall  -LB     LTG 2 Progress  Met  -LB     LTG 3  Pt to improve score to 21/28 on the Tinetti to improve balance and reduce risk of falls.   -LB     LTG 3 Progress  Met  -LB     LTG 4  Pt to ambulate ~ 200' x 3 conditional independent with least restrictive device  -LB     LTG 4  Progress  Progressing;Ongoing  -LB     LTG 5  Pt to perform knee flexion in sitting with maximal resistance.   -LB     LTG 5 Progress  Progressing;Ongoing  -LB     LTG 6  Pt to ambulate with increased wt shift forward and increased pushoff   -LB     LTG 6 Progress  Progressing;Ongoing  -LB     LTG 7  Pt to ascend and descend a curb conditional independent with least restrictive device.   -LB     LTG 7 Progress  Progressing;Ongoing  -LB     LTG 7 Progress Comments  Pt requires CGA   -LB     LTG 8  Pt to be independent with HEP with emphasis on LE strengthening.   -LB     LTG 8 Progress  Met  -LB     LTG 9  Pt to complete the TUG in < 14 seconds with least restricitve device.   -LB     LTG 9 Progress  Ongoing;Progressing  -LB     LTG 10  Pt to ascend and descend 3 steps with a rail with moderate assistancei of her .   -LB     LTG 10 Progress  Progressing;Ongoing  -LB     LTG 10 Progress Comments  Pt to increase score on the Dynamic Gait index to 12/24 to improve balance and reduce risk of falls.  (new)  -LB        Time Calculation    PT Goal Re-Cert Due Date  03/09/20  -LB       User Key  (r) = Recorded By, (t) = Taken By, (c) = Cosigned By    Initials Name Provider Type    Nalini Becker, PT Physical Therapist          Therapy Education  Education Details: Discussed with pt her overall improvements in cluding advancing to ambulation with a rollator in the community.   Given: Other (comment)  How Provided: Verbal  Provided to: Patient  Level of Understanding: Verbalized    Outcome Measure Options: Dynamic Gait Index, Tinetti  Dynamic Gait Index (DGI)  Gait Level Surface: Mild impairment: walks 20’, uses assistive devices, slower speed, mild gait deviations  Change in Gait Speed: Moderate impairment: Makes only minor adjustments to walking speed, or accomplishes a change in speed with significant gait deviations, or changes speed but loses balance but is able to recover and continue walking.  Gait with  "Horizontal Head Turns: Moderate impairment: Performs head turns with moderate change in gait velocity, slows down, staggers, but recovers, can continue to walk.  Gait with Vertical Head Turns: Moderate impairment: Performs head turns with moderate change in gait velocity, slows down,staggers, but recovers, can continue to walk.  Gait and Pivot Turn: Mild impairment: pivot turns safely in >3 seconds and stops with no loss of balance.  Step Over Obstacle: Severe impairment: Cannot perform without assistance.  Step Around Obstacles: Moderate impairment: Is able to clear cones but must significantly slow speed to accomplish task, or requires verbal cueing.  Steps: Severe impairment: Cannot do safely.(requires 2 rails)  Dynamic Gait Index Score: 8  Dynamic Gait Index Comments: high risk for falls  Tinetti Assessment  Tinetti Assessment: yes  Sitting Balance: Steady,safe  Arises: Able, uses arms  Attempts to Rise: Able in 1 attempt  Immediate Standing Balance (first 5 sec): Steady without support  Standing Balance: Narrow stance, without support  Sternal Nudge (feet close together): Steady  Eyes Closed (feet close together): Steady  Turning 360 Degrees- Steps: Discontinuous steps  Turning 360 Degrees- Steadiness: Unsteady (staggers, grabs)  Sitting Down: Uses arms or not a smooth motion  Tinetti Balance Score: 12  Gait Initiation (immediate after told \"go\"): No hesitancy  Step Length- Right Swing: Right swing foot passes Left stance leg  Step Length- Left Swing: Left swing foot passes Right  Foot Clearance- Right Foot: Right foot completely clears floor  Foot Clearance- Left Foot: Left foot completely clears floor  Step Symmetry: Right and Left step length equal  Step Continuity: Steps appear continuous  Path (excursion): Mild/moderate deviation or use of aid  Trunk: Marked sway or uses device  Base of Support: Heels close while walking  Gait Score: 9  Tinetti Total Score: 21  Tinetti Assistive Device: other (see " comments)(rollator)  Tinetti Assessment Comments: with her AFO      Time Calculation:   Start Time: 1100  Stop Time: 1145  Time Calculation (min): 45 min  Total Timed Code Minutes- PT: 45 minute(s)   Therapy Charges for Today     Code Description Service Date Service Provider Modifiers Qty    19920504558 HC PT NEUROMUSC RE EDUCATION EA 15 MIN 2/10/2020 Nalini Mccall, PT GP 3          PT G-Codes  Outcome Measure Options: Dynamic Gait Index, Tinetti  Tinetti Total Score: 21         Nalini Mccall, PT  2/10/2020

## 2020-02-14 ENCOUNTER — HOSPITAL ENCOUNTER (OUTPATIENT)
Dept: PHYSICAL THERAPY | Facility: HOSPITAL | Age: 70
Setting detail: THERAPIES SERIES
Discharge: HOME | End: 2020-02-14

## 2020-02-14 DIAGNOSIS — Z98.890 HISTORY OF LUMBAR LAMINECTOMY FOR SPINAL CORD DECOMPRESSION: ICD-10-CM

## 2020-02-14 DIAGNOSIS — M48.061 STENOSIS OF LATERAL RECESS OF LUMBAR SPINE: ICD-10-CM

## 2020-02-14 DIAGNOSIS — M62.81 MUSCLE WEAKNESS (GENERALIZED): Primary | ICD-10-CM

## 2020-02-14 DIAGNOSIS — R26.89 FUNCTIONAL GAIT ABNORMALITY: ICD-10-CM

## 2020-02-14 PROCEDURE — 97112 NEUROMUSCULAR REEDUCATION: CPT

## 2020-02-14 NOTE — THERAPY TREATMENT NOTE
"    Outpatient Physical Therapy Neuro Treatment Note  [facility]     Patient Name: Baltazar MELO  : 1950  MRN: [id number]  Today's Date: 2020      Visit Date: 2020    Visit Dx:    ICD-10-CM ICD-9-CM   1. Muscle weakness (generalized) M62.81 728.87   2. Functional gait abnormality R26.89 781.2   3. History of lumbar laminectomy for spinal cord decompression Z98.890 V45.89   4. Stenosis of lateral recess of lumbar spine M48.061 724.02       Patient Active Problem List   Diagnosis   • Hypertension   • Morbid obesity with BMI of 40.0-44.9, adult (CMS/[facility])   • Lumbar back pain with radiculopathy affecting right lower extremity   • Diabetes mellitus (CMS/HCC)   • Renal lesion   • Paroxysmal atrial fibrillation (CMS/[facility]CC)   • Post-operative state   • Weakness of both lower extremities           PT Neuro     Row Name 20 0930             Subjective Comments    Subjective Comments  \"I think we are going to wait on the front steps til it is warmer.\" \"It felt easier going up today.\"   -LB         Precautions and Contraindications    Precautions  Latex Allergy , spinal surgery 19 GREAT difficulty with the CURB on IE   -LB         Subjective Pain    Able to rate subjective pain?  yes  -LB      Pre-Treatment Pain Level  1  -LB      Post-Treatment Pain Level  1  -LB      Subjective Pain Comment  \"My back feels a little numb.\"   -LB         Home Living    Living Environment Comment  first step 3 3/\" and 2nd step 7 /\" into pt's front door   -LB         Cognition    Overall Cognitive Status  WFL  -LB      Memory  Appears intact  -LB      Orientation Level  Oriented X4  -LB      Safety Judgment  Good awareness of safety precautions  -LB      Deficits  Fully aware of deficits  -LB         Posture/Observations    Alignment Options  Forward head  -LB      Forward Head  Mild  -LB      Observations  Edema  -LB      Posture/Observations Comments  Pt arrived to PT ambulating with her rollator.   -LB      " "   Transfers    Sit-Stand Otero (Transfers)  conditional independence definite need of UE's   -LB      Stand-Sit [place] (Transfers)  conditional independence definite need of UE's   -LB      Transfers, Sit-Stand-Sit, Assist Device  -- CLever rollator  -LB      Transfer, Safety Issues  balance decreased during turns  -LB         Gait/Stairs Assessment/Training    Otero Level (Gait)  stand by assist  -LB      Assistive Device (Gait)  AFO;walker, 4-wheeled without AFO 50%  -LB      Distance in Feet (Gait)  100' with AFO, 100' x 2 without AFO   -LB      Pattern (Gait)  step-through  -LB      Bilateral Gait Deviations  forward flexed posture  -LB      Left Sided Gait Deviations  weight shift ability decreased  -LB      Right Sided Gait Deviations  weight shift ability decreased  -LB      [place]o Level (Stairs)  moderate assist (50% patient effort);maximum assist (25% patient effort);2 person assist HHA of , CGA of PT   -LB      Handrail Location (Stairs)  left side (ascending)  -LB      Number of Steps (Stairs)  three 4\" steps   -LB      Ascending Technique (Stairs)  step-to-step leading with R LE  -LB      Descending Technique (Stairs)  step-to-step  -LB      Otero Level (Ramp)  contact guard  -LB      Assistive Device (Ramp)  other (see comments) CLever rollator  -LB         Balance Skills Training    Gait Balance-Level of Assistance  Contact guard  -LB      Gait Balance Support  Right upper extremity supported;Left upper extremity supported;parallel bars  -LB      Gait Balance Activities  backwards;side-stepping  -LB         Orthotic/Prosthetic Management    Orthosis Location  AFO (ankle foot orthosis)  -LB         Ankle Foot Orthosis Management    Wearing Schedule (Ankle Foot Orthosis)  wear with activity/work  -LB      Orthosis Training (Ankle Foot Orthosis)  patient and caregiver  -LB      Compliance/Wearing Issues (Ankle Foot Orthosis)  patient/caregiver comprehend strategies " " -LB        User Key  (r) = Recorded By, (t) = Taken By, (c) = Cosigned By    Initials Name Provider Type    Nalini Becker PT Physical Therapist                  PT Assessment/Plan     Row Name 02/14/20 1211          PT Assessment    Assessment Comments  Pt was able to ascend 3 steps with one rail with less effortand pt's  provided appropriate help. However, pt has 5 steps that are 8\" in height and pt was advised not to try her steps at this time.   -LB       User Key  (r) = Recorded By, (t) = Taken By, (c) = Cosigned By    Initials Name Provider Type    Nalini Becker PT Physical Therapist             OP Exercises     Row Name 02/14/20 0930             Subjective Comments    Subjective Comments  \"I think we are going to wait on the front steps til it is warmer.\" \"It felt easier going up today.\"   -LB         Subjective Pain    Able to rate subjective pain?  yes  -LB      Pre-Treatment Pain Level  1  -LB      Post-Treatment Pain Level  1  -LB      Subjective Pain Comment  \"My back feels a little numb.\"   -LB         Exercise 1    Exercise Name 1  gastroc stretch on incline at end of ll bars with B UE support wearing her shoes w/o orthotic or AFO performed before balance activities  -LB      Cueing 1  Verbal;Tactile  -LB         Exercise 4    Exercise Name 4  bilateral plantarflexion while standing on the incline at the end of the ll bars with strong pressure thru UE's   -LB      Cueing 4  Verbal;Tactile  -LB      Reps 4  15  -LB        User Key  (r) = Recorded By, (t) = Taken By, (c) = Cosigned By    Initials Name Provider Type    Nalini Becker PT Physical Therapist                          Therapy Education  Education Details: Stairs with one rail with HHA from her .   Given: Mobility training  How Provided: Verbal  Provided to: Patient  Level of Understanding: Verbalized, Teach back education performed, Demonstrated              Time Calculation:   Start Time: 0930  Stop Time: " 1015  Time Calculation (min): 45 min  Total Timed Code Minutes- PT: 45 minute(s)   Therapy Charges for Today     Code Description Service Date Service Provider Modifiers Qty    98014839846 HC PT NEUROMUSC RE EDUCATION EA 15 MIN 2/14/2020 Nalini Mccall, PT GP 3                    Nalini Mccall, PT  2/14/2020

## 2020-02-17 ENCOUNTER — HOSPITAL ENCOUNTER (OUTPATIENT)
Dept: PHYSICAL THERAPY | Facility: HOSPITAL | Age: 70
Setting detail: THERAPIES SERIES
Discharge: HOME | End: 2020-02-17

## 2020-02-17 DIAGNOSIS — M48.061 STENOSIS OF LATERAL RECESS OF LUMBAR SPINE: ICD-10-CM

## 2020-02-17 DIAGNOSIS — M62.81 MUSCLE WEAKNESS (GENERALIZED): Primary | ICD-10-CM

## 2020-02-17 DIAGNOSIS — Z98.890 HISTORY OF LUMBAR LAMINECTOMY FOR SPINAL CORD DECOMPRESSION: ICD-10-CM

## 2020-02-17 DIAGNOSIS — R26.89 FUNCTIONAL GAIT ABNORMALITY: ICD-10-CM

## 2020-02-17 PROCEDURE — 97112 NEUROMUSCULAR REEDUCATION: CPT

## 2020-02-17 NOTE — THERAPY TREATMENT NOTE
"    Outpatient Physical Therapy Neuro Treatment Note  [facility]     Patient Name: Baltazar MELO  : 1950  MRN: [id number]  Today's Date: 2020      Visit Date: 2020    Visit Dx:    ICD-10-CM ICD-9-CM   1. Muscle weakness (generalized) M62.81 728.87   2. Functional gait abnormality R26.89 781.2   3. History of lumbar laminectomy for spinal cord decompression Z98.890 V45.89   4. Stenosis of lateral recess of lumbar spine M48.061 724.02       Patient Active Problem List   Diagnosis   • Hypertension   • Morbid obesity with BMI of 40.0-44.9, adult (CMS/HCC)   • Lumbar back pain with radiculopathy affecting right lower extremity   • Diabetes mellitus (CMS/HCC)   • Renal lesion   • Paroxysmal atrial fibrillation (CMS/HCC)   • Post-operative state   • Weakness of both lower extremities           PT Neuro     Row Name 20 1100             Subjective Comments    Subjective Comments  \"I went up 3 of the steps to the kitchen but then I turned around and came back down. My  held my hand and my son gave me a lift on the 3rd step.\"  \"I felt the need to turn around.\"   -LB         Precautions and Contraindications    Precautions  Latex Allergy , spinal surgery 19 GREAT difficulty with the CURB on IE   -LB         Subjective Pain    Able to rate subjective pain?  yes  -LB      Pre-Treatment Pain Level  1  -LB      Post-Treatment Pain Level  1  -LB         Home Living    Living Environment Comment  first step 3 3/4\" and 2nd step 7 1/\" into pt's front door   -LB         Cognition    Overall Cognitive Status  WFL  -LB      Memory  Appears intact  -LB      Orientation Level  Oriented X4  -LB      Safety Judgment  Good awareness of safety precautions  -LB      Deficits  Fully aware of deficits  -LB         Posture/Observations    Alignment Options  Forward head  -LB      Forward Head  Mild  -LB      Observations  Edema  -LB         Transfers    Sit-Stand [place] (Transfers)  conditional " "independence definite need of UE's   -LB      Stand-Sit [place] (Transfers)  conditional independence definite need to use her UE's   -LB      Transfers, Sit-Stand-Sit, Assist Device  -- [address]  -LB      Transfer, Safety Issues  balance decreased during turns  -LB      Comment (Transfers)  Pt sat in an armless chair positioned on the 2nd step. Pt sat on the chair with CGA.and then turned sideways and sat down with CGA. To stand from armless chair sideways with CGA.   -LB         Gait/Stairs Assessment/Training    [place] Level (Gait)  stand by assist  -LB      Assistive Device (Gait)  walker, 4-wheeled [address] rollator and no AFO   -LB      Distance in Feet (Gait)  90' x 2 without AFO   -LB      Pattern (Gait)  step-through  -LB      Bilateral Gait Deviations  forward flexed posture  -LB      Left Sided Gait Deviations  weight shift ability decreased  -LB      Right Sided Gait Deviations  weight shift ability decreased  -LB      [place] Level (Stairs)  contact guard;verbal cues  -LB      Handrail Location (Stairs)  both sides  -LB      Number of Steps (Stairs)  4  -LB      Ascending Technique (Stairs)  step-to-step leading with R LE  -LB      Descending Technique (Stairs)  step-to-step with L LE leading   -LB      Comment (Gait/Stairs)  Pt stepped up onto a 4\" backwards with her rollator with CGA, of PT and . Pt then sat down into an armless chair with CGA. Pt turned sideways and the n stood up with a standard rolling walker with CGA. (to simulate pt's front 2 steps to the porch)   -LB         Balance Skills Training    Standing-Level of Assistance  Contact guard  -LB      Static Standing Balance Support  Right upper extremity supported;Left upper extremity supported;parallel bars light UE support   -LB      Standing-Balance Activities  Feet together;Standing on 1/2 roll;Compliant surfaces  -LB      Gait Balance-Level of Assistance  Contact guard  -LB      Gait Balance Support  Right " "upper extremity supported;Left upper extremity supported;parallel bars  -LB      Gait Balance Activities  backwards;side-stepping sideways with knees straight and then knees flexed   -LB         Orthotic/Prosthetic Management    Orthosis Location  AFO (ankle foot orthosis)  -LB         Ankle Foot Orthosis Management    Type (Ankle/Foot Orthosis)  -- posterior lateral strut AFO (Thuasne)  -LB      Wearing Schedule (Ankle Foot Orthosis)  wear with activity/work  -LB      Orthosis Training (Ankle Foot Orthosis)  patient and caregiver  -LB      Compliance/Wearing Issues (Ankle Foot Orthosis)  patient/caregiver comprehend strategies  -LB        User Key  (r) = Recorded By, (t) = Taken By, (c) = Cosigned By    Initials Name Provider Type    Nalini Becker, PT Physical Therapist                  PT Assessment/Plan     Row Name 02/17/20 1623          PT Assessment    Assessment Comments  Pt reporting she ascended 3 of her five 8\" steps and then decided to turn around and come down. Pt reporting her and son had to help her on the last step. Pt and  were instructed in the technique with ascending her front 2 steps with stepping up first step backwards and then sitting on an armless chair. Pt and  demonstrated good technique on the 2 steps. PT recommended pt enter her kitchen level thru the front door with two 4\" steps verses ascending her five 8\" steps with one rail within the home. Pt descends steps with one rail and HHA with no issues.      -LB       User Key  (r) = Recorded By, (t) = Taken By, (c) = Cosigned By    Initials Name Provider Type    Nalini Becker PT Physical Therapist             OP Exercises     Row Name 02/17/20 1100             Subjective Comments    Subjective Comments  \"I went up 3 of the steps to the kitchen but then I turned around and came back down. My  held my hand and my son gave me a lift on the 3rd step.\"  \"I felt the need to turn around.\"   -LB         Subjective Pain " "   Able to rate subjective pain?  yes  -LB      Pre-Treatment Pain Level  1  -LB      Post-Treatment Pain Level  1  -LB         Exercise 1    Exercise Name 1  gastroc stretch on incline at end of ll bars with B UE support wearing her shoes w/o orthotic or AFO performed before balance activities  -LB      Cueing 1  Verbal;Tactile  -LB      Time 1  60  -LB         Exercise 4    Exercise Name 4  bilateral plantarflexion while standing on the incline at the end of the ll bars with strong pressure thru UE's   -LB      Cueing 4  Verbal;Tactile  -LB      Reps 4  15  -LB         Exercise 7    Exercise Name 7  knee flexion each LE in standing within ll bars   -LB      Cueing 7  Verbal;Tactile  -LB      Reps 7  10 each LE   -LB         Exercise 9    Exercise Name 9  Sidestepping with knees flexed ~ 10 degrees with strong CGA   -LB      Cueing 9  Verbal;Tactile  -LB      Reps 9  10' each direction   -LB        User Key  (r) = Recorded By, (t) = Taken By, (c) = Cosigned By    Initials Name Provider Type    LB Nalini Mccall, PT Physical Therapist                          Therapy Education  Education Details: Pt educated on ascending 2 steps by ascending the first a 4\" step backwards and then sitting on a chair and turning sideways to sit in a chair without arms.   Given: Mobility training  How Provided: Verbal  Provided to: Patient, Caregiver  Level of Understanding: Teach back education performed, Verbalized, Demonstrated              Time Calculation:   Start Time: 1100  Stop Time: 1146  Time Calculation (min): 46 min  Total Timed Code Minutes- PT: 46 minute(s)   Therapy Charges for Today     Code Description Service Date Service Provider Modifiers Qty    [id number]  PT NEUROMUSC RE EDUCATION EA 15 MIN 2/17/2020 Nalini Mccall, PT GP 3                    Nalini Mccall, PT  2/17/2020     "

## 2020-02-21 ENCOUNTER — HOSPITAL ENCOUNTER (OUTPATIENT)
Dept: PHYSICAL THERAPY | Facility: HOSPITAL | Age: 70
Setting detail: THERAPIES SERIES
Discharge: HOME | End: 2020-02-21

## 2020-02-21 DIAGNOSIS — M62.81 MUSCLE WEAKNESS (GENERALIZED): Primary | ICD-10-CM

## 2020-02-21 DIAGNOSIS — R26.89 FUNCTIONAL GAIT ABNORMALITY: ICD-10-CM

## 2020-02-21 PROCEDURE — 97112 NEUROMUSCULAR REEDUCATION: CPT

## 2020-02-21 NOTE — THERAPY TREATMENT NOTE
"    Outpatient Physical Therapy Neuro Treatment Note  [facility]     Patient Name: Baltazar MELO  : 1950  MRN: [id number]  Today's Date: 2020      Visit Date: 2020    Visit Dx:    ICD-10-CM ICD-9-CM   1. Muscle weakness (generalized) M62.81 728.87   2. Functional gait abnormality R26.89 781.2       Patient Active Problem List   Diagnosis   • Hypertension   • Morbid obesity with BMI of 40.0-44.9, adult (CMS/[facility])   • Lumbar back pain with radiculopathy affecting right lower extremity   • Diabetes mellitus (CMS/HCC)   • Renal lesion   • Paroxysmal atrial fibrillation (CMS/[facility])   • Post-operative state   • Weakness of both lower extremities           PT Neuro     Row Name 20 0994             Subjective Comments    Subjective Comments  \"I went tot he dentist and was able to walk up and down 2 different hallways without any problems.\"   -LB         Precautions and Contraindications    Precautions  Latex Allergy , spinal surgery 19 GREAT difficulty with the CURB on IE   -LB         Subjective Pain    Able to rate subjective pain?  yes  -LB      Pre-Treatment Pain Level  0  -LB      Post-Treatment Pain Level  0  -LB         Home Living    Living Arrangements  -- is a quad level home  -LB      Home Accessibility  wheelchair accessible to one level   -LB      Home Equipment  Rollator;Rolling walker;Wheelchair-manual BSC, tub bench, L AFO, compression socks  -LB      Living Environment Comment  first step 3 3/4\" and 2nd step 7 1/2\" into pt's front door ,, From one level to kitchen level five 8\" steps with one rail    -LB         Cognition    Overall Cognitive Status  WFL  -LB      Memory  Appears intact  -LB      Orientation Level  Oriented X4  -LB      Safety Judgment  Good awareness of safety precautions  -LB      Deficits  Fully aware of deficits  -LB         Posture/Observations    Alignment Options  Forward head  -LB      Forward Head  Mild  -LB      Observations  Edema  -LB         " Transfers    Sit-Stand [place] (Transfers)  conditional independence definite need of UE's   -LB      Stand-Sit [place] (Transfers)  conditional independence definite need of UE's  -LB      Transfers, Sit-Stand-Sit, Assist Device  -- [address]   -LB         Gait/Stairs Assessment/Training    [place] Level (Gait)  stand by assist  -LB      Assistive Device (Gait)  walker, 4-wheeled  -LB      Distance in Feet (Gait)  100' without AFO   -LB      Pattern (Gait)  step-through  -LB      Bilateral Gait Deviations  forward flexed posture decreased pushoff  -LB      [place] Level (Stairs)  moderate assist (50% patient effort) HHA of , CGA of PT AFO in place  -LB      Handrail Location (Stairs)  left side (ascending)  -LB      Number of Steps (Stairs)  3  -LB      Ascending Technique (Stairs)  step-to-step leading with R LE   -LB      Descending Technique (Stairs)  step-to-step with L LE leading   -LB      Comment (Gait/Stairs)  Pt struggled with pushing up with her R LE on the steps with one rail. Pt ascended and descended 4 steps with 2 rails with CGA with step to step.   -LB         Balance Skills Training    Standing-Level of Assistance  Contact guard  -LB      Static Standing Balance Support  Right upper extremity supported;Left upper extremity supported;parallel bars light UE support  -LB      Standing-Balance Activities  Feet together;Standing on 1/2 roll;Compliant surfaces  -LB      Gait Balance-Level of Assistance  Contact guard  -LB      Gait Balance Support  Right upper extremity supported;Left upper extremity supported;miguel angel bar  -LB      Gait Balance Activities  backwards;side-stepping backwards with R hand on the rail and minimal HHA on the L  -LB      Balance Comments  balance activities without the AFO   -LB         Orthotic/Prosthetic Management    Orthosis Location  AFO (ankle foot orthosis)  -LB         Ankle Foot Orthosis Management    Type (Ankle/Foot Orthosis)  -- posterior  "lateral strut AFO (Thuasne)  -LB      Wearing Schedule (Ankle Foot Orthosis)  wear with activity/work  -LB      Orthosis Training (Ankle Foot Orthosis)  patient and caregiver  -LB      Compliance/Wearing Issues (Ankle Foot Orthosis)  patient/caregiver comprehend strategies  -LB        User Key  (r) = Recorded By, (t) = Taken By, (c) = Cosigned By    Initials Name Provider Type    Nalini Becker PT Physical Therapist                  PT Assessment/Plan     Row Name 02/21/20 1247          PT Assessment    Assessment Comments  Pt was able to perform 3 steps with her  providing moderate HHA and CGA of PT. Pt struggled with the ascending the last 2 steps.   -LB       User Key  (r) = Recorded By, (t) = Taken By, (c) = Cosigned By    Initials Name Provider Type    Nalini Becker PT Physical Therapist             OP Exercises     Row Name 02/21/20 0930             Subjective Comments    Subjective Comments  \"I went tot he dentist and was able to walk up and down 2 different hallways without any problems.\"   -LB         Subjective Pain    Able to rate subjective pain?  yes  -LB      Pre-Treatment Pain Level  0  -LB      Post-Treatment Pain Level  0  -LB        User Key  (r) = Recorded By, (t) = Taken By, (c) = Cosigned By    Initials Name Provider Type    Nalini Becker PT Physical Therapist                          Therapy Education  Education Details: Stairs with moderate HHA of . Stressed to pt to push down thru the rails verses pulling up on the rails.   Given: Mobility training  How Provided: Verbal  Provided to: Patient, Caregiver  Level of Understanding: Verbalized, Teach back education performed, Demonstrated              Time Calculation:   Start Time: 0930  Stop Time: 1015  Time Calculation (min): 45 min  Total Timed Code Minutes- PT: 45 minute(s)   Therapy Charges for Today     Code Description Service Date Service Provider Modifiers Qty    [id number]  PT NEUROMUSC RE EDUCATION EA 15 " MIN 2/21/2020 Nalini Mccall, PT GP 3                    Nalini Mccall, PT  2/21/2020

## 2020-02-24 ENCOUNTER — HOSPITAL ENCOUNTER (OUTPATIENT)
Dept: PHYSICAL THERAPY | Facility: HOSPITAL | Age: 70
Setting detail: THERAPIES SERIES
Discharge: HOME | End: 2020-02-24

## 2020-02-24 DIAGNOSIS — M48.061 STENOSIS OF LATERAL RECESS OF LUMBAR SPINE: ICD-10-CM

## 2020-02-24 DIAGNOSIS — Z98.890 HISTORY OF LUMBAR LAMINECTOMY FOR SPINAL CORD DECOMPRESSION: ICD-10-CM

## 2020-02-24 DIAGNOSIS — R26.89 FUNCTIONAL GAIT ABNORMALITY: ICD-10-CM

## 2020-02-24 DIAGNOSIS — M62.81 MUSCLE WEAKNESS (GENERALIZED): Primary | ICD-10-CM

## 2020-02-24 PROCEDURE — 97112 NEUROMUSCULAR REEDUCATION: CPT

## 2020-02-24 NOTE — THERAPY TREATMENT NOTE
"    Outpatient Physical Therapy Neuro Treatment Note  [facility]     Patient Name: Baltazar MELO  : 1950  MRN: [id number]  Today's Date: 2020      Visit Date: 2020    Visit Dx:    ICD-10-CM ICD-9-CM   1. Muscle weakness (generalized) M62.81 728.87   2. Functional gait abnormality R26.89 781.2   3. History of lumbar laminectomy for spinal cord decompression Z98.890 V45.89   4. Stenosis of lateral recess of lumbar spine M48.061 724.02       Patient Active Problem List   Diagnosis   • Hypertension   • Morbid obesity with BMI of 40.0-44.9, adult (CMS/HCC)   • Lumbar back pain with radiculopathy affecting right lower extremity   • Diabetes mellitus (CMS/HCC)   • Renal lesion   • Paroxysmal atrial fibrillation (CMS/HCC)   • Post-operative state   • Weakness of both lower extremities           PT Neuro     Row Name 20 1102             Subjective Comments    Subjective Comments  \"I want to work on the steps.\" \"The top step was easier.\" (handrail flat and extended at the top) step)  -LB         Precautions and Contraindications    Precautions  Latex Allergy , spinal surgery 19 GREAT difficulty with the CURB on IE   -LB         Subjective Pain    Able to rate subjective pain?  yes  -LB      Pre-Treatment Pain Level  0  -LB      Post-Treatment Pain Level  0  -LB         Home Living    Living Environment Comment  first step 3 3/4\" and 2nd step 7 1/2\" into pt's front door ,, From one level to kitchen level five 8\" steps with one rail    -LB         Cognition    Overall Cognitive Status  WFL  -LB      Memory  Appears intact  -LB      Orientation Level  Oriented X4  -LB      Safety Judgment  Good awareness of safety precautions  -LB      Deficits  Fully aware of deficits  -LB         Posture/Observations    Alignment Options  Forward head  -LB      Forward Head  Mild  -LB      Observations  Edema mild edema in both LE's   -LB         Bed Mobility Assessment/Treatment    Rolling Left [place] " (Bed Mobility)  independent  -LB      Rolling Right [place] (Bed Mobility)  independent  -LB      Supine-Sit [place] (Bed Mobility)  independent  -LB      Sit-Supine [place] (Bed Mobility)  independent  -LB         Transfers    Sit-Stand [place] (Transfers)  conditional independence  -LB      Stand-Sit [place] (Transfers)  conditional independence  -LB      Transfers, Sit-Stand-Sit, Assist Device  rolling walker [place] rollator   -LB      Transfer, Safety Issues  balance decreased during turns  -LB         Gait/Stairs Assessment/Training    [place] Level (Gait)  stand by assist  -LB      Assistive Device (Gait)  walker, 4-wheeled  -LB      Distance in Feet (Gait)  140'   -LB      Pattern (Gait)  step-through  -LB      Bilateral Gait Deviations  forward flexed posture decreased pushoff   -LB      [place] Level (Stairs)  moderate assist (50% patient effort) moderate HHA of    CGA of PT   -LB      Handrail Location (Stairs)  left side (ascending)  -LB      Number of Steps (Stairs)  3  -LB      Ascending Technique (Stairs)  step-to-step leading with R LE  -LB      Descending Technique (Stairs)  step-to-step leading with  L LE  -LB      Comment (Gait/Stairs)  Pt ascended and descended 4 steps with 2 rails with CGA (step tp step)   -LB         Balance Skills Training    Standing-Level of Assistance  Contact guard  -LB      Static Standing Balance Support  Right upper extremity supported;Left upper extremity supported;parallel bars  -LB      Standing-Balance Activities  Feet together;Standing on 1/2 roll;Compliant surfaces  -LB      Gait Balance-Level of Assistance  Contact guard  -LB      Gait Balance Support  Right upper extremity supported;Left upper extremity supported;miguel angel bar  -LB      Gait Balance Activities  backwards;side-stepping  -LB         Orthotic/Prosthetic Management    Orthosis Location  AFO (ankle foot orthosis)  -LB         Ankle Foot Orthosis Management    Type  "(Ankle/Foot Orthosis)  -- posterior lateral strut AFO (Thuasne)  -LB      Wearing Schedule (Ankle Foot Orthosis)  wear with activity/work  -LB      Orthosis Training (Ankle Foot Orthosis)  patient and caregiver  -LB      Compliance/Wearing Issues (Ankle Foot Orthosis)  patient/caregiver comprehend strategies  -LB        User Key  (r) = Recorded By, (t) = Taken By, (c) = Cosigned By    Initials Name Provider Type    Nalini Becker PT Physical Therapist                  PT Assessment/Plan     Row Name 02/24/20 1250          PT Assessment    Assessment Comments  Pt eager to work on steps with one rail and two rails. Pt's  provided moderate HHA on the steps using a good technique.  -LB       User Key  (r) = Recorded By, (t) = Taken By, (c) = Cosigned By    Initials Name Provider Type    Nalini Becker PT Physical Therapist             OP Exercises     Row Name 02/24/20 1102             Subjective Comments    Subjective Comments  \"I want to work on the steps.\" \"The top step was easier.\" (handrail flat and extended at the top) step)  -LB         Subjective Pain    Able to rate subjective pain?  yes  -LB      Pre-Treatment Pain Level  0  -LB      Post-Treatment Pain Level  0  -LB        User Key  (r) = Recorded By, (t) = Taken By, (c) = Cosigned By    Initials Name Provider Type    Nalini Becker, PT Physical Therapist                          Therapy Education  Education Details: Emphasized the importance of pushing striahgt thru her 's hand verses twisting his hand when he provides HHA on the steps.   Given: Mobility training  How Provided: Verbal, Demonstration  Provided to: Patient, Caregiver  Level of Understanding: Teach back education performed, Verbalized, Demonstrated              Time Calculation:   Start Time: 1102  Stop Time: 1148  Time Calculation (min): 46 min  Total Timed Code Minutes- PT: 46 minute(s)   Therapy Charges for Today     Code Description Service Date Service Provider " Modifiers Qty    02065433807 HC PT NEUROMUSC RE EDUCATION EA 15 MIN 2/24/2020 Nalini Mccall, PT GP 3                    Nalini Mccall, PT  2/24/2020

## 2020-02-28 ENCOUNTER — HOSPITAL ENCOUNTER (OUTPATIENT)
Dept: PHYSICAL THERAPY | Facility: HOSPITAL | Age: 70
Setting detail: THERAPIES SERIES
Discharge: HOME | End: 2020-02-28

## 2020-02-28 DIAGNOSIS — Z98.890 HISTORY OF LUMBAR LAMINECTOMY FOR SPINAL CORD DECOMPRESSION: ICD-10-CM

## 2020-02-28 DIAGNOSIS — M62.81 MUSCLE WEAKNESS (GENERALIZED): Primary | ICD-10-CM

## 2020-02-28 DIAGNOSIS — M48.061 STENOSIS OF LATERAL RECESS OF LUMBAR SPINE: ICD-10-CM

## 2020-02-28 DIAGNOSIS — R26.89 FUNCTIONAL GAIT ABNORMALITY: ICD-10-CM

## 2020-02-28 PROCEDURE — 97112 NEUROMUSCULAR REEDUCATION: CPT

## 2020-02-28 NOTE — THERAPY TREATMENT NOTE
"    Outpatient Physical Therapy Neuro Treatment Note  [facility]     Patient Name: Baltazar MELO  : 1950  MRN: [id number]  Today's Date: 2020      Visit Date: 2020    Visit Dx:    ICD-10-CM ICD-9-CM   1. Muscle weakness (generalized) M62.81 728.87   2. Functional gait abnormality R26.89 781.2   3. History of lumbar laminectomy for spinal cord decompression Z98.890 V45.89   4. Stenosis of lateral recess of lumbar spine M48.061 724.02       Patient Active Problem List   Diagnosis   • Hypertension   • Morbid obesity with BMI of 40.0-44.9, adult (CMS/HCC)   • Lumbar back pain with radiculopathy affecting right lower extremity   • Diabetes mellitus (CMS/HCC)   • Renal lesion   • Paroxysmal atrial fibrillation (CMS/HCC)   • Post-operative state   • Weakness of both lower extremities           PT Neuro     Row Name 20 0931             Subjective Comments    Subjective Comments  \"How are you going to support me if I need help?\" (pt asking her  referring to holding the belt when they performed steps with simulation of their home environment with PT providing only CGA and no verbal cues.   -LB         Precautions and Contraindications    Precautions  Latex Allergy , spinal surgery 19 GREAT difficulty with the CURB on IE   -LB         Subjective Pain    Able to rate subjective pain?  yes  -LB      Pre-Treatment Pain Level  0  -LB      Post-Treatment Pain Level  0  -LB         Home Living    Living Environment Comment  first step 3 3/4\" and 2nd step 7 1/2\" into pt's front door ,, From one level to kitchen level five 8\" steps with one rail    -LB         Cognition    Overall Cognitive Status  WFL  -LB      Memory  Appears intact  -LB      Orientation Level  Oriented X4  -LB      Safety Judgment  Good awareness of safety precautions  -LB      Deficits  Fully aware of deficits  -LB         Posture/Observations    Alignment Options  Forward head  -LB      Forward Head  Mild  -LB      " "Observations  Edema mild edema in both legs  -LB         Bed Mobility Assessment/Treatment    Comment (Bed Mobility)  not performed   -LB         Transfers    Sit-Stand [place] (Transfers)  conditional independence  -LB      Stand-Sit [place] (Transfers)  conditional independence  -LB      Transfers, Sit-Stand-Sit, Assist Device  other (see comments) rollator   -LB      Transfer, Safety Issues  balance decreased during turns  -LB         Gait/Stairs Assessment/Training    [place] Level (Gait)  stand by assist  -LB      Assistive Device (Gait)  walker, 4-wheeled without AFO   -LB      Distance in Feet (Gait)  150'  -LB      Pattern (Gait)  step-through  -LB      Bilateral Gait Deviations  forward flexed posture decreased pushoff  -LB      [place] Level (Stairs)  moderate assist (50% patient effort) HHA of  and CGA of PT   -LB      Handrail Location (Stairs)  left side (ascending)  -LB      Number of Steps (Stairs)  3  -LB      Ascending Technique (Stairs)  step-to-step leading with R LE   -LB      Descending Technique (Stairs)  step-to-step leading with L LE  -LB      Stairs, Safety Issues  weight-shifting ability decreased  -LB      Stairs, Impairments  strength decreased;impaired balance  -LB      Comment (Gait/Stairs)  Pt ascended a 8\" step with her rollator with minimal of her  and CGA of another. Pt descended an 8\" curb with a rolling walker with CGA of her  and CGA of PT.  STRONG verbal cues required from PT.   -LB         Balance Skills Training    Standing-Level of Assistance  Contact guard  -LB      Static Standing Balance Support  Right upper extremity supported;Left upper extremity supported;parallel bars  -LB      Standing-Balance Activities  Compliant surfaces;Standing on 1/2 roll  -LB      Gait Balance-Level of Assistance  Contact guard  -LB      Gait Balance Support  Right upper extremity supported;Left upper extremity supported;miguel angel bar  -LB      Gait Balance " "Activities  backwards;side-stepping with knees slightly flexed   -LB        User Key  (r) = Recorded By, (t) = Taken By, (c) = Cosigned By    Initials Name Provider Type    Nalini Becker, PT Physical Therapist                  PT Assessment/Plan     Row Name 02/28/20 1406          PT Assessment    Assessment Comments  Pt was able to ascend and descend an 8\" curb with her rollator or a rolling walker with her 's assist to simulate the last step up/down into the kitchen. PT strongly feels the entrance to her front door is safest as she only has one step and then would sit on a chair verses ascending and descending five 8\" steps.   -LB       User Key  (r) = Recorded By, (t) = Taken By, (c) = Cosigned By    Initials Name Provider Type    Nalini Becker PT Physical Therapist             OP Exercises     Row Name 02/28/20 0940             Subjective Comments    Subjective Comments  \"How are you going to support me if I need help?\" (pt asking her  referring to holding the belt when they performed steps with simulation of their home environment with PT providing only CGA and no verbal cues.   -LB         Subjective Pain    Able to rate subjective pain?  yes  -LB      Pre-Treatment Pain Level  0  -LB      Post-Treatment Pain Level  0  -LB         Exercise 1    Exercise Name 1  gastroc stretch on incline at end of ll bars with B UE support wearing her shoes w/o orthotic or AFO performed before balance activities  -LB      Cueing 1  Verbal;Tactile  -LB      Time 1  60  -LB         Exercise 4    Exercise Name 4  bilateral plantarflexion while standing on the incline at the end of the ll bars with strong pressure thru UE's   -LB      Cueing 4  Verbal;Tactile  -LB      Sets 4  2  -LB      Reps 4  10  -LB        User Key  (r) = Recorded By, (t) = Taken By, (c) = Cosigned By    Initials Name Provider Type    Nalini Becker PT Physical Therapist                          Therapy Education  Education Details: " "Ascending and descending an 8\" curb with her  providing assistance. STRONGLY recommended pt try the entrance to the front door (which is one step and then sit on a chair) verses five 8\" steps into her kitchen area. Recommneded pt have another person for CGA.   Given: Fall prevention and home safety, Mobility training  How Provided: Verbal  Provided to: Patient  Level of Understanding: Verbalized, Teach back education performed, Demonstrated              Time Calculation:   Start Time: 0931  Stop Time: 1015  Time Calculation (min): 44 min  Total Timed Code Minutes- PT: 44 minute(s)   Therapy Charges for Today     Code Description Service Date Service Provider Modifiers Qty    24434856295 HC PT NEUROMUSC RE EDUCATION EA 15 MIN 2/28/2020 Nalini Mccall, PT GP 3                    Nalini Mccall, PT  2/28/2020     "

## 2020-03-02 ENCOUNTER — HOSPITAL ENCOUNTER (OUTPATIENT)
Dept: PHYSICAL THERAPY | Facility: HOSPITAL | Age: 70
Setting detail: THERAPIES SERIES
Discharge: HOME | End: 2020-03-02

## 2020-03-02 DIAGNOSIS — M48.061 STENOSIS OF LATERAL RECESS OF LUMBAR SPINE: ICD-10-CM

## 2020-03-02 DIAGNOSIS — R26.89 FUNCTIONAL GAIT ABNORMALITY: ICD-10-CM

## 2020-03-02 DIAGNOSIS — Z98.890 HISTORY OF LUMBAR LAMINECTOMY FOR SPINAL CORD DECOMPRESSION: ICD-10-CM

## 2020-03-02 DIAGNOSIS — M62.81 MUSCLE WEAKNESS (GENERALIZED): Primary | ICD-10-CM

## 2020-03-02 PROCEDURE — 97112 NEUROMUSCULAR REEDUCATION: CPT

## 2020-03-06 ENCOUNTER — HOSPITAL ENCOUNTER (OUTPATIENT)
Dept: PHYSICAL THERAPY | Facility: HOSPITAL | Age: 70
Setting detail: THERAPIES SERIES
Discharge: HOME | End: 2020-03-06

## 2020-03-06 DIAGNOSIS — M62.81 MUSCLE WEAKNESS (GENERALIZED): Primary | ICD-10-CM

## 2020-03-06 DIAGNOSIS — R26.89 FUNCTIONAL GAIT ABNORMALITY: ICD-10-CM

## 2020-03-06 DIAGNOSIS — Z98.890 HISTORY OF LUMBAR LAMINECTOMY FOR SPINAL CORD DECOMPRESSION: ICD-10-CM

## 2020-03-06 DIAGNOSIS — M48.061 STENOSIS OF LATERAL RECESS OF LUMBAR SPINE: ICD-10-CM

## 2020-03-06 PROCEDURE — 97112 NEUROMUSCULAR REEDUCATION: CPT

## 2020-03-06 NOTE — THERAPY TREATMENT NOTE
"    Outpatient Physical Therapy Neuro Treatment Note  [facility]     Patient Name: Baltazar MELO  : 1950  MRN: [id number]  Today's Date: 3/6/2020      Visit Date: 2020    Visit Dx:    ICD-10-CM ICD-9-CM   1. Muscle weakness (generalized) M62.81 728.87   2. Functional gait abnormality R26.89 781.2   3. History of lumbar laminectomy for spinal cord decompression Z98.890 V45.89   4. Stenosis of lateral recess of lumbar spine M48.061 724.02       Patient Active Problem List   Diagnosis   • Hypertension   • Morbid obesity with BMI of 40.0-44.9, adult (CMS/HCC)   • Lumbar back pain with radiculopathy affecting right lower extremity   • Diabetes mellitus (CMS/HCC)   • Renal lesion   • Paroxysmal atrial fibrillation (CMS/[facility])   • Post-operative state   • Weakness of both lower extremities           PT Neuro     Row Name 20 0931             Subjective Comments    Subjective Comments  \"I could push harder with the cane. I really like this cane. Where can I get it?\"   -LB         Precautions and Contraindications    Precautions  Latex Allergy , spinal surgery 19 GREAT difficulty with the CURB on IE   -LB         Subjective Pain    Able to rate subjective pain?  yes  -LB      Pre-Treatment Pain Level  0  -LB      Post-Treatment Pain Level  0  -LB         Home Living    Living Environment Comment  first step 3 3/4\" and 2nd step 7 1/2\" into pt's front door ,, From one level to kitchen level five 8\" steps with one rail    -LB         Cognition    Overall Cognitive Status  WFL  -LB      Memory  Appears intact  -LB      Orientation Level  Oriented X4  -LB      Safety Judgment  Good awareness of safety precautions  -LB      Deficits  Fully aware of deficits  -LB         Posture/Observations    Alignment Options  Forward head  -LB      Forward Head  Mild  -LB      Observations  Edema mild edema in both legs   -LB      Posture/Observations Comments  Pt arrived to PT ambulating with her rollator.   -LB      "    Transfers    Sit-Stand [place] (Transfers)  contact guard;verbal cues  -LB      Stand-Sit [place] (Transfers)  contact guard;verbal cues  -LB      Transfers, Sit-Stand-Sit, Assist Device  quad cane SBQC (Guardian)   -LB      Transfer, Safety Issues  other (see comments) definite need of UE's with to stand   -LB      Comment (Transfers)  Pt was introduced to the SBQC with ambulation with minimal to moderate HHA short distance and use on the steps.    -LB         Gait/Stairs Assessment/Training    [place] Level (Gait)  minimum assist (75% patient effort);moderate assist (50% patient effort);verbal cues HHA , constant verbal cueing required  -LB      Assistive Device (Gait)  AFO;cane, quad  -LB      Distance in Feet (Gait)  40' x 3, 25' x 1   -LB      Pattern (Gait)  step-to  -LB      Deviations/Abnormal Patterns (Gait)  gait speed decreased;stride length decreased  -LB      Bilateral Gait Deviations  weight shift ability decreased  -LB      [place] Level (Stairs)  contact guard;minimum assist (75% patient effort);verbal cues  -LB      Assistive Device (Stairs)  cane, quad SBQC on R with ascending, L on descending  -LB      Handrail Location (Stairs)  left side (ascending)  -LB      Number of Steps (Stairs)  3  -LB      Ascending Technique (Stairs)  step-to-step  -LB      Descending Technique (Stairs)  step-to-step  -LB      Stairs, Safety Issues  weight-shifting ability decreased  -LB      Stairs, Impairments  strength decreased;impaired balance  -LB      [place] Level (Ramp)  contact guard  -LB      Assistive Device (Ramp)  walker, 4-wheeled  -LB      Comment (Gait/Stairs)  CURB with rollator with CGA ascending and minimal descending (to lower the walker)   -LB         Balance Skills Training    Gait Balance-Level of Assistance  Contact guard  -LB      Gait Balance Support  assistive device  -LB      Gait Balance Activities  backwards 10'  -LB         Orthotic/Prosthetic Management     "Orthosis Location  AFO (ankle foot orthosis) in place entire session  -LB        User Key  (r) = Recorded By, (t) = Taken By, (c) = Cosigned By    Initials Name Provider Type    Nalini Becker PT Physical Therapist                  PT Assessment/Plan     Row Name 03/06/20 1430          PT Assessment    Assessment Comments  Pt reporting she felt she could push through the SBQC better than her 's hand. Pt was able to lift herself up the steps with left rail and SBQC with less assistance. Pt was eager to try ambulation with the SBQC.   -LB       User Key  (r) = Recorded By, (t) = Taken By, (c) = Cosigned By    Initials Name Provider Type    Nalini Becker PT Physical Therapist             OP Exercises     Row Name 03/06/20 0931             Subjective Comments    Subjective Comments  \"I could push harder with the cane. I really like this cane. Where can I get it?\"   -LB         Subjective Pain    Able to rate subjective pain?  yes  -LB      Pre-Treatment Pain Level  0  -LB      Post-Treatment Pain Level  0  -LB        User Key  (r) = Recorded By, (t) = Taken By, (c) = Cosigned By    Initials Name Provider Type    Nalini Becker PT Physical Therapist                          Therapy Education  Education Details: Ambulation with SBQC with minimal to moderate HHA. Stairs with SBQC and one rail. PT's  observed.   Given: Mobility training  How Provided: Verbal  Provided to: Patient, Caregiver  Level of Understanding: Teach back education performed, Verbalized, Demonstrated              Time Calculation:   Start Time: 0930  Stop Time: 1015  Time Calculation (min): 45 min  Total Timed Code Minutes- PT: 45 minute(s)   Therapy Charges for Today     Code Description Service Date Service Provider Modifiers Qty    [id number]  PT NEUROMUSC RE EDUCATION EA 15 MIN 3/6/2020 Nalini Mccall, PT GP 3                    Nalini Mccall PT  3/6/2020     "

## 2020-03-09 ENCOUNTER — HOSPITAL ENCOUNTER (OUTPATIENT)
Dept: PHYSICAL THERAPY | Facility: HOSPITAL | Age: 70
Setting detail: THERAPIES SERIES
Discharge: HOME | End: 2020-03-09

## 2020-03-09 DIAGNOSIS — R26.89 FUNCTIONAL GAIT ABNORMALITY: ICD-10-CM

## 2020-03-09 DIAGNOSIS — M62.81 MUSCLE WEAKNESS (GENERALIZED): Primary | ICD-10-CM

## 2020-03-09 DIAGNOSIS — M48.061 STENOSIS OF LATERAL RECESS OF LUMBAR SPINE: ICD-10-CM

## 2020-03-09 DIAGNOSIS — Z98.890 HISTORY OF LUMBAR LAMINECTOMY FOR SPINAL CORD DECOMPRESSION: ICD-10-CM

## 2020-03-09 PROCEDURE — 97112 NEUROMUSCULAR REEDUCATION: CPT

## 2020-03-09 NOTE — THERAPY PROGRESS REPORT/RE-CERT
"    .Outpatient Physical Therapy Neuro Progress Note  [facility]     Patient Name: Baltazar MELO  : 1950  MRN: [id number]  Today's Date: 3/9/2020      Visit Date: 2020    Patient Active Problem List   Diagnosis   • Hypertension   • Morbid obesity with BMI of 40.0-44.9, adult (CMS/[facility])   • Lumbar back pain with radiculopathy affecting right lower extremity   • Diabetes mellitus (CMS/HCC)   • Renal lesion   • Paroxysmal atrial fibrillation (CMS/HCC)   • Post-operative state   • Weakness of both lower extremities        Past Medical History:   Diagnosis Date   • A-fib (CMS/HCC)    • Arthritis    • Creatinine elevation    • Diabetes mellitus (CMS/HCC)    • Elevated cholesterol    • Health care maintenance    • Hyperlipidemia    • Hypertension    • Obesity    • PVC (premature ventricular contraction)         Past Surgical History:   Procedure Laterality Date   • BACK SURGERY     • BREAST CYST EXCISION Left    • BREAST SURGERY Bilateral     Reduction   • CHOLECYSTECTOMY     • HYSTERECTOMY     • LUMBAR DISCECTOMY Left 2019    Procedure: Left Lumbar Three to Lumbar Four and Lumbar Four to Lumbar Five Decompression;  Surgeon: Abhijit Robertson MD;  Location: [facility];  Service: Neurosurgery         Visit Dx:     ICD-10-CM ICD-9-CM   1. Muscle weakness (generalized) M62.81 728.87   2. Functional gait abnormality R26.89 781.2   3. History of lumbar laminectomy for spinal cord decompression Z98.890 V45.89   4. Stenosis of lateral recess of lumbar spine M48.061 724.02               PT Neuro     Row Name 20 1100             Subjective Comments    Subjective Comments  \"I ordered the cane.\" \"I sat on the rollator seat too long and it was uncomfortable.\" \"I can tell my legs are stronger.\" \"We are going to get rails put up onto the porch.\"    -LB         Precautions and Contraindications    Precautions  Latex Allergy , spinal surgery 19 GREAT difficulty with the CURB on IE   -LB         " Subjective Pain    Able to rate subjective pain?  yes  -LB      Pre-Treatment Pain Level  0  -LB      Post-Treatment Pain Level  0  -LB         Cognition    Overall Cognitive Status  WFL  -LB      Memory  Appears intact  -LB      Orientation Level  Oriented X4  -LB      Safety Judgment  Good awareness of safety precautions  -LB      Deficits  Fully aware of deficits  -LB         Posture/Observations    Alignment Options  Forward head  -LB      Forward Head  Mild  -LB      Observations  Edema  -LB      Posture/Observations Comments  Pt arrived to PT ambulating with her rollator.   -LB         MMT Right Lower Ext    Rt Hip Flexion MMT, Gross Movement  (4+/5) good plus  -LB      Rt Hip ABduction MMT, Gross Movement  (4-/5) good minus  -LB      Rt Knee Extension MMT, Gross Movement  (5/5) normal  -LB      Rt Knee Flexion MMT, Gross Movement  -- maximal resistance in sitting  -LB      Rt Ankle Dorsiflexion MMT, Gross Movement  (5/5) normal  -LB      Rt Ankle Subtalar Inversion MT, Gross Movement  (5/5) normal  -LB      Rt Ankle Subtalar Eversion MMT, Gross Movement  (5/5) normal  -LB      Rt MTP Flexion MMT, Gross Movement  (4/5) good  -LB      Rt MTP Extension MMT, Gross Movement  (4/5) good  -LB         MMT Left Lower Ext    Lt Hip Flexion MMT, Gross Movement  (4+/5) good plus  -LB      Lt Hip ABduction MMT, Gross Movement  (3+/5) fair plus  -LB      Lt Knee Extension MMT, Gross Movement  (5/5) normal  -LB      Lt Knee Flexion MMT, Gross Movement  -- maximal resistance in sitting   -LB      Lt Ankle Dorsiflexion MMT, Gross Movement  (4+/5) good plus  -LB      Lt Ankle Subtalar Inversion MMT, Gross Movement  (4+/5) good plus  -LB      Lt Ankle Subtalar Eversion MMT, Gross Movement  (3+/5) fair plus  -LB      Lt MTP Flexion MMT, Gross Movement  (3+/5) fair plus  -LB      Lt MTP Extension MMT, Gross Movement  (3+/5) fair plus  -LB         Bed Mobility Assessment/Treatment    Rolling Left [place] (Bed Mobility)   independent  -LB      Rolling Right [place] (Bed Mobility)  independent  -LB      Supine-Sit [place] (Bed Mobility)  independent  -LB      Sit-Supine [place] (Bed Mobility)  independent  -LB      Comment (Bed Mobility)  Pt reports she was able roll to her stomach   -LB         Transfers    Sit-Stand [place] (Transfers)  contact guard;verbal cues  -LB      Stand-Sit [place] (Transfers)  contact guard;verbal cues  -LB      Transfers, Sit-Stand-Sit, Assist Device  quad cane SBQC   -LB      Transfer, Safety Issues  other (see comments) definite need of UE's with to stand  -LB      Comment (Transfers)  Pt comes to stand and to sit with rollator independent with minimal use of UE's.   -LB         Gait/Stairs Assessment/Training    [place] Level (Gait)  minimum assist (75% patient effort);moderate assist (50% patient effort);verbal cues  -LB      Assistive Device (Gait)  AFO;cane, quad  -LB      Distance in Feet (Gait)  40' x 2   -LB      Pattern (Gait)  step-to  -LB      Deviations/Abnormal Patterns (Gait)  gait speed decreased;stride length decreased  -LB      Bilateral Gait Deviations  weight shift ability decreased  -LB      [place] Level (Stairs)  contact guard;minimum assist (75% patient effort);verbal cues  -LB      Assistive Device (Stairs)  cane, quad SBQC on R with ascending, L on descending  -LB      Handrail Location (Stairs)  left side (ascending)  -LB      Number of Steps (Stairs)  3  -LB      Ascending Technique (Stairs)  step-to-step  -LB      Descending Technique (Stairs)  step-to-step  -LB      Stairs, Safety Issues  weight-shifting ability decreased  -LB      Stairs, Impairments  strength decreased;impaired balance  -LB      Comment (Gait/Stairs)  On the 2nd step the SBQC tipped inward slightly as pt did not push straight down into the cane.   -LB         Balance Skills Training    Balance Comments  see Dynamic Gait Index   -LB         Orthotic/Prosthetic Management     Orthosis Location  AFO (ankle foot orthosis)  -LB         Ankle Foot Orthosis Management    Type (Ankle/Foot Orthosis)  -- posterior lateral strut AFO (Thuasne  -LB      Wearing Schedule (Ankle Foot Orthosis)  wear with activity/work  -LB      Orthosis Training (Ankle Foot Orthosis)  patient  -LB        User Key  (r) = Recorded By, (t) = Taken By, (c) = Cosigned By    Initials Name Provider Type    LB Nalini Mccall, PT Physical Therapist                  Therapy Education  Education Details: Advised pt not to ambulate with the SBQC at home. Also, not to perform stairs with SBQC with her  at this time.   Given: Fall prevention and home safety  How Provided: Verbal  Provided to: Patient, Caregiver  Level of Understanding: Teach back education performed, Verbalized, Demonstrated    PT OP Goals     Row Name 03/09/20 1100          PT Short Term Goals    STG 1  Pt to come to stand from armless chair to rolling walker with both hands on the chair seat.   -LB     STG 1 Progress  Met  -LB     STG 2  Pt to increase strength in her bilateral hip abductors to 3+/5.  -LB     STG 2 Progress  Met  -LB     STG 3  Pt to increase strength ankle musculature by 1/2 grade overall.   -LB     STG 3 Progress  Met  -LB     STG 4  Pt to ascend and descend a curb with rolling walker with CGA to minimal of 1.   -LB     STG 4 Progress  Met  -LB     STG 5  Pt to ambulate with ~ 80' x 3 in one session with rolling walker.   -LB     STG 5 Progress  Met  -LB     STG 6  Pt to improve score to 18/28 on the [place] to improve balance and reduce risk of falls.   -LB     STG 6 Progress  Met  -LB     STG 7  Pt to be evaluated on stairs.   -LB     STG 7 Progress  Met  -LB     STG 8  Pt to perform 3 steps with one rail with moderate of PT.  -LB     STG 8 Progress  Met  -LB     STG 9  Pt to perform transfers to and from the rollator seat safely and independently.  -LB     STG 9 Progress  Met  -LB     STG 10  Pt to ascend and descend a ramp with the  C-Lever rollator with SBA.   -LB     STG 10 Progress  Progressing;Ongoing  -LB     STG 10 Progress Comments  CGA   -LB        Long Term Goals    LTG Date to Achieve  04/22/20  -LB     LTG 1  Pt to increase strength in her bilateral hip abductors to 4/5.  -LB     LTG 1 Progress  Progressing;Ongoing  -LB     LTG 2  Pt to increase strength ankle musculature by 1 grade overall  -LB     LTG 2 Progress  Met  -LB     LTG 2 Progress Comments  Pt to ambulate ~ 70' x 2 with SBQC with CGA.  (new)   -LB     LTG 3  Pt to improve score to 21/28 on the [place] to improve balance and reduce risk of falls.   -LB     LTG 3 Progress  Met  -LB     LTG 4  Pt to ambulate ~ 200' x 3 conditional independent with least restrictive device  -LB     LTG 4 Progress  Progressing;Ongoing  -LB     LTG 5  Pt to perform knee flexion in sitting with maximal resistance.   -LB     LTG 5 Progress  Met  -LB     LTG 6  Pt to ambulate with increased wt shift forward and increased pushoff   -LB     LTG 6 Progress  Progressing;Ongoing  -LB     LTG 7  Pt to ascend and descend a curb conditional independent with least restrictive device.   -LB     LTG 7 Progress  Progressing;Ongoing  -LB     LTG 7 Progress Comments  CGA  -LB     LTG 8  Pt to be independent with HEP with emphasis on LE strengthening.   -LB     LTG 8 Progress  Met  -LB     LTG 9  Pt to complete the TUG in < 14 seconds with least restricitve device.   -LB     LTG 9 Progress  Goal Revised  -LB     LTG 9 Progress Comments  Pt to complete the TUG in < 40 seconds while ambualting with the SBQC  -LB     LTG 10  Pt to ascend and descend 3 steps with a rail with moderate assistancei of her .   -LB     LTG 10 Progress  Goal Revised  -LB     LTG 10 Progress Comments  Pt to ascend and descend 3 steps with one rail and SBQC with CGA from her .   -LB        Time Calculation    PT Goal Re-Cert Due Date  04/08/20  -LB       User Key  (r) = Recorded By, (t) = Taken By, (c) = Cosigned By     "Initials Name Provider Type    Nalini Becker PT Physical Therapist          PT Assessment/Plan     Row Name 03/09/20 1605          PT Assessment    Assessment Comments  Pt demonstrated increased strength in bilateral hip abductors. Pt was introduced to the SBQC with ambulation with moderate HHA and for use on the steps with one rail. Pt has made signifcant improvement on steps with various techniques. Pt is motivated to advance to a SBQC with ambulation. Pt would benefit from continued PT with emphasis on ambulation with a SBQC on level surface on steps.     -LB       User Key  (r) = Recorded By, (t) = Taken By, (c) = Cosigned By    Initials Name Provider Type    Nalini Becker PT Physical Therapist             OP Exercises     Row Name 03/09/20 1100             Subjective Comments    Subjective Comments  \"I ordered the cane.\" \"I sat on the rollator seat too long and it was uncomfortable.\" \"I can tell my legs are stronger.\" \"We are going to get rails put up onto the porch.\"    -LB         Subjective Pain    Able to rate subjective pain?  yes  -LB      Pre-Treatment Pain Level  0  -LB      Post-Treatment Pain Level  0  -LB        User Key  (r) = Recorded By, (t) = Taken By, (c) = Cosigned By    Initials Name Provider Type    Nalini Becker PT Physical Therapist                         Dynamic Gait Index (DGI)  Gait Level Surface: Mild impairment: walks 20’, uses assistive devices, slower speed, mild gait deviations  Change in Gait Speed: Mild impairment: Is able to change speed but demonstrates mild gait deviations, or no gait deviations but unable to achieve a significant change in velocity, or uses as assistive device  Gait with Horizontal Head Turns: Mild impairment: Performs head turns smoothly with slight change in gait velocity, i.e. minor disruption to smooth gait path or uses walking aid.  Gait with Vertical Head Turns: Mild impairment: Performs head turns smoothly with slight change in gait " velocity, i.e. minor disruption to smooth gait path or uses walking aid.  Gait and Pivot Turn: Moderate impairment: Turns slowly, requires verbal cueing, requires several small steps to catch balance following turn and stop.  Step Over Obstacle: Severe impairment: Cannot perform without assistance.  Step Around Obstacles: Moderate impairment: Is able to clear cones but must significantly slow speed to accomplish task, or requires verbal cueing.  Steps: Severe impairment: Cannot do safely.  Dynamic Gait Index Score: 10  Dynamic Gait Index Comments: high risk for falls with rolling walker   Timed Up and Go (TUG)  TUG Test 1: 19 seconds(with rollator)  TUG Test 2: 67 seconds  Timed Up and Go Comments: 2nd walk with SBQC with mod HHA     Time Calculation:   Start Time: 1100  Stop Time: 1145  Time Calculation (min): 45 min  Total Timed Code Minutes- PT: 45 minute(s)   Therapy Charges for Today     Code Description Service Date Service Provider Modifiers Qty    31028800200 HC PT NEUROMUSC RE EDUCATION EA 15 MIN 3/9/2020 Nalini Mccall, PT GP 3          PT G-Codes  TUG Test 1: 19 seconds(with rollator)  TUG Test 2: 67 seconds         Nalini Mccall, PT  3/9/2020

## 2020-03-13 ENCOUNTER — HOSPITAL ENCOUNTER (OUTPATIENT)
Dept: PHYSICAL THERAPY | Facility: HOSPITAL | Age: 70
Setting detail: THERAPIES SERIES
Discharge: HOME | End: 2020-03-13

## 2020-03-13 DIAGNOSIS — M48.061 STENOSIS OF LATERAL RECESS OF LUMBAR SPINE: ICD-10-CM

## 2020-03-13 DIAGNOSIS — Z98.890 HISTORY OF LUMBAR LAMINECTOMY FOR SPINAL CORD DECOMPRESSION: ICD-10-CM

## 2020-03-13 DIAGNOSIS — M62.81 MUSCLE WEAKNESS (GENERALIZED): Primary | ICD-10-CM

## 2020-03-13 DIAGNOSIS — R26.89 FUNCTIONAL GAIT ABNORMALITY: ICD-10-CM

## 2020-03-13 PROCEDURE — 97112 NEUROMUSCULAR REEDUCATION: CPT

## 2020-03-13 NOTE — THERAPY TREATMENT NOTE
"    Outpatient Physical Therapy Neuro Treatment Note  [facility]     Patient Name: Baltazar MELO  : 1950  MRN: [id number]  Today's Date: 3/13/2020      Visit Date: 2020    Visit Dx:    ICD-10-CM ICD-9-CM   1. Muscle weakness (generalized) M62.81 728.87   2. Functional gait abnormality R26.89 781.2   3. History of lumbar laminectomy for spinal cord decompression Z98.890 V45.89   4. Stenosis of lateral recess of lumbar spine M48.061 724.02       Patient Active Problem List   Diagnosis   • Hypertension   • Morbid obesity with BMI of 40.0-44.9, adult (CMS/HCC)   • Lumbar back pain with radiculopathy affecting right lower extremity   • Diabetes mellitus (CMS/[facility])   • Renal lesion   • Paroxysmal atrial fibrillation (CMS/[facility])   • Post-operative state   • Weakness of both lower extremities           PT Neuro     Row Name 20 0930             Subjective Comments    Subjective Comments  \"That was easier.\" (referring to the steps) \"It feel differnet without the AFO.\"   -LB         Precautions and Contraindications    Precautions  Latex Allergy , spinal surgery 19 GREAT difficulty with the CURB on IE   -LB         Subjective Pain    Able to rate subjective pain?  yes  -LB      Pre-Treatment Pain Level  0  -LB      Post-Treatment Pain Level  0  -LB         Cognition    Overall Cognitive Status  WFL  -LB      Memory  Appears intact  -LB      Orientation Level  Oriented X4  -LB      Safety Judgment  Good awareness of safety precautions  -LB      Deficits  Fully aware of deficits  -LB         Posture/Observations    Alignment Options  Forward head  -LB      Forward Head  Mild  -LB      Observations  Edema  -LB      Posture/Observations Comments  Pt arrived to PT ambulating with her rollator and wearing her AFO. Pt carrying her new SBQC.   -LB         Bed Mobility Assessment/Treatment    Comment (Bed Mobility)  not performed  -LB         Transfers    Sit-Stand [place] (Transfers)  contact " guard;verbal cues  -LB      Stand-Sit [place] (Transfers)  contact guard;verbal cues  -LB      Transfers, Sit-Stand-Sit, Assist Device  quad cane SBQC   -LB      Transfer, Safety Issues  other (see comments) definite need of UE's with to stand  -LB      Comment (Transfers)  Pt comes to stand from an armless chair to her rollator conditional independent.   -LB         Gait/Stairs Assessment/Training    [place] Level (Gait)  minimum assist (75% patient effort);moderate assist (50% patient effort) L HHA   -LB      Assistive Device (Gait)  AFO;cane, quad  -LB      Distance in Feet (Gait)  50' x 2, 40' with SBQC Pt ambulated ~ 70' x 2 with her rollator without her AFO with her diabetic shoes with custom molded foot orthotics.  -LB      Pattern (Gait)  step-to pt advancing her R LE slightly past L LE  -LB      Deviations/Abnormal Patterns (Gait)  gait speed decreased;stride length decreased  -LB      Bilateral Gait Deviations  weight shift ability decreased  -LB      [place] Level (Stairs)  contact guard;minimum assist (75% patient effort);verbal cues  -LB      Assistive Device (Stairs)  cane, quad SBQC on R with ascending, L on descending  -LB      Handrail Location (Stairs)  left side (ascending)  -LB      Number of Steps (Stairs)  3  -LB      Ascending Technique (Stairs)  step-to-step  -LB      Descending Technique (Stairs)  step-to-step  -LB      Stairs, Safety Issues  weight-shifting ability decreased  -LB      Stairs, Impairments  strength decreased;impaired balance  -LB      Comment (Gait/Stairs)  Pt's  assisting pt and PT providing CGA and verbal cues.  -LB         Orthotic/Prosthetic Management    Orthosis Location  AFO (ankle foot orthosis)  -LB         Ankle Foot Orthosis Management    Type (Ankle/Foot Orthosis)  --  posterior lateral strut AFO (Thuasne  -LB      Wearing Schedule (Ankle Foot Orthosis)  wear with activity/work  -LB      Orthosis Training (Ankle Foot Orthosis)  patient  -LB   "      User Key  (r) = Recorded By, (t) = Taken By, (c) = Cosigned By    Initials Name Provider Type    Nalini Becker PT Physical Therapist                  PT Assessment/Plan     Row Name 03/13/20 1554          PT Assessment    Assessment Comments  Pt and her  were able to perform steps with one rail and SBQC with good technique with minimal verbal cues. Pt requires consistent min to mod tof 1 with ambulation with the SBQC. Pt was able to ambulate with her rolling walker without her AFO while wearing her diabetic shoes with custom foot orthotics.   -LB       User Key  (r) = Recorded By, (t) = Taken By, (c) = Cosigned By    Initials Name Provider Type    Nalini Becker PT Physical Therapist             OP Exercises     Row Name 03/13/20 0930             Subjective Comments    Subjective Comments  \"That was easier.\" (referring to the steps) \"It feel differnet without the AFO.\"   -LB         Subjective Pain    Able to rate subjective pain?  yes  -LB      Pre-Treatment Pain Level  0  -LB      Post-Treatment Pain Level  0  -LB        User Key  (r) = Recorded By, (t) = Taken By, (c) = Cosigned By    Initials Name Provider Type    Nalini Becker PT Physical Therapist                          Therapy Education  Education Details: Pt was instructed in assisting pt with ascending and descending 3 steps with one rail and SBQC.   Given: Mobility training  How Provided: Verbal  Provided to: Patient  Level of Understanding: Teach back education performed, Verbalized, Demonstrated              Time Calculation:   Start Time: 0930  Stop Time: 1015  Time Calculation (min): 45 min  Total Timed Code Minutes- PT: 45 minute(s)   Therapy Charges for Today     Code Description Service Date Service Provider Modifiers Qty    [id number]  PT NEUROMUSC RE EDUCATION EA 15 MIN 3/13/2020 Nalini Mccall PT GP 3                    Nalini THORNE. EARNESTINE Mccall  3/13/2020     "

## 2020-03-16 ENCOUNTER — HOSPITAL ENCOUNTER (OUTPATIENT)
Dept: PHYSICAL THERAPY | Facility: HOSPITAL | Age: 70
Setting detail: THERAPIES SERIES
Discharge: HOME | End: 2020-03-16

## 2020-03-16 DIAGNOSIS — R26.89 FUNCTIONAL GAIT ABNORMALITY: Primary | ICD-10-CM

## 2020-03-16 DIAGNOSIS — M62.81 MUSCLE WEAKNESS (GENERALIZED): ICD-10-CM

## 2020-03-16 DIAGNOSIS — Z98.890 HISTORY OF LUMBAR LAMINECTOMY FOR SPINAL CORD DECOMPRESSION: ICD-10-CM

## 2020-03-16 PROCEDURE — 97116 GAIT TRAINING THERAPY: CPT

## 2020-03-16 PROCEDURE — 97530 THERAPEUTIC ACTIVITIES: CPT

## 2020-03-16 NOTE — THERAPY TREATMENT NOTE
"    Outpatient Physical Therapy Neuro Treatment Note  [facility]     Patient Name: Baltazar MELO  : 1950  MRN: [id number]  Today's Date: 3/16/2020      Visit Date: 2020    Visit Dx:    ICD-10-CM ICD-9-CM   1. Functional gait abnormality R26.89 781.2   2. Muscle weakness (generalized) M62.81 728.87   3. History of lumbar laminectomy for spinal cord decompression Z98.890 V45.89       Patient Active Problem List   Diagnosis   • Hypertension   • Morbid obesity with BMI of 40.0-44.9, adult (CMS/HCC)   • Lumbar back pain with radiculopathy affecting right lower extremity   • Diabetes mellitus (CMS/HCC)   • Renal lesion   • Paroxysmal atrial fibrillation (CMS/HCC)   • Post-operative state   • Weakness of both lower extremities           PT Neuro     Row Name 20 1400 20 1115          Subjective Comments    Subjective Comments  --  \"My family wasnt sure about me coming here\" - anxious re virus  -KP        Precautions and Contraindications    Precautions/Limitations  --  fall precautions  -KP     Precautions  --  Latex Allergy , spinal surgery 19 GREAT difficulty with the CURB on IE   -KP        Subjective Pain    Able to rate subjective pain?  --  yes  -KP     Pre-Treatment Pain Level  --  0  -KP     Post-Treatment Pain Level  --  0  -KP        Cognition    Overall Cognitive Status  --  WFL  -KP     Memory  --  Appears intact  -KP     Orientation Level  --  Oriented X4  -KP     Safety Judgment  --  Good awareness of safety precautions  -KP     Deficits  --  Fully aware of deficits  -KP        Posture/Observations    Posture/Observations Comments  Pt arrived to PT ambulating with her rollator and wearing her AFO. Pt carrying her new SBQC.   -KP  --        Transfers    Sit-Stand [place] (Transfers)  contact guard;stand by assist;verbal cues  -KP  --     Stand-Sit [place] (Transfers)  contact guard;stand by assist;verbal cues  -KP  --     Transfers, Sit-Stand-Sit, Assist Device  " rolling walker;quad cane  -  --     Transfer, Safety Issues  balance decreased during turns Definite need of UEs  -  --     Comment (Transfers)  Mod indep to rollator  -  --        Gait/Stairs Assessment/Training    [place] Level (Gait)  minimum assist (75% patient effort);moderate assist (50% patient effort);verbal cues  -  --     Assistive Device (Gait)  AFO;cane, quad SBQC  -  --     Distance in Feet (Gait)  50 x 2, 30  -  --     Pattern (Gait)  step-to  -  --     Deviations/Abnormal Patterns (Gait)  gait speed decreased;stride length decreased  -  --     Bilateral Gait Deviations  weight shift ability decreased guarded, dec knee flex with swing, HHA , dec push off  -  --     Comment (Gait/Stairs)  Per pt, has been walking with spouse with SBQC at home.  Attmpted without HHA on last walk and pt gait was the same.  -  --        Balance Skills Training    Gait Balance-Level of Assistance  Contact guard  -  --     Gait Balance Support  Right upper extremity supported;parallel bars  -  --     Gait Balance Activities  backwards;side-stepping no AFO  -  --        Ankle Foot Orthosis Management    Wearing Schedule (Ankle Foot Orthosis)  wear with activity/work  -  --     Orthosis Training (Ankle Foot Orthosis)  patient;caregiver  -  --     Compliance/Wearing Issues (Ankle Foot Orthosis)  patient/caregiver comprehend strategies  -  --       User Key  (r) = Recorded By, (t) = Taken By, (c) = Cosigned By    Initials Name Provider Type     Nicole Fields, PT Physical Therapist                  PT Assessment/Plan     Row Name 03/16/20 7071          PT Assessment    Functional Limitations  Decreased safety during functional activities;Impaired gait;Performance in self-care ADL;Limitations in community activities;Limitation in home management  -     Impairments  Balance;Endurance;Gait;Muscle strength;Sensation;Pain  -     Assessment Comments  Pt reports that she felt surprisingly  "good without HHA an SBQC.  ENcouraged to dec lean on rollator.    -KP       User Key  (r) = Recorded By, (t) = Taken By, (c) = Cosigned By    Initials Name Provider Type    Nicole Ledesma PT Physical Therapist             OP Exercises     Row Name 03/16/20 1115             Subjective Comments    Subjective Comments  \"My family wasnt sure about me coming here\" - anxious re virus  -KP         Subjective Pain    Able to rate subjective pain?  yes  -KP      Pre-Treatment Pain Level  0  -KP      Post-Treatment Pain Level  0  -KP         Exercise 1    Exercise Name 1  gastroc stretch on incline at end of ll bars with B UE support wearing her shoes w/o orthotic or AFO performed before balance activities  -KP      Cueing 1  Verbal;Tactile  -KP      Reps 1  1  -KP      Time 1  35  -KP         Exercise 3    Exercise Name 3  AROM / RROM  bilateral ankles in all planes   -KP      Cueing 3  Verbal;Tactile  -KP      Reps 3  10  -KP         Exercise 7    Exercise Name 7  knee flexion each LE in standing within ll bars   -KP      Cueing 7  Verbal;Tactile  -KP      Reps 7  10  -KP        User Key  (r) = Recorded By, (t) = Taken By, (c) = Cosigned By    Initials Name Provider Type    Nicole Ledesma PT Physical Therapist                          Therapy Education  Education Details: Worked gait with no HHA.  Instr on plan if pt is unable to come to PT.  Given: Mobility training  Program: New, Reinforced  How Provided: Verbal  Provided to: Patient  Level of Understanding: Teach back education performed, Verbalized, Demonstrated              Time Calculation:   Start Time: 1100  Stop Time: 1145  Time Calculation (min): 45 min   Therapy Charges for Today     Code Description Service Date Service Provider Modifiers Qty    20257043301 HC GAIT TRAINING EA 15 MIN 3/16/2020 Nicole Fields PT GP 2    79909514188 HC PT THERAPEUTIC ACT EA 15 MIN 3/16/2020 Nicole Fields PT GP 1                    Nicole Fields, " PT  3/16/2020

## 2020-03-20 ENCOUNTER — HOSPITAL ENCOUNTER (OUTPATIENT)
Dept: PHYSICAL THERAPY | Facility: HOSPITAL | Age: 70
Setting detail: THERAPIES SERIES
Discharge: HOME | End: 2020-03-20

## 2020-03-20 PROCEDURE — 97116 GAIT TRAINING THERAPY: CPT

## 2020-03-20 PROCEDURE — 97530 THERAPEUTIC ACTIVITIES: CPT

## 2020-03-20 NOTE — THERAPY TREATMENT NOTE
Outpatient Physical Therapy Neuro Treatment Note  [facility]     Patient Name: Baltazar MELO  : 1950  MRN: [id number]  Today's Date: 3/20/2020      Visit Date: 2020    Visit Dx:  No diagnosis found.    Patient Active Problem List   Diagnosis   • Hypertension   • Morbid obesity with BMI of 40.0-44.9, adult (CMS/HCC)   • Lumbar back pain with radiculopathy affecting right lower extremity   • Diabetes mellitus (CMS/HCC)   • Renal lesion   • Paroxysmal atrial fibrillation (CMS/HCC)   • Post-operative state   • Weakness of both lower extremities           PT Neuro     Row Name 20 1011             Subjective Comments    Subjective Comments  Pt reports that they are being very cautious re virus.  Amb a bit more at home.    -KP         Precautions and Contraindications    Precautions/Limitations  fall precautions  -KP      Precautions  Latex Allergy , spinal surgery 19 GREAT difficulty with the CURB on IE   -KP         Subjective Pain    Able to rate subjective pain?  yes  -KP      Pre-Treatment Pain Level  0  -KP      Post-Treatment Pain Level  0  -KP         Cognition    Overall Cognitive Status  WFL  -KP      Memory  Appears intact  -KP      Orientation Level  Oriented X4  -KP      Safety Judgment  Good awareness of safety precautions  -KP      Deficits  Fully aware of deficits  -KP         Posture/Observations    Posture/Observations Comments  Ot arrived to PT with [place] [address] rollator and SBQC.  Spouse accompanies.  -KP         Transfers    Sit-Stand [place] (Transfers)  contact guard;stand by assist;verbal cues  -KP      Stand-Sit [place] (Transfers)  contact guard;verbal cues  -KP      Transfers, Sit-Stand-Sit, Assist Device  quad cane SBQC  -KP      Transfer, Safety Issues  balance decreased during turns relies on UEs  -KP      Comment (Transfers)  Mod indep to rollator  -KP         Gait/Stairs Assessment/Training    [place] Level (Gait)  minimum assist (75% patient  effort);verbal cues  -      Assistive Device (Gait)  AFO;cane, quad SBQC  -KP      Distance in Feet (Gait)  80, 60, 50, 25  -KP      Pattern (Gait)  step-to  -KP      Deviations/Abnormal Patterns (Gait)  gait speed decreased;stride length decreased  -KP      Bilateral Gait Deviations  weight shift ability decreased  -KP      Left Sided Gait Deviations  heel strike decreased Mild ER, dec knee/hip lfex in swing  -KP      Right Sided Gait Deviations  -- step to pattern, cue to pass L  -KP      Comment (Gait/Stairs)  Pt has been amb with spouse at home  - will cont to progreess as able.  -         Ankle Foot Orthosis Management    Wearing Schedule (Ankle Foot Orthosis)  wear with activity/work  -      Orthosis Training (Ankle Foot Orthosis)  patient and caregiver  -      Compliance/Wearing Issues (Ankle Foot Orthosis)  patient/caregiver comprehend strategies  -        User Key  (r) = Recorded By, (t) = Taken By, (c) = Cosigned By    Initials Name Provider Type    Nicole Ledesma PT Physical Therapist                  PT Assessment/Plan     Row Name 03/20/20 1223          PT Assessment    Functional Limitations  Decreased safety during functional activities;Impaired gait;Performance in self-care ADL;Limitations in community activities;Limitation in home management  -     Impairments  Balance;Endurance;Gait;Muscle strength;Sensation;Pain  -     Assessment Comments  Pt feeling more confident with gait on SBQC - quality is actually better without HHA.  SPouse and pt advised on activity to contiue to progress activity at home in event of therapy cancels.    -       User Key  (r) = Recorded By, (t) = Taken By, (c) = Cosigned By    Initials Name Provider Type    Nicole Ledesma PT Physical Therapist             OP Exercises     Row Name 03/20/20 1011             Subjective Comments    Subjective Comments  Pt reports that they are being very cautious re virus.  Amb a bit more at home.    -          Subjective Pain    Able to rate subjective pain?  yes  -      Pre-Treatment Pain Level  0  -KP      Post-Treatment Pain Level  0  -         Exercise 7    Exercise Name 7  knee flexion each LE in standing within ll bars   -      Cueing 7  Verbal;Tactile  -      Reps 7  10  -         Exercise 11    Exercise Name 11  HEP for Hip flex, hip ABD, heel raises, hip ext, glut sets with written instr given  -      Cueing 11  Verbal;Demo  -      Reps 11  1  -KP        User Key  (r) = Recorded By, (t) = Taken By, (c) = Cosigned By    Initials Name Provider Type    Nicole Ledesma, PT Physical Therapist                                         Time Calculation:   Start Time: 0930  Stop Time: 1015  Time Calculation (min): 45 min   Therapy Charges for Today     Code Description Service Date Service Provider Modifiers Qty    88451744518  GAIT TRAINING EA 15 MIN 3/20/2020 Nicole Fields, PT GP 1    [id number]  PT THERAPEUTIC ACT EA 15 MIN 3/20/2020 Nicole Fields, PT GP 2                    Nicole Fields PT  3/20/2020

## 2020-03-23 ENCOUNTER — APPOINTMENT (OUTPATIENT)
Dept: PHYSICAL THERAPY | Facility: HOSPITAL | Age: 70
End: 2020-03-23

## 2020-03-27 ENCOUNTER — APPOINTMENT (OUTPATIENT)
Dept: PHYSICAL THERAPY | Facility: HOSPITAL | Age: 70
End: 2020-03-27

## 2020-03-30 ENCOUNTER — APPOINTMENT (OUTPATIENT)
Dept: PHYSICAL THERAPY | Facility: HOSPITAL | Age: 70
End: 2020-03-30

## 2020-04-03 ENCOUNTER — APPOINTMENT (OUTPATIENT)
Dept: PHYSICAL THERAPY | Facility: HOSPITAL | Age: 70
End: 2020-04-03

## 2020-04-06 ENCOUNTER — APPOINTMENT (OUTPATIENT)
Dept: PHYSICAL THERAPY | Facility: HOSPITAL | Age: 70
End: 2020-04-06

## 2020-04-10 ENCOUNTER — APPOINTMENT (OUTPATIENT)
Dept: PHYSICAL THERAPY | Facility: HOSPITAL | Age: 70
End: 2020-04-10

## 2020-04-13 ENCOUNTER — APPOINTMENT (OUTPATIENT)
Dept: PHYSICAL THERAPY | Facility: HOSPITAL | Age: 70
End: 2020-04-13

## 2020-04-17 ENCOUNTER — APPOINTMENT (OUTPATIENT)
Dept: PHYSICAL THERAPY | Facility: HOSPITAL | Age: 70
End: 2020-04-17

## 2020-04-20 ENCOUNTER — APPOINTMENT (OUTPATIENT)
Dept: PHYSICAL THERAPY | Facility: HOSPITAL | Age: 70
End: 2020-04-20

## 2020-04-24 ENCOUNTER — APPOINTMENT (OUTPATIENT)
Dept: PHYSICAL THERAPY | Facility: HOSPITAL | Age: 70
End: 2020-04-24

## 2020-05-15 DIAGNOSIS — I10 ESSENTIAL HYPERTENSION: ICD-10-CM

## 2020-05-18 RX ORDER — CARVEDILOL 6.25 MG/1
TABLET ORAL
Qty: 180 TABLET | Refills: 0 | Status: SHIPPED | OUTPATIENT
Start: 2020-05-18 | End: 2020-06-30 | Stop reason: DRUGHIGH

## 2020-05-20 ENCOUNTER — TELEMEDICINE (OUTPATIENT)
Dept: CARDIOLOGY | Facility: CLINIC | Age: 70
End: 2020-05-20

## 2020-05-20 VITALS
WEIGHT: 225 LBS | DIASTOLIC BLOOD PRESSURE: 80 MMHG | BODY MASS INDEX: 37.49 KG/M2 | OXYGEN SATURATION: 98 % | TEMPERATURE: 97.9 F | HEIGHT: 65 IN | SYSTOLIC BLOOD PRESSURE: 158 MMHG | HEART RATE: 72 BPM

## 2020-05-20 DIAGNOSIS — I48.0 PAROXYSMAL ATRIAL FIBRILLATION (HCC): ICD-10-CM

## 2020-05-20 DIAGNOSIS — I10 ESSENTIAL HYPERTENSION: Primary | ICD-10-CM

## 2020-05-20 DIAGNOSIS — E11.59 TYPE 2 DIABETES MELLITUS WITH OTHER CIRCULATORY COMPLICATION, WITHOUT LONG-TERM CURRENT USE OF INSULIN (HCC): ICD-10-CM

## 2020-05-20 DIAGNOSIS — E66.01 MORBID OBESITY WITH BMI OF 40.0-44.9, ADULT (HCC): ICD-10-CM

## 2020-05-20 PROCEDURE — 99214 OFFICE O/P EST MOD 30 MIN: CPT | Performed by: INTERNAL MEDICINE

## 2020-05-20 RX ORDER — LACTOBACILLUS RHAMNOSUS GG 2B CELL/.4
DROPS ORAL
COMMUNITY

## 2020-05-20 RX ORDER — FLAXSEED
POWDER (GRAM) ORAL
COMMUNITY

## 2020-05-20 RX ORDER — PYRIDOXINE HCL (VITAMIN B6) 100 MG
TABLET ORAL
COMMUNITY

## 2020-05-20 NOTE — PROGRESS NOTES
She is a 70-year-old -American woman with hypertension and she is here for a telemedicine video visit that she has consented to.    She has also had asymptomatic paroxysmal A. fib and we have taken a rate control and anticoagulation approach with her she is been doing well with that she has not had any tachypalpitations and no bleeding difficulty.    Her blood pressures a little bit up today and really for the first time in a long time she said I think I might be more medicine she you know we have talked in depth about sodium intake and trying to be active.  She had surgery on her back and it really left her kind of a little bit debilitated but now she is able to walk with a cane which is really good for her.    Today and then increase her carvedilol to 12-1/2 twice a day see how she does I will have her come back and see Gely in 6 months and then see me in a year and I spent 15 minutes on the phone with her and another 10 minutes of charting

## 2020-06-30 DIAGNOSIS — I10 ESSENTIAL HYPERTENSION: ICD-10-CM

## 2020-06-30 RX ORDER — CARVEDILOL 6.25 MG/1
6.25 TABLET ORAL 2 TIMES DAILY
Qty: 180 TABLET | Refills: 3 | Status: SHIPPED | OUTPATIENT
Start: 2020-06-30 | End: 2020-06-30 | Stop reason: DRUGHIGH

## 2020-06-30 RX ORDER — CARVEDILOL 12.5 MG/1
12.5 TABLET ORAL 2 TIMES DAILY
Qty: 180 TABLET | Refills: 3 | Status: SHIPPED | OUTPATIENT
Start: 2020-06-30 | End: 2021-06-14

## 2020-06-30 RX ORDER — CARVEDILOL 12.5 MG/1
6.25 TABLET ORAL 2 TIMES DAILY
Qty: 180 TABLET | Refills: 2 | OUTPATIENT
Start: 2020-06-30

## 2020-07-03 ENCOUNTER — DOCUMENTATION (OUTPATIENT)
Dept: PHYSICAL THERAPY | Facility: HOSPITAL | Age: 70
End: 2020-07-03

## 2020-07-03 DIAGNOSIS — M48.061 STENOSIS OF LATERAL RECESS OF LUMBAR SPINE: ICD-10-CM

## 2020-07-03 DIAGNOSIS — M62.81 MUSCLE WEAKNESS (GENERALIZED): ICD-10-CM

## 2020-07-03 DIAGNOSIS — R26.89 FUNCTIONAL GAIT ABNORMALITY: Primary | ICD-10-CM

## 2020-07-03 DIAGNOSIS — Z98.890 HISTORY OF LUMBAR LAMINECTOMY FOR SPINAL CORD DECOMPRESSION: ICD-10-CM

## 2020-07-03 NOTE — THERAPY DISCHARGE NOTE
Outpatient Physical Therapy Neuro Evaluation/Discharge       Patient Name: Baltazar MELO  : 1950  MRN: [id number]  Today's Date: 7/3/2020      Visit Date: 2020    Patient Active Problem List   Diagnosis   • Hypertension   • Morbid obesity with BMI of 40.0-44.9, adult (CMS/HCC)   • Lumbar back pain with radiculopathy affecting right lower extremity   • Diabetes mellitus (CMS/HCC)   • Renal lesion   • Paroxysmal atrial fibrillation (CMS/HCC)   • Post-operative state   • Weakness of both lower extremities        Past Medical History:   Diagnosis Date   • A-fib (CMS/HCC)    • Arthritis    • Creatinine elevation    • Diabetes mellitus (CMS/HCC)    • Elevated cholesterol    • Health care maintenance    • Hyperlipidemia    • Hypertension    • Obesity    • PVC (premature ventricular contraction)         Past Surgical History:   Procedure Laterality Date   • BACK SURGERY     • BREAST CYST EXCISION Left    • BREAST SURGERY Bilateral     Reduction   • CHOLECYSTECTOMY     • HYSTERECTOMY     • LUMBAR DISCECTOMY Left 2019    Procedure: Left Lumbar Three to Lumbar Four and Lumbar Four to Lumbar Five Decompression;  Surgeon: Abhijit Robertson MD;  Location: [facility];  Service: Neurosurgery         Visit Dx:     ICD-10-CM ICD-9-CM   1. Functional gait abnormality R26.89 781.2   2. Muscle weakness (generalized) M62.81 728.87   3. History of lumbar laminectomy for spinal cord decompression Z98.890 V45.89   4. Stenosis of lateral recess of lumbar spine M48.061 724.02                                PT OP Goals     Row Name 20 1315          PT Short Term Goals    STG 1  Pt to come to stand from armless chair to rolling walker with both hands on the chair seat.   -LB     STG 1 Progress  Met  -LB     STG 2  Pt to increase strength in her bilateral hip abductors to 3+/5.  -LB     STG 2 Progress  Met  -LB     STG 3  Pt to increase strength ankle musculature by 1/2 grade overall.   -LB     STG 3 Progress   Met  -LB     STG 4  Pt to ascend and descend a curb with rolling walker with CGA to minimal of 1.   -LB     STG 4 Progress  Met  -LB     STG 5  Pt to ambulate with ~ 80' x 3 in one session with rolling walker.   -LB     STG 5 Progress  Met  -LB     STG 6  Pt to improve score to 18/28 on the [place] to improve balance and reduce risk of falls.   -LB     STG 6 Progress  Met  -LB     STG 6 Progress Comments  Pt scored a 20/28.  -LB     STG 7  Pt to be evaluated on stairs.   -LB     STG 7 Progress  Met  -LB     STG 8  Pt to perform 3 steps with one rail with moderate of PT.  -LB     STG 8 Progress  Met  -LB     STG 9  Pt to perform transfers to and from the rollator seat safely and independently.  -LB     STG 9 Progress  Partially Met  -LB     STG 9 Progress Comments  Pt requires CGA and verbal cues.  -LB     STG 10  Pt to ascend and descend a ramp with the C-Lever rollator with SBA.   -LB     STG 10 Progress  Not Met  -LB     STG 10 Progress Comments  CGA required   -LB        Long Term Goals    LTG 1  Pt to increase strength in her bilateral hip abductors to 4/5.  -LB     LTG 1 Progress  Partially Met  -LB     LTG 2  Pt to increase strength ankle musculature by 1 grade overall  -LB     LTG 2 Progress  Met  -LB     LTG 3  Pt to improve score to 21/28 on the [place] to improve balance and reduce risk of falls.   -LB     LTG 3 Progress  Met  -LB     LTG 4  Pt to ambulate ~ 200' x 3 conditional independent with least restrictive device  -LB     LTG 4 Progress  Partially Met  -LB     LTG 5  Pt to perform knee flexion in sitting with maximal resistance.   -LB     LTG 5 Progress  Met  -LB     LTG 6  Pt to ambulate with increased wt shift forward and increased pushoff   -LB     LTG 6 Progress  Partially Met  -LB     LTG 7  Pt to ascend and descend a curb conditional independent with least restrictive device.   -LB     LTG 7 Progress  Not Met  -LB     LTG 7 Progress Comments  CGA required   -LB     LTG 8  Pt to be independent  with HEP with emphasis on LE strengthening.   -LB     LTG 8 Progress  Met  -LB     LTG 9  Pt to complete the TUG in < 40 seconds while ambulating with SBQC.   -LB     LTG 9 Progress  Not Met  -LB     LTG 10  Pt to ascend and descend 3 steps with a rail and SBQC with CGA of her .   -LB     LTG 10 Progress  Met  -LB       User Key  (r) = Recorded By, (t) = Taken By, (c) = Cosigned By    Initials Name Provider Type    Nalini Becker, PT Physical Therapist                                         Time Calculation:                   OP PT Discharge Summary  Date of Discharge: 07/02/20  Reason for Discharge: Maximum functional potential achieved, other (comment)(Pt called and stated she would not be returning due to the COVID pandemic. )  Outcomes Achieved: Patient able to partially acheive established goals  Discharge Destination: Home with home program        Nalini Mccall, PT  7/3/2020

## 2020-07-07 RX ORDER — APIXABAN 5 MG/1
TABLET, FILM COATED ORAL
Qty: 180 TABLET | Refills: 2 | Status: SHIPPED | OUTPATIENT
Start: 2020-07-07 | End: 2021-03-23

## 2020-07-07 RX ORDER — POTASSIUM CHLORIDE 750 MG/1
10 TABLET, FILM COATED, EXTENDED RELEASE ORAL DAILY
Qty: 90 TABLET | Refills: 2 | Status: SHIPPED | OUTPATIENT
Start: 2020-07-07 | End: 2021-03-23

## 2020-11-10 ENCOUNTER — APPOINTMENT (OUTPATIENT)
Dept: WOMENS IMAGING | Facility: HOSPITAL | Age: 70
End: 2020-11-10

## 2020-11-10 PROCEDURE — 77063 BREAST TOMOSYNTHESIS BI: CPT | Performed by: RADIOLOGY

## 2020-11-10 PROCEDURE — 77067 SCR MAMMO BI INCL CAD: CPT | Performed by: RADIOLOGY

## 2020-12-22 NOTE — ADDENDUM NOTE
Addended by: REINALDO GARCIA on: 11/25/2019 04:09 PM     Modules accepted: Level of Service     Duration Of Freeze Thaw-Cycle (Seconds): 0 Consent: The patient's consent was obtained including but not limited to risks of crusting, scabbing, blistering, scarring, darker or lighter pigmentary change, recurrence, incomplete removal and infection. Render Post-Care Instructions In Note?: no Post-Care Instructions: I reviewed with the patient in detail post-care instructions. Patient is to wear sunprotection, and avoid picking at any of the treated lesions. Pt may apply Vaseline to crusted or scabbing areas. Detail Level: Simple

## 2021-01-05 ENCOUNTER — TELEMEDICINE (OUTPATIENT)
Dept: CARDIOLOGY | Facility: CLINIC | Age: 71
End: 2021-01-05

## 2021-01-05 VITALS
BODY MASS INDEX: 37.32 KG/M2 | RESPIRATION RATE: 16 BRPM | HEIGHT: 65 IN | OXYGEN SATURATION: 98 % | HEART RATE: 65 BPM | DIASTOLIC BLOOD PRESSURE: 85 MMHG | SYSTOLIC BLOOD PRESSURE: 151 MMHG | WEIGHT: 224 LBS

## 2021-01-05 DIAGNOSIS — I48.11 LONGSTANDING PERSISTENT ATRIAL FIBRILLATION (HCC): Primary | ICD-10-CM

## 2021-01-05 DIAGNOSIS — I10 ESSENTIAL HYPERTENSION: ICD-10-CM

## 2021-01-05 PROBLEM — I48.19 PERSISTENT ATRIAL FIBRILLATION (HCC): Status: ACTIVE | Noted: 2019-08-13

## 2021-01-05 PROCEDURE — 99214 OFFICE O/P EST MOD 30 MIN: CPT | Performed by: NURSE PRACTITIONER

## 2021-01-05 RX ORDER — HYDRALAZINE HYDROCHLORIDE 50 MG/1
50 TABLET, FILM COATED ORAL 2 TIMES DAILY
Qty: 60 TABLET | Refills: 11 | Status: SHIPPED | OUTPATIENT
Start: 2021-01-05 | End: 2023-01-27

## 2021-01-05 RX ORDER — HYDRALAZINE HYDROCHLORIDE 25 MG/1
25 TABLET, FILM COATED ORAL 2 TIMES DAILY
COMMUNITY
Start: 2020-12-16 | End: 2021-01-05 | Stop reason: SDUPTHER

## 2021-01-05 NOTE — PROGRESS NOTES
Date of Office Visit: 2021  Encounter Provider: CAROLA Alex  Place of Service: [facility] CARDIOLOGY  Patient Name: Baltazar MELO  :1950    Chief Complaint   Patient presents with   • Atrial Fibrillation     FOLLOW UP   • Hypertension   :     HPI: Baltazar MELO is a 70 y.o. female who presents today for follow-up.  Old records have been obtained and reviewed by me.  She is a patient of Dr. Farah's with a past cardiac history significant for hypertension and atrial fibrillation.  Echocardiogram in 2019 revealed normal LV systolic function, mild to moderate mitral regurgitation, and no other significant abnormalities.  She has been rate controlled and anticoagulated.  In May 2020, she had a telehealth call with Dr. Farah at which time she was doing well with no complaints of angina or heart failure.  Her carvedilol was increased to 12.5 mg twice daily and she was advised to follow-up in 6 months.  She has agreed to a telehealth visit for follow up.     Overall she has been doing well.  She has been putting a lot of effort into lifestyle changes.  She is only eating veggies, fruit, and fish.  She was started on hydralazine in December for her blood pressure.  She reports that it is better but still high.  Since starting hydralazine, she notices occasional dizziness and lightheadedness.  She denies any chest pain, shortness of breath, palpitations, edema, or syncope.  She denies any bleeding difficulties or melena.    Past Medical History:   Diagnosis Date   • A-fib (CMS/HCC)    • Arthritis    • Creatinine elevation    • Diabetes mellitus (CMS/HCC)    • Elevated cholesterol    • Health care maintenance    • Hyperlipidemia    • Hypertension    • Obesity    • PVC (premature ventricular contraction)        Past Surgical History:   Procedure Laterality Date   • BACK SURGERY     • BREAST CYST EXCISION Left    • BREAST SURGERY Bilateral     Reduction   •  CHOLECYSTECTOMY     • HYSTERECTOMY     • LUMBAR DISCECTOMY Left 2019    Procedure: Left Lumbar Three to Lumbar Four and Lumbar Four to Lumbar Five Decompression;  Surgeon: Abhijit Robertson MD;  Location: [facility];  Service: Neurosurgery       Social History     Socioeconomic History   • Marital status:      Spouse name: Not on file   • Number of children: Not on file   • Years of education: Not on file   • Highest education level: Not on file   Occupational History   • Occupation: retired   Tobacco Use   • Smoking status: Former Smoker     Packs/day: 0.25     Years: 0.50     Pack years: 0.12     Types: Cigarettes     Quit date: 1972     Years since quittin.0   • Smokeless tobacco: Never Used   • Tobacco comment: NO CAFFEINE USE   Substance and Sexual Activity   • Alcohol use: No   • Drug use: No   • Sexual activity: Defer       Family History   Problem Relation Age of Onset   • Heart disease Father    • Cancer Mother    • No Known Problems Maternal Grandmother    • No Known Problems Maternal Grandfather    • No Known Problems Paternal Grandmother    • No Known Problems Paternal Grandfather        Review of Systems   Cardiovascular: Negative for chest pain, dyspnea on exertion, orthopnea, palpitations and syncope.   Hematologic/Lymphatic: Negative for bleeding problem.   Gastrointestinal: Negative for melena.   Neurological: Positive for dizziness and light-headedness.       Allergies   Allergen Reactions   • Adhesive Tape Dermatitis and Other (See Comments)     Blisters   • Cefaclor Dizziness   • Ibuprofen Other (See Comments)     Kidney dysfunction    • Penicillins Dizziness   • Levofloxacin Other (See Comments)   • Ascorbic Acid & Derivatives Other (See Comments)     Epistaxis   • Latex Rash         Current Outpatient Medications:   •  ACCU-CHEK LEE PLUS test strip, USE 1 STRIP D AS INSTRUCTED, Disp: , Rfl: 5  •  acetaminophen (TYLENOL) 325 MG tablet, Take 975 mg by mouth., Disp: ,  Rfl:   •  aspirin 81 MG tablet, Take 1 tablet by mouth daily., Disp: , Rfl:   •  atorvastatin (LIPITOR) 10 MG tablet, Take 1 tablet by mouth daily., Disp: , Rfl:   •  B-D UF III MINI PEN NEEDLES 31G X 5 MM misc, INJECTING QID, Disp: , Rfl: 0  •  BIOTIN PO, Take 1 tablet by mouth Daily., Disp: , Rfl:   •  carvedilol (COREG) 12.5 MG tablet, Take 1 tablet by mouth 2 (Two) Times a Day., Disp: 180 tablet, Rfl: 3  •  chlorthalidone (HYGROTON) 25 MG tablet, TAKE 1 TABLET BY MOUTH EVERY MORNING, Disp: 90 tablet, Rfl: 3  •  Cholecalciferol (Vitamin D3) 125 MCG (5000 UT) tablet dispersible, Take 5,000 Units by mouth Daily., Disp: , Rfl:   •  ELIQUIS 5 MG tablet tablet, TAKE 1 TABLET BY MOUTH EVERY 12 HOURS, Disp: 180 tablet, Rfl: 2  •  Ferrous Fumarate (IRON) 18 MG tablet controlled-release, Take  by mouth., Disp: , Rfl:   •  Flaxseed, Linseed, (FLAX SEEDS) powder, Take  by mouth., Disp: , Rfl:   •  fluticasone (FLONASE) 50 MCG/ACT nasal spray, 1 spray into each nostril Daily., Disp: , Rfl:   •  Folic Acid 5 MG capsule, Take  by mouth Every Other Day., Disp: , Rfl:   •  glucose blood test strip, 1 strip by Other route Daily., Disp: , Rfl:   •  hydrALAZINE (APRESOLINE) 50 MG tablet, Take 1 tablet by mouth 2 (two) times a day., Disp: 60 tablet, Rfl: 11  •  Lactobacillus Rhamnosus, GG, (PROBIOTIC COLIC) liquid, Take  by mouth., Disp: , Rfl:   •  Multiple Vitamins-Minerals (MULTIVITAMIN PO), Take 1 tablet by mouth daily., Disp: , Rfl:   •  ONETOUCH DELICA LANCETS 33G misc, USE AS DIRECTED TO CHECK BLOOD SUGAR ONCE DAILY, Disp: , Rfl:   •  pioglitazone (ACTOS) 15 MG tablet, TAKE 1 TABLET BY MOUTH EVERY DAY, Disp: , Rfl:   •  polyethylene glycol (MIRALAX) pack packet, Take 17 g by mouth Daily., Disp: , Rfl:   •  potassium chloride (K-DUR) 10 MEQ CR tablet, TAKE 1 TABLET BY MOUTH DAILY, Disp: 90 tablet, Rfl: 2  •  sennosides-docusate sodium (SENOKOT-S) 8.6-50 MG tablet, Take 2 tablets by mouth Every Night., Disp: , Rfl:   •  vitamin  "B-12 (CYANOCOBALAMIN) 1000 MCG tablet, Take 1,000 mcg by mouth Daily., Disp: , Rfl:   •  clindamycin (CLEOCIN) 300 MG capsule, TAKE 2 CAPSULES BY MOUTH 1 HOUR BEFORE DENTAL PROCEDURE, Disp: , Rfl:   •  HYDROcodone-acetaminophen (NORCO) 5-325 MG per tablet, Take 1 tablet by mouth Every 4 (Four) Hours As Needed for Moderate Pain ., Disp: , Rfl:       Objective:     Vitals:    01/05/21 1340   BP: 151/85   Pulse: 65   Resp: 16   SpO2: 98%   Weight: 102 kg (224 lb)   Height: 165.1 cm (65\")     Body mass index is 37.28 kg/m².    PHYSICAL EXAM:    Pulmonary:      Effort: Pulmonary effort is normal.   Neurological:      Mental Status: Alert.   Psychiatric:         Attention and Perception: Attention normal.         Mood and Affect: Mood normal.         Behavior: Behavior is cooperative.           Assessment:       Diagnosis Plan   1. Longstanding persistent atrial fibrillation (CMS/HCC)     2. Essential hypertension       No orders of the defined types were placed in this encounter.         Plan:       1.  Atrial fibrillation.  She is anticoagulated on Eliquis and rate controlled on carvedilol.      2.  Hypertension.  Her blood pressure is still not at goal.  She is on carvedilol 12.5 mg twice daily, chlorthalidone 25 mg daily, and hydralazine 25 mg twice daily.  I'm going to increase the hydralazine to 50 mg twice daily.  We discussed the blood pressure goal of less than 140/90.  She will call me if the hydralazine adjustment does not get her to goal.      Overall think she stable and doing well.  She denies any symptoms of angina or heart failure.  I told her to let me know should the dizziness increase in frequency or intensity.  Otherwise, she will follow-up with Dr. Farah in 1 year or sooner if needed.      This patient has consented to a telehealth visit via video. The visit was scheduled as a video visit to comply with patient safety concerns in accordance with CDC recommendations.  All vitals recorded within this " visit are reported by the patient.  I spent 20 minutes in total including but not limited to the 11 minutes spent in direct conversation with this patient.       As always, it has been a pleasure to participate in your patient's care.      Sincerely,         CAROLA Echeverria

## 2021-02-19 ENCOUNTER — APPOINTMENT (OUTPATIENT)
Dept: WOMENS IMAGING | Facility: HOSPITAL | Age: 71
End: 2021-02-19

## 2021-02-19 PROCEDURE — G0279 TOMOSYNTHESIS, MAMMO: HCPCS | Performed by: RADIOLOGY

## 2021-02-19 PROCEDURE — 76642 ULTRASOUND BREAST LIMITED: CPT | Performed by: RADIOLOGY

## 2021-02-19 PROCEDURE — 77065 DX MAMMO INCL CAD UNI: CPT | Performed by: RADIOLOGY

## 2021-02-24 ENCOUNTER — TELEPHONE (OUTPATIENT)
Dept: CARDIOLOGY | Facility: CLINIC | Age: 71
End: 2021-02-24

## 2021-02-24 NOTE — TELEPHONE ENCOUNTER
Called and spoke with patient. She stated that she did not take her Eliquis today. She verbalized understanding to not take her Eliquis until it is safe from a bleeding standpoint.

## 2021-02-24 NOTE — TELEPHONE ENCOUNTER
Do not take anymore Eliquis she is going to have to check with and see if it is okay to do the biopsy still and they can start the Eliquis once it safe from a bleeding standpoint

## 2021-02-24 NOTE — TELEPHONE ENCOUNTER
Patient called today stating that she is having a procedure done tomorrow. She will be having a ultrasound vacuum breast biopsy.     They have already told her to stop taking the aspirin. But they was unsure on what do with the Eliquis. She has not taken the Eliquis this morning.     She would like to know how should she take the Eliquis? When should she stop and restart it?    She can be reached at [phone]    Thanks

## 2021-02-25 ENCOUNTER — APPOINTMENT (OUTPATIENT)
Dept: WOMENS IMAGING | Facility: HOSPITAL | Age: 71
End: 2021-02-25

## 2021-02-25 PROCEDURE — 88305 TISSUE EXAM BY PATHOLOGIST: CPT

## 2021-02-25 PROCEDURE — 88361 TUMOR IMMUNOHISTOCHEM/COMPUT: CPT

## 2021-02-25 PROCEDURE — 19083 BX BREAST 1ST LESION US IMAG: CPT | Performed by: RADIOLOGY

## 2021-03-22 ENCOUNTER — TELEPHONE (OUTPATIENT)
Dept: CARDIOLOGY | Facility: CLINIC | Age: 71
End: 2021-03-22

## 2021-03-22 NOTE — TELEPHONE ENCOUNTER
Patient needing to have another BX on her right breast  She is having this done on 4/5/21  Needs to know when to stop her eliquis and aspirin having done at [facility]     [phone]

## 2021-03-23 RX ORDER — APIXABAN 5 MG/1
TABLET, FILM COATED ORAL
Qty: 180 TABLET | Refills: 2 | Status: SHIPPED | OUTPATIENT
Start: 2021-03-23 | End: 2021-09-14

## 2021-03-23 RX ORDER — POTASSIUM CHLORIDE 750 MG/1
10 TABLET, FILM COATED, EXTENDED RELEASE ORAL DAILY
Qty: 90 TABLET | Refills: 2 | Status: SHIPPED | OUTPATIENT
Start: 2021-03-23 | End: 2021-12-15

## 2021-06-14 RX ORDER — CARVEDILOL 12.5 MG/1
TABLET ORAL
Qty: 180 TABLET | Refills: 3 | Status: SHIPPED | OUTPATIENT
Start: 2021-06-14

## 2021-09-14 RX ORDER — APIXABAN 5 MG/1
TABLET, FILM COATED ORAL
Qty: 180 TABLET | Refills: 2 | Status: SHIPPED | OUTPATIENT
Start: 2021-09-14 | End: 2023-01-27 | Stop reason: ALTCHOICE

## 2021-12-15 RX ORDER — POTASSIUM CHLORIDE 750 MG/1
10 TABLET, FILM COATED, EXTENDED RELEASE ORAL DAILY
Qty: 90 TABLET | Refills: 2 | Status: SHIPPED | OUTPATIENT
Start: 2021-12-15 | End: 2022-09-06

## 2022-01-26 ENCOUNTER — OFFICE VISIT (OUTPATIENT)
Dept: CARDIOLOGY | Facility: CLINIC | Age: 72
End: 2022-01-26

## 2022-01-26 VITALS
WEIGHT: 210 LBS | BODY MASS INDEX: 34.99 KG/M2 | DIASTOLIC BLOOD PRESSURE: 74 MMHG | HEIGHT: 65 IN | SYSTOLIC BLOOD PRESSURE: 152 MMHG | HEART RATE: 77 BPM

## 2022-01-26 DIAGNOSIS — I10 PRIMARY HYPERTENSION: ICD-10-CM

## 2022-01-26 DIAGNOSIS — E11.59 TYPE 2 DIABETES MELLITUS WITH OTHER CIRCULATORY COMPLICATION, WITHOUT LONG-TERM CURRENT USE OF INSULIN: ICD-10-CM

## 2022-01-26 DIAGNOSIS — I48.19 PERSISTENT ATRIAL FIBRILLATION: Primary | ICD-10-CM

## 2022-01-26 PROCEDURE — 99214 OFFICE O/P EST MOD 30 MIN: CPT | Performed by: INTERNAL MEDICINE

## 2022-01-26 PROCEDURE — 93000 ELECTROCARDIOGRAM COMPLETE: CPT | Performed by: INTERNAL MEDICINE

## 2022-01-26 RX ORDER — TRAMADOL HYDROCHLORIDE 50 MG/1
50 TABLET ORAL EVERY 6 HOURS PRN
COMMUNITY

## 2022-01-26 NOTE — PROGRESS NOTES
Date of Office Visit: 22  Encounter Provider: Harjit Farah MD  Place of Service: [facility] CARDIOLOGY  Patient Name: Baltazar MELO  :1950  [id number]    Chief Complaint   Patient presents with   • Atrial Fibrillation   :     HPI: Baltazar MELO is a 71 y.o. female she has had a history of difficult to treat hypertension, paroxysmal A. fib and obesity.  She is here for follow-up.  Echocardiogram in 2019 had normal LV function mild to moderate MR normal pulmonary pressures.  She had breast cancer stage I last year it is dominated her life since then she recently has changed her blood pressure medicine she was just taking it twice a day now she is taking it 3 times a day which is great she is not having any chest pain she is not having any shortness of breath no PND orthopnea edema she is on long-acting chemotherapy that makes her nauseated that is why she is lost weight.  She has not been vaccinated    Past Medical History:   Diagnosis Date   • A-fib (HCC)    • Arthritis    • Cancer (HCC)    • Creatinine elevation    • Diabetes mellitus (HCC)    • Elevated cholesterol    • Health care maintenance    • Hyperlipidemia    • Hypertension    • Obesity    • PVC (premature ventricular contraction)        Past Surgical History:   Procedure Laterality Date   • BACK SURGERY     • BREAST CYST EXCISION Left    • BREAST LUMPECTOMY Right    • BREAST SURGERY Bilateral     Reduction   • CHOLECYSTECTOMY     • HYSTERECTOMY     • LUMBAR DISCECTOMY Left 2019    Procedure: Left Lumbar Three to Lumbar Four and Lumbar Four to Lumbar Five Decompression;  Surgeon: Abhijit Robertson MD;  Location: [facility];  Service: Neurosurgery       Social History     Socioeconomic History   • Marital status:    Tobacco Use   • Smoking status: Former Smoker     Packs/day: 0.25     Years: 0.50     Pack years: 0.12     Types: Cigarettes     Quit date: 1972     Years since quittin.1    • Smokeless tobacco: Never Used   • Tobacco comment: NO CAFFEINE USE   Substance and Sexual Activity   • Alcohol use: No   • Drug use: No   • Sexual activity: Defer       Family History   Problem Relation Age of Onset   • Heart disease Father    • Cancer Mother    • No Known Problems Maternal Grandmother    • No Known Problems Maternal Grandfather    • No Known Problems Paternal Grandmother    • No Known Problems Paternal Grandfather        Review of Systems   Constitutional: Negative for decreased appetite, fever, malaise/fatigue and weight loss.   HENT: Negative for nosebleeds.    Eyes: Negative for double vision.   Cardiovascular: Negative for chest pain, claudication, cyanosis, dyspnea on exertion, irregular heartbeat, leg swelling, near-syncope, orthopnea, palpitations, paroxysmal nocturnal dyspnea and syncope.   Respiratory: Negative for cough, hemoptysis and shortness of breath.    Hematologic/Lymphatic: Negative for bleeding problem.   Skin: Negative for rash.   Musculoskeletal: Negative for falls and myalgias.   Gastrointestinal: Negative for hematochezia, jaundice, melena, nausea and vomiting.   Genitourinary: Negative for hematuria.   Neurological: Negative for dizziness and seizures.   Psychiatric/Behavioral: Negative for altered mental status and memory loss.       Allergies   Allergen Reactions   • Adhesive Tape Dermatitis and Other (See Comments)     Blisters   • Cefaclor Dizziness   • Ibuprofen Other (See Comments)     Kidney dysfunction    • Penicillins Dizziness   • Levofloxacin Other (See Comments)   • Ascorbic Acid & Derivatives Other (See Comments)     Epistaxis   • Latex Rash         Current Outpatient Medications:   •  ACCU-CHEK LEE PLUS test strip, USE 1 STRIP D AS INSTRUCTED, Disp: , Rfl: 5  •  acetaminophen (TYLENOL) 325 MG tablet, Take 975 mg by mouth., Disp: , Rfl:   •  aspirin 81 MG tablet, Take 1 tablet by mouth daily., Disp: , Rfl:   •  atorvastatin (LIPITOR) 10 MG tablet, Take 1  tablet by mouth daily., Disp: , Rfl:   •  B-D UF III MINI PEN NEEDLES 31G X 5 MM misc, INJECTING QID, Disp: , Rfl: 0  •  BIOTIN PO, Take 1 tablet by mouth Daily., Disp: , Rfl:   •  carvedilol (COREG) 12.5 MG tablet, TAKE 1 TABLET BY MOUTH TWICE DAILY, Disp: 180 tablet, Rfl: 3  •  Cholecalciferol (Vitamin D3) 125 MCG (5000 UT) tablet dispersible, Take 5,000 Units by mouth Daily., Disp: , Rfl:   •  clindamycin (CLEOCIN) 300 MG capsule, TAKE 2 CAPSULES BY MOUTH 1 HOUR BEFORE DENTAL PROCEDURE, Disp: , Rfl:   •  Eliquis 5 MG tablet tablet, TAKE 1 TABLET BY MOUTH EVERY 12 HOURS, Disp: 180 tablet, Rfl: 2  •  Ferrous Fumarate (IRON) 18 MG tablet controlled-release, Take  by mouth., Disp: , Rfl:   •  fluticasone (FLONASE) 50 MCG/ACT nasal spray, 1 spray into each nostril Daily., Disp: , Rfl:   •  Folic Acid 5 MG capsule, Take  by mouth Every Other Day., Disp: , Rfl:   •  glucose blood test strip, 1 strip by Other route Daily., Disp: , Rfl:   •  hydrALAZINE (APRESOLINE) 50 MG tablet, Take 1 tablet by mouth 2 (two) times a day. (Patient taking differently: Take 50 mg by mouth 3 (Three) Times a Day.), Disp: 60 tablet, Rfl: 11  •  Lactobacillus Rhamnosus, GG, (PROBIOTIC COLIC) liquid, Take  by mouth., Disp: , Rfl:   •  Multiple Vitamins-Minerals (MULTIVITAMIN PO), Take 1 tablet by mouth daily., Disp: , Rfl:   •  ONETOUCH DELICA LANCETS 33G misc, USE AS DIRECTED TO CHECK BLOOD SUGAR ONCE DAILY, Disp: , Rfl:   •  pioglitazone (ACTOS) 15 MG tablet, TAKE 1 TABLET BY MOUTH EVERY DAY, Disp: , Rfl:   •  polyethylene glycol (MIRALAX) pack packet, Take 17 g by mouth Daily., Disp: , Rfl:   •  potassium chloride 10 MEQ CR tablet, TAKE 1 TABLET BY MOUTH DAILY, Disp: 90 tablet, Rfl: 2  •  sennosides-docusate sodium (SENOKOT-S) 8.6-50 MG tablet, Take 2 tablets by mouth Every Night., Disp: , Rfl:   •  traMADol (ULTRAM) 50 MG tablet, Take 50 mg by mouth Every 6 (Six) Hours As Needed for Moderate Pain ., Disp: , Rfl:   •  vitamin B-12  "(CYANOCOBALAMIN) 1000 MCG tablet, Take 1,000 mcg by mouth Daily., Disp: , Rfl:   •  chlorthalidone (HYGROTON) 25 MG tablet, TAKE 1 TABLET BY MOUTH EVERY MORNING, Disp: 90 tablet, Rfl: 3  •  Flaxseed, Linseed, (FLAX SEEDS) powder, Take  by mouth., Disp: , Rfl:   •  HYDROcodone-acetaminophen (NORCO) 5-325 MG per tablet, Take 1 tablet by mouth Every 4 (Four) Hours As Needed for Moderate Pain ., Disp: , Rfl:       Objective:     Vitals:    01/26/22 0830   BP: 152/74   Pulse: 77   Weight: 95.3 kg (210 lb)   Height: 165.1 cm (65\")     Body mass index is 34.95 kg/m².    Constitutional:       Appearance: Well-developed.   Eyes:      General: No scleral icterus.  HENT:      Head: Normocephalic.   Neck:      Thyroid: No thyromegaly.      Vascular: No JVD.      Lymphadenopathy: No cervical adenopathy.   Pulmonary:      Effort: Pulmonary effort is normal.      Breath sounds: Normal breath sounds. No wheezing. No rales.   Cardiovascular:      Normal rate. Irregularly irregular rhythm.      No gallop.   Edema:     Peripheral edema absent.   Abdominal:      Palpations: Abdomen is soft.      Tenderness: There is no abdominal tenderness.   Musculoskeletal: Normal range of motion. Skin:     General: Skin is warm and dry.      Findings: No rash.   Neurological:      Mental Status: Alert and oriented to person, place, and time.           ECG 12 Lead    Date/Time: 1/26/2022 9:20 AM  Performed by: Harjit Farah MD  Authorized by: Harjit Farah MD   Comparison: compared with previous ECG   Similar to previous ECG  Rhythm: atrial fibrillation  Other findings: non-specific ST-T wave changes    Clinical impression: abnormal EKG             Assessment:       Diagnosis Plan   1. Persistent atrial fibrillation (CMS/HCC)     2. Primary hypertension     3. Type 2 diabetes mellitus with other circulatory complication, without long-term current use of insulin (HCC)            Plan:       Well from a cardiac standpoint she is getting along " okay her A. fib rate is controlled I did recommend she stop her aspirin she is anticoagulated.  I of course would like her to do a little bit more activity but she just doesn't feel very good she is going to watch her blood pressure if it stays up she'll let us know I probably would increase her carvedilol to 25 twice a day if her blood pressure stays up I talked with her about the vaccine and I recommend that she get that have her come back and see us in a year    Return for 1 year with Gely Valdes or Yamila Conner, 2 years with me.     As always, it has been a pleasure to participate in your patient's care.      Sincerely,       Harjit Farah MD

## 2022-02-15 ENCOUNTER — TELEPHONE (OUTPATIENT)
Dept: CARDIOLOGY | Facility: CLINIC | Age: 72
End: 2022-02-15

## 2022-02-15 NOTE — TELEPHONE ENCOUNTER
Patient calling states her doctor told her to call you her Hemoglobin is 7.9. they are increasing her Iron to 18mg 1 TID  She is on Eliquis 5mg 1 po bid    [phone]

## 2022-02-28 ENCOUNTER — APPOINTMENT (OUTPATIENT)
Dept: WOMENS IMAGING | Facility: HOSPITAL | Age: 72
End: 2022-02-28

## 2022-02-28 PROCEDURE — 77067 SCR MAMMO BI INCL CAD: CPT | Performed by: RADIOLOGY

## 2022-02-28 PROCEDURE — 77063 BREAST TOMOSYNTHESIS BI: CPT | Performed by: RADIOLOGY

## 2022-07-22 NOTE — THERAPY TREATMENT NOTE
"    Outpatient Physical Therapy Neuro Treatment Note  [facility]     Patient Name: Baltazar MELO  : 1950  MRN: [id number]  Today's Date: 3/2/2020      Visit Date: 2020    Visit Dx:    ICD-10-CM ICD-9-CM   1. Muscle weakness (generalized) M62.81 728.87   2. Functional gait abnormality R26.89 781.2   3. History of lumbar laminectomy for spinal cord decompression Z98.890 V45.89   4. Stenosis of lateral recess of lumbar spine M48.061 724.02       Patient Active Problem List   Diagnosis   • Hypertension   • Morbid obesity with BMI of 40.0-44.9, adult (CMS/HCC)   • Lumbar back pain with radiculopathy affecting right lower extremity   • Diabetes mellitus (CMS/HCC)   • Renal lesion   • Paroxysmal atrial fibrillation (CMS/HCC)   • Post-operative state   • Weakness of both lower extremities           PT Neuro     Row Name 20 1101             Subjective Comments    Subjective Comments  \"I went up the steps into the kitchen this weekend. My  helped me. I had to stop and rest. Next time we will go in the front door.\" \"I had no trouble going down the steps.\"   -LB         Precautions and Contraindications    Precautions  Latex Allergy , spinal surgery 19 GREAT difficulty with the CURB on IE   -LB         Subjective Pain    Able to rate subjective pain?  yes  -LB      Pre-Treatment Pain Level  0  -LB      Post-Treatment Pain Level  0  -LB         Home Living    Living Environment Comment  first step 3 3/4\" and 2nd step 7 /\" into pt's front door ,, From one level to kitchen level five 8\" steps with one rail    -LB         Cognition    Overall Cognitive Status  WFL  -LB      Memory  Appears intact  -LB      Orientation Level  Oriented X4  -LB      Safety Judgment  Good awareness of safety precautions  -LB      Deficits  Fully aware of deficits  -LB         Posture/Observations    Alignment Options  Forward head  -LB      Forward Head  Mild  -LB      Observations  Edema mild edema in both legs   " "-LB      Posture/Observations Comments  Pt arrived to PT ambulating with her rollator.   -LB         Transfers    Sit-Stand [place] (Transfers)  conditional independence  -LB      Stand-Sit [place] (Transfers)  conditional independence  -LB      Transfers, Sit-Stand-Sit, Assist Device  -- rollator   -LB      Transfer, Safety Issues  other (see comments) definite need of UE's with to stand   -LB         Gait/Stairs Assessment/Training    [place] Level (Gait)  supervision  -LB      Assistive Device (Gait)  walker, 4-wheeled with AFO   -LB      Distance in Feet (Gait)  100'   -LB      Pattern (Gait)  step-through  -LB      Bilateral Gait Deviations  forward flexed posture decreased pushoff   -LB      [place] Level (Stairs)  contact guard;verbal cues;minimum assist (75% patient effort) CGA to minimal ascending, CGA descending  -LB      Assistive Device (Stairs)  cane, quad SBQC on R with ascending, L on descending  -LB      Handrail Location (Stairs)  left side (ascending)  -LB      Number of Steps (Stairs)  4  -LB      Ascending Technique (Stairs)  step-to-step  -LB      Descending Technique (Stairs)  step-to-step  -LB      Stairs, Safety Issues  weight-shifting ability decreased  -LB      Stairs, Impairments  strength decreased;impaired balance  -LB      [place] Level (Ramp)  contact guard  -LB      Assistive Device (Ramp)  walker, 4-wheeled  -LB      Comment (Gait/Stairs)  Pt ascended a 8\" step with her rollator CGA of PT. Pt descended an 8\" curb with a rolling walker with CGA of PT and  verbal cues required from PT.  PT placed the rollator up and down the step.   -LB         Balance Skills Training    Gait Balance-Level of Assistance  Contact guard  -LB      Gait Balance Support  Right upper extremity supported;Left upper extremity supported;miguel angel bar  -LB      Gait Balance Activities  backwards;side-stepping  -LB         Orthotic/Prosthetic Management    Orthosis Location  AFO (ankle foot " "orthosis)  -LB         Ankle Foot Orthosis Management    Wearing Schedule (Ankle Foot Orthosis)  wear with activity/work  -LB      Orthosis Training (Ankle Foot Orthosis)  patient and caregiver  -LB      Compliance/Wearing Issues (Ankle Foot Orthosis)  patient/caregiver comprehend strategies  -LB        User Key  (r) = Recorded By, (t) = Taken By, (c) = Cosigned By    Initials Name Provider Type    Nalini Becker, PT Physical Therapist                  PT Assessment/Plan     Row Name 03/02/20 1621          PT Assessment    Assessment Comments  Pt reporting that she was able to climb the steps into her kitchen with her 's assistance. Pt was very pleased she was able to accomplish this task. Pt did report that she will go into the front door next time. Pt was evaluated with steps with one rail and a SBQC.   -LB       User Key  (r) = Recorded By, (t) = Taken By, (c) = Cosigned By    Initials Name Provider Type    Nalini Becker PT Physical Therapist             OP Exercises     Row Name 03/02/20 1101             Subjective Comments    Subjective Comments  \"I went up the steps into the kitchen this weekend. My  helped me. I had to stop and rest. Next time we will go in the front door.\" \"I had no trouble going down the steps.\"   -LB         Subjective Pain    Able to rate subjective pain?  yes  -LB      Pre-Treatment Pain Level  0  -LB      Post-Treatment Pain Level  0  -LB         Exercise 2    Exercise Name 2  Step-ups onto 4\" step within miguel angel-bars with R UE on the bar and minimal L UE support  -LB      Cueing 2  Verbal;Tactile  -LB      Reps 2  10  -LB         Exercise 7    Exercise Name 7  knee flexion each LE in standing within ll bars   -LB      Cueing 7  Verbal;Tactile  -LB      Sets 7  2   -LB      Reps 7  5  -LB         Exercise 9    Exercise Name 9  Sidestepping with knees flexed ~ 10 degrees with strong CGA   -LB      Cueing 9  Verbal;Tactile  -LB      Reps 9  10' each LE   -LB        User " Key  (r) = Recorded By, (t) = Taken By, (c) = Cosigned By    Initials Name Provider Type    LB Nalini Mccall, PT Physical Therapist                          Therapy Education  Education Details: Stairs with one rail and SBQC.   Given: Mobility training  How Provided: Verbal  Provided to: Patient  Level of Understanding: Verbalized, Teach back education performed, Demonstrated              Time Calculation:   Start Time: 1101  Stop Time: 1146  Time Calculation (min): 45 min  Total Timed Code Minutes- PT: 44 minute(s)   Therapy Charges for Today     Code Description Service Date Service Provider Modifiers Qty    [id number]  PT NEUROMUSC RE EDUCATION EA 15 MIN 3/2/2020 Nalini Mccall, PT GP 3                    Nalini Mccall, PT  3/2/2020      Incidental Actinic Keratosis Histology Text: Incidental AK was noted at the periphery of the Mohs section destruction was performed, pt was was advised to monitor, and/or patient was advised to considered topical 5FU once fully healed.

## 2022-09-06 RX ORDER — POTASSIUM CHLORIDE 750 MG/1
10 TABLET, FILM COATED, EXTENDED RELEASE ORAL DAILY
Qty: 90 TABLET | Refills: 2 | Status: SHIPPED | OUTPATIENT
Start: 2022-09-06

## 2023-01-27 ENCOUNTER — OFFICE VISIT (OUTPATIENT)
Dept: CARDIOLOGY | Facility: CLINIC | Age: 73
End: 2023-01-27
Payer: MEDICARE

## 2023-01-27 VITALS
SYSTOLIC BLOOD PRESSURE: 146 MMHG | DIASTOLIC BLOOD PRESSURE: 74 MMHG | WEIGHT: 218 LBS | HEIGHT: 65 IN | HEART RATE: 68 BPM | BODY MASS INDEX: 36.32 KG/M2

## 2023-01-27 DIAGNOSIS — I10 PRIMARY HYPERTENSION: ICD-10-CM

## 2023-01-27 DIAGNOSIS — I48.19 PERSISTENT ATRIAL FIBRILLATION: Primary | ICD-10-CM

## 2023-01-27 DIAGNOSIS — I34.0 NONRHEUMATIC MITRAL VALVE REGURGITATION: ICD-10-CM

## 2023-01-27 PROCEDURE — 93000 ELECTROCARDIOGRAM COMPLETE: CPT | Performed by: NURSE PRACTITIONER

## 2023-01-27 PROCEDURE — 99214 OFFICE O/P EST MOD 30 MIN: CPT | Performed by: NURSE PRACTITIONER

## 2023-01-27 RX ORDER — ANASTROZOLE 1 MG/1
TABLET ORAL
COMMUNITY
Start: 2022-10-28

## 2023-01-27 RX ORDER — HYDRALAZINE HYDROCHLORIDE 50 MG/1
75 TABLET, FILM COATED ORAL 3 TIMES DAILY
Qty: 420 TABLET | Refills: 0 | Status: SHIPPED | OUTPATIENT
Start: 2023-01-27

## 2023-01-27 NOTE — PROGRESS NOTES
Date of Office Visit: 2023  Encounter Provider: CAROLA Duff  Place of Service: [facility] CARDIOLOGY  Patient Name: Baltazar MELO  :1950    Chief Complaint   Patient presents with   • Atrial Fibrillation   :     HPI: Baltazar MELO is a 72 y.o. female.  She is a patient of Dr. Farah's whom we follow for hypertension, hyperlipidemia, mitral regurgitation, and atrial fibrillation.   She was last seen in the office by Dr. Farah in 2022 at which time she was doing well.  Her blood pressure was mildly elevated for which Dr. Farah recommended continued monitoring.  Otherwise, she was advised to follow-up in 1 year.   In 2022, her Eliquis was discontinued due to anemia.     Overall she has been feeling well.  Since stopping the Eliquis and aspirin last year, her hemoglobin has stabilized.  She does remain off both medications.  She is scheduled to have repeat labs with her primary care doctor soon.  She denies any chest pain, shortness of breath, palpitations, edema, dizziness, or syncope.  Reportedly she underwent a colonoscopy which was unremarkable.  Reportedly her blood pressures are generally in the 140s to 150s depending on what she eats.  She remains in remission and continues the anastrozole.    Past Medical History:   Diagnosis Date   • A-fib (HCC)    • Arthritis    • Cancer (HCC)    • Creatinine elevation    • Diabetes mellitus (HCC)    • Elevated cholesterol    • Health care maintenance    • Hyperlipidemia    • Hypertension    • Obesity    • PVC (premature ventricular contraction)        Past Surgical History:   Procedure Laterality Date   • BACK SURGERY     • BREAST CYST EXCISION Left    • BREAST LUMPECTOMY Right    • BREAST SURGERY Bilateral     Reduction   • CHOLECYSTECTOMY     • HYSTERECTOMY     • LUMBAR DISCECTOMY Left 2019    Procedure: Left Lumbar Three to Lumbar Four and Lumbar Four to Lumbar Five Decompression;  Surgeon:  Abhijit Robertson MD;  Location: [facility] OR;  Service: Neurosurgery       Social History     Socioeconomic History   • Marital status:    Tobacco Use   • Smoking status: Former     Packs/day: 0.25     Years: 0.50     Pack years: 0.13     Types: Cigarettes     Quit date: 1972     Years since quittin.1   • Smokeless tobacco: Never   • Tobacco comments:     NO CAFFEINE USE   Substance and Sexual Activity   • Alcohol use: No   • Drug use: No   • Sexual activity: Defer       Family History   Problem Relation Age of Onset   • Heart disease Father    • Cancer Mother    • No Known Problems Maternal Grandmother    • No Known Problems Maternal Grandfather    • No Known Problems Paternal Grandmother    • No Known Problems Paternal Grandfather        Review of Systems   Constitutional: Negative.   Cardiovascular: Negative.  Negative for chest pain, dyspnea on exertion, leg swelling, orthopnea, paroxysmal nocturnal dyspnea and syncope.   Respiratory: Negative.    Hematologic/Lymphatic: Negative for bleeding problem.   Musculoskeletal: Negative for falls.   Gastrointestinal: Negative for melena.   Neurological: Negative for dizziness and light-headedness.       Allergies   Allergen Reactions   • Adhesive Tape Dermatitis and Other (See Comments)     Blisters   • Cefaclor Dizziness   • Ibuprofen Other (See Comments)     Kidney dysfunction    • Penicillins Dizziness   • Levofloxacin Other (See Comments)   • Ascorbic Acid & Derivatives Other (See Comments)     Epistaxis   • Latex Rash         Current Outpatient Medications:   •  ACCU-CHEK LEE PLUS test strip, USE 1 STRIP D AS INSTRUCTED, Disp: , Rfl: 5  •  acetaminophen (TYLENOL) 325 MG tablet, Take 975 mg by mouth., Disp: , Rfl:   •  anastrozole (ARIMIDEX) 1 MG tablet,  1 Tab, Oral, Daily, # 90 Tab, 2 Refill(s), Pharmacy: [id number], cm, 22 12:05:00 EDT, CLINICALHEIGHT, 99.6, kg, 22 12:05:00 EDT, CLINICALWEIGHT, 165.1, Disp: , Rfl:    •  atorvastatin (LIPITOR) 10 MG tablet, Take 1 tablet by mouth daily., Disp: , Rfl:   •  B-D UF III MINI PEN NEEDLES 31G X 5 MM misc, INJECTING QID, Disp: , Rfl: 0  •  BIOTIN PO, Take 1 tablet by mouth Daily., Disp: , Rfl:   •  carvedilol (COREG) 12.5 MG tablet, TAKE 1 TABLET BY MOUTH TWICE DAILY (Patient taking differently: 25 mg 2 (Two) Times a Day With Meals.), Disp: 180 tablet, Rfl: 3  •  Cholecalciferol (Vitamin D3) 125 MCG (5000 UT) tablet dispersible, Take 5,000 Units by mouth Daily., Disp: , Rfl:   •  clindamycin (CLEOCIN) 300 MG capsule, TAKE 2 CAPSULES BY MOUTH 1 HOUR BEFORE DENTAL PROCEDURE, Disp: , Rfl:   •  Ferrous Fumarate (IRON) 18 MG tablet controlled-release, Take  by mouth., Disp: , Rfl:   •  Flaxseed, Linseed, (FLAX SEEDS) powder, Take  by mouth., Disp: , Rfl:   •  fluticasone (FLONASE) 50 MCG/ACT nasal spray, 1 spray into each nostril Daily., Disp: , Rfl:   •  Folic Acid 5 MG capsule, Take  by mouth Every Other Day., Disp: , Rfl:   •  glucose blood test strip, 1 strip by Other route Daily., Disp: , Rfl:   •  hydrALAZINE (APRESOLINE) 50 MG tablet, Take 1.5 tablets by mouth 3 (Three) Times a Day., Disp: 420 tablet, Rfl: 0  •  HYDROcodone-acetaminophen (NORCO) 5-325 MG per tablet, Take 1 tablet by mouth Every 4 (Four) Hours As Needed for Moderate Pain ., Disp: , Rfl:   •  Lactobacillus Rhamnosus, GG, (PROBIOTIC COLIC) liquid, Take  by mouth., Disp: , Rfl:   •  Multiple Vitamins-Minerals (MULTIVITAMIN PO), Take 1 tablet by mouth daily., Disp: , Rfl:   •  ONETOUCH DELICA LANCETS 33G misc, USE AS DIRECTED TO CHECK BLOOD SUGAR ONCE DAILY, Disp: , Rfl:   •  pioglitazone (ACTOS) 15 MG tablet, TAKE 1 TABLET BY MOUTH EVERY DAY, Disp: , Rfl:   •  polyethylene glycol (MIRALAX) pack packet, Take 17 g by mouth Daily., Disp: , Rfl:   •  potassium chloride 10 MEQ CR tablet, TAKE 1 TABLET BY MOUTH DAILY, Disp: 90 tablet, Rfl: 2  •  sennosides-docusate sodium (SENOKOT-S) 8.6-50 MG tablet, Take 2 tablets by mouth  "Every Night., Disp: , Rfl:   •  traMADol (ULTRAM) 50 MG tablet, Take 50 mg by mouth Every 6 (Six) Hours As Needed for Moderate Pain ., Disp: , Rfl:   •  vitamin B-12 (CYANOCOBALAMIN) 1000 MCG tablet, Take 1,000 mcg by mouth Daily., Disp: , Rfl:   •  aspirin 81 MG tablet, Take 1 tablet by mouth daily., Disp: , Rfl:   •  Eliquis 5 MG tablet tablet, TAKE 1 TABLET BY MOUTH EVERY 12 HOURS, Disp: 180 tablet, Rfl: 2      Objective:     Vitals:    01/27/23 1041   BP: 146/74   Pulse: 68   Weight: 98.9 kg (218 lb)   Height: 165.1 cm (65\")     Body mass index is 36.28 kg/m².    PHYSICAL EXAM:    Neck:      Vascular: No JVD.   Pulmonary:      Effort: Pulmonary effort is normal.      Breath sounds: Normal breath sounds.   Cardiovascular:      Normal rate. Irregular rhythm.      Murmurs: There is no murmur.      No gallop. No click. No rub.   Pulses:     Intact distal pulses.           ECG 12 Lead    Date/Time: 1/27/2023 10:47 AM  Performed by: Gely Valdes APRN  Authorized by: Gely Valdes APRN   Comparison: compared with previous ECG from 1/26/2022  Similar to previous ECG  Rhythm: atrial fibrillation  Ectopy: unifocal PVCs  Rate: normal  BPM: 69  Other findings: non-specific ST-T wave changes              Assessment:       Diagnosis Plan   1. Persistent atrial fibrillation (CMS/HCC)  ECG 12 Lead      2. Nonrheumatic mitral valve regurgitation        3. Primary hypertension          Orders Placed This Encounter   Procedures   • ECG 12 Lead     This order was created via procedure documentation     Order Specific Question:   Release to patient     Answer:   Routine Release          Plan:       1.  Persistent atrial fibrillation.  She is rate controlled with carvedilol.  The Eliquis was discontinued last year due to to anemia.  We had the risks versus benefits discussion.  At this time, she elects to remain off of the Eliquis.  She is set to have repeat labs with her primary care doctor.      2.  Mitral " regurgitation.  Echocardiogram from 2019 demonstrated normal LV systolic function and mild to moderate MR.       3.  Hypertension.  I think we need better blood pressure control, and I advised increasing the hydralazine to 75 mg 3 times daily.      Overall I think she is stable and doing well.  She will follow-up with Dr. Farah in 1 year.      As always, it has been a pleasure to participate in your patient's care.      Sincerely,         CAROLA Echeverria

## 2023-03-02 ENCOUNTER — APPOINTMENT (OUTPATIENT)
Dept: WOMENS IMAGING | Facility: HOSPITAL | Age: 73
End: 2023-03-02
Payer: MEDICARE

## 2023-03-02 PROCEDURE — 77067 SCR MAMMO BI INCL CAD: CPT | Performed by: RADIOLOGY

## 2023-03-02 PROCEDURE — 77063 BREAST TOMOSYNTHESIS BI: CPT | Performed by: RADIOLOGY

## 2023-04-17 ENCOUNTER — HOSPITAL ENCOUNTER (OUTPATIENT)
Facility: HOSPITAL | Age: 73
Setting detail: OBSERVATION
Discharge: HOME | End: 2023-04-19
Attending: EMERGENCY MEDICINE | Admitting: HOSPITALIST
Payer: MEDICARE

## 2023-04-17 ENCOUNTER — APPOINTMENT (OUTPATIENT)
Dept: GENERAL RADIOLOGY | Facility: HOSPITAL | Age: 73
End: 2023-04-17
Payer: MEDICARE

## 2023-04-17 DIAGNOSIS — E87.1 HYPONATREMIA: ICD-10-CM

## 2023-04-17 DIAGNOSIS — R19.7 DIARRHEA, UNSPECIFIED TYPE: ICD-10-CM

## 2023-04-17 DIAGNOSIS — R77.8 ELEVATED TROPONIN: ICD-10-CM

## 2023-04-17 DIAGNOSIS — N18.9 ACUTE ON CHRONIC RENAL INSUFFICIENCY: Primary | ICD-10-CM

## 2023-04-17 DIAGNOSIS — R55 VASOVAGAL NEAR SYNCOPE: ICD-10-CM

## 2023-04-17 DIAGNOSIS — N28.9 ACUTE ON CHRONIC RENAL INSUFFICIENCY: Primary | ICD-10-CM

## 2023-04-17 PROBLEM — U07.1 COVID-19 VIRUS INFECTION: Status: ACTIVE | Noted: 2023-04-17

## 2023-04-17 PROBLEM — E86.0 DEHYDRATION: Status: ACTIVE | Noted: 2023-04-17

## 2023-04-17 PROBLEM — N17.9 AKI (ACUTE KIDNEY INJURY): Status: ACTIVE | Noted: 2023-04-17

## 2023-04-17 PROBLEM — R42 ORTHOSTATIC DIZZINESS: Status: ACTIVE | Noted: 2023-04-17

## 2023-04-17 LAB
ALBUMIN SERPL-MCNC: 3.5 G/DL (ref 3.5–5.2)
ALBUMIN/GLOB SERPL: 1 G/DL
ALP SERPL-CCNC: 148 U/L (ref 39–117)
ALT SERPL W P-5'-P-CCNC: 27 U/L (ref 1–33)
ANION GAP SERPL CALCULATED.3IONS-SCNC: 11 MMOL/L (ref 5–15)
AST SERPL-CCNC: 49 U/L (ref 1–32)
BILIRUB SERPL-MCNC: 0.5 MG/DL (ref 0–1.2)
BUN SERPL-MCNC: 35 MG/DL (ref 8–23)
BUN/CREAT SERPL: 15 (ref 7–25)
BURR CELLS BLD QL SMEAR: ABNORMAL
CALCIUM SPEC-SCNC: 8.5 MG/DL (ref 8.6–10.5)
CHLORIDE SERPL-SCNC: 97 MMOL/L (ref 98–107)
CO2 SERPL-SCNC: 20 MMOL/L (ref 22–29)
CREAT SERPL-MCNC: 2.33 MG/DL (ref 0.57–1)
DEPRECATED RDW RBC AUTO: 38 FL (ref 37–54)
EGFRCR SERPLBLD CKD-EPI 2021: 21.9 ML/MIN/1.73
EOSINOPHIL # BLD MANUAL: 0.05 10*3/MM3 (ref 0–0.4)
EOSINOPHIL NFR BLD MANUAL: 2 % (ref 0.3–6.2)
ERYTHROCYTE [DISTWIDTH] IN BLOOD BY AUTOMATED COUNT: 12.7 % (ref 12.3–15.4)
FLUAV RNA RESP QL NAA+PROBE: NOT DETECTED
FLUBV RNA RESP QL NAA+PROBE: NOT DETECTED
GLOBULIN UR ELPH-MCNC: 3.5 GM/DL
GLUCOSE BLDC GLUCOMTR-MCNC: 128 MG/DL (ref 70–130)
GLUCOSE BLDC GLUCOMTR-MCNC: 155 MG/DL (ref 70–130)
GLUCOSE SERPL-MCNC: 139 MG/DL (ref 65–99)
HCT VFR BLD AUTO: 34.6 % (ref 34–46.6)
HGB BLD-MCNC: 11.2 G/DL (ref 12–15.9)
HOLD SPECIMEN: NORMAL
HOLD SPECIMEN: NORMAL
HYPOCHROMIA BLD QL: ABNORMAL
LYMPHOCYTES # BLD MANUAL: 0.75 10*3/MM3 (ref 0.7–3.1)
LYMPHOCYTES NFR BLD MANUAL: 6 % (ref 5–12)
MAGNESIUM SERPL-MCNC: 2.3 MG/DL (ref 1.6–2.4)
MCH RBC QN AUTO: 26.7 PG (ref 26.6–33)
MCHC RBC AUTO-ENTMCNC: 32.4 G/DL (ref 31.5–35.7)
MCV RBC AUTO: 82.4 FL (ref 79–97)
MONOCYTES # BLD: 0.14 10*3/MM3 (ref 0.1–0.9)
NEUTROPHILS # BLD AUTO: 1.47 10*3/MM3 (ref 1.7–7)
NEUTROPHILS NFR BLD MANUAL: 61 % (ref 42.7–76)
NRBC BLD AUTO-RTO: 0 /100 WBC (ref 0–0.2)
NRBC SPEC MANUAL: 2 /100 WBC (ref 0–0.2)
PLATELET # BLD AUTO: 233 10*3/MM3 (ref 140–450)
PMV BLD AUTO: 10.1 FL (ref 6–12)
POTASSIUM SERPL-SCNC: 4.3 MMOL/L (ref 3.5–5.2)
PROT SERPL-MCNC: 7 G/DL (ref 6–8.5)
QT INTERVAL: 403 MS
RBC # BLD AUTO: 4.2 10*6/MM3 (ref 3.77–5.28)
SARS-COV-2 RNA RESP QL NAA+PROBE: DETECTED
SMALL PLATELETS BLD QL SMEAR: ADEQUATE
SODIUM SERPL-SCNC: 128 MMOL/L (ref 136–145)
TROPONIN T SERPL HS-MCNC: 105 NG/L
VARIANT LYMPHS NFR BLD MANUAL: 31 % (ref 19.6–45.3)
WBC MORPH BLD: NORMAL
WBC NRBC COR # BLD: 2.41 10*3/MM3 (ref 3.4–10.8)
WHOLE BLOOD HOLD COAG: NORMAL
WHOLE BLOOD HOLD SPECIMEN: NORMAL

## 2023-04-17 PROCEDURE — C9803 HOPD COVID-19 SPEC COLLECT: HCPCS

## 2023-04-17 PROCEDURE — 71045 X-RAY EXAM CHEST 1 VIEW: CPT

## 2023-04-17 PROCEDURE — 84484 ASSAY OF TROPONIN QUANT: CPT

## 2023-04-17 PROCEDURE — 82962 GLUCOSE BLOOD TEST: CPT

## 2023-04-17 PROCEDURE — 99285 EMERGENCY DEPT VISIT HI MDM: CPT

## 2023-04-17 PROCEDURE — 83735 ASSAY OF MAGNESIUM: CPT

## 2023-04-17 PROCEDURE — G0378 HOSPITAL OBSERVATION PER HR: HCPCS

## 2023-04-17 PROCEDURE — 85025 COMPLETE CBC W/AUTO DIFF WBC: CPT

## 2023-04-17 PROCEDURE — 93010 ELECTROCARDIOGRAM REPORT: CPT | Performed by: INTERNAL MEDICINE

## 2023-04-17 PROCEDURE — 80053 COMPREHEN METABOLIC PANEL: CPT

## 2023-04-17 PROCEDURE — 36415 COLL VENOUS BLD VENIPUNCTURE: CPT

## 2023-04-17 PROCEDURE — 93005 ELECTROCARDIOGRAM TRACING: CPT

## 2023-04-17 PROCEDURE — 85007 BL SMEAR W/DIFF WBC COUNT: CPT

## 2023-04-17 PROCEDURE — 96361 HYDRATE IV INFUSION ADD-ON: CPT

## 2023-04-17 PROCEDURE — 87636 SARSCOV2 & INF A&B AMP PRB: CPT | Performed by: EMERGENCY MEDICINE

## 2023-04-17 RX ORDER — MELATONIN
1000 DAILY
Status: DISCONTINUED | OUTPATIENT
Start: 2023-04-17 | End: 2023-04-19 | Stop reason: HOSPADM

## 2023-04-17 RX ORDER — SODIUM CHLORIDE 0.9 % (FLUSH) 0.9 %
10 SYRINGE (ML) INJECTION AS NEEDED
Status: DISCONTINUED | OUTPATIENT
Start: 2023-04-17 | End: 2023-04-19 | Stop reason: HOSPADM

## 2023-04-17 RX ORDER — SODIUM CHLORIDE 0.9 % (FLUSH) 0.9 %
10 SYRINGE (ML) INJECTION EVERY 12 HOURS SCHEDULED
Status: DISCONTINUED | OUTPATIENT
Start: 2023-04-17 | End: 2023-04-19 | Stop reason: HOSPADM

## 2023-04-17 RX ORDER — ACETAMINOPHEN 650 MG/1
650 SUPPOSITORY RECTAL EVERY 4 HOURS PRN
Status: DISCONTINUED | OUTPATIENT
Start: 2023-04-17 | End: 2023-04-19 | Stop reason: HOSPADM

## 2023-04-17 RX ORDER — ASPIRIN 81 MG/1
81 TABLET, CHEWABLE ORAL TAKE AS DIRECTED
Status: DISCONTINUED | OUTPATIENT
Start: 2023-04-17 | End: 2023-04-19 | Stop reason: HOSPADM

## 2023-04-17 RX ORDER — CHOLECALCIFEROL (VITAMIN D3) 125 MCG
1000 CAPSULE ORAL DAILY
Status: DISCONTINUED | OUTPATIENT
Start: 2023-04-17 | End: 2023-04-19 | Stop reason: HOSPADM

## 2023-04-17 RX ORDER — SODIUM CHLORIDE 9 MG/ML
75 INJECTION, SOLUTION INTRAVENOUS CONTINUOUS
Status: DISCONTINUED | OUTPATIENT
Start: 2023-04-17 | End: 2023-04-19 | Stop reason: HOSPADM

## 2023-04-17 RX ORDER — ATORVASTATIN CALCIUM 20 MG/1
10 TABLET, FILM COATED ORAL DAILY
Status: DISCONTINUED | OUTPATIENT
Start: 2023-04-17 | End: 2023-04-19 | Stop reason: HOSPADM

## 2023-04-17 RX ORDER — DEXTROSE MONOHYDRATE 25 G/50ML
25 INJECTION, SOLUTION INTRAVENOUS
Status: DISCONTINUED | OUTPATIENT
Start: 2023-04-17 | End: 2023-04-19 | Stop reason: HOSPADM

## 2023-04-17 RX ORDER — IBUPROFEN 600 MG/1
1 TABLET ORAL
Status: DISCONTINUED | OUTPATIENT
Start: 2023-04-17 | End: 2023-04-19 | Stop reason: HOSPADM

## 2023-04-17 RX ORDER — INSULIN LISPRO 100 [IU]/ML
0-9 INJECTION, SOLUTION INTRAVENOUS; SUBCUTANEOUS
Status: DISCONTINUED | OUTPATIENT
Start: 2023-04-17 | End: 2023-04-19 | Stop reason: HOSPADM

## 2023-04-17 RX ORDER — LOSARTAN POTASSIUM 50 MG/1
50 TABLET ORAL DAILY
COMMUNITY
End: 2023-04-19 | Stop reason: HOSPADM

## 2023-04-17 RX ORDER — L.ACID,PARA/B.BIFIDUM/S.THERM 8B CELL
1 CAPSULE ORAL DAILY
Status: DISCONTINUED | OUTPATIENT
Start: 2023-04-17 | End: 2023-04-19 | Stop reason: HOSPADM

## 2023-04-17 RX ORDER — PHENOL 1.4 %
600 AEROSOL, SPRAY (ML) MUCOUS MEMBRANE 2 TIMES DAILY
COMMUNITY

## 2023-04-17 RX ORDER — DIPHENOXYLATE HYDROCHLORIDE AND ATROPINE SULFATE 2.5; .025 MG/1; MG/1
1 TABLET ORAL DAILY
Status: DISCONTINUED | OUTPATIENT
Start: 2023-04-17 | End: 2023-04-19 | Stop reason: HOSPADM

## 2023-04-17 RX ORDER — ACETAMINOPHEN 160 MG/5ML
650 SOLUTION ORAL EVERY 4 HOURS PRN
Status: DISCONTINUED | OUTPATIENT
Start: 2023-04-17 | End: 2023-04-19 | Stop reason: HOSPADM

## 2023-04-17 RX ORDER — ACETAMINOPHEN 325 MG/1
650 TABLET ORAL EVERY 4 HOURS PRN
Status: DISCONTINUED | OUTPATIENT
Start: 2023-04-17 | End: 2023-04-19 | Stop reason: HOSPADM

## 2023-04-17 RX ORDER — ASPIRIN 81 MG/1
81 TABLET, CHEWABLE ORAL
COMMUNITY

## 2023-04-17 RX ORDER — FOLIC ACID 1 MG/1
1 TABLET ORAL DAILY
Status: DISCONTINUED | OUTPATIENT
Start: 2023-04-17 | End: 2023-04-19 | Stop reason: HOSPADM

## 2023-04-17 RX ORDER — CARVEDILOL 12.5 MG/1
12.5 TABLET ORAL 2 TIMES DAILY
Status: DISCONTINUED | OUTPATIENT
Start: 2023-04-17 | End: 2023-04-19 | Stop reason: HOSPADM

## 2023-04-17 RX ORDER — FLUTICASONE PROPIONATE 50 MCG
1 SPRAY, SUSPENSION (ML) NASAL DAILY
Status: DISCONTINUED | OUTPATIENT
Start: 2023-04-17 | End: 2023-04-19 | Stop reason: HOSPADM

## 2023-04-17 RX ORDER — TRAMADOL HYDROCHLORIDE 50 MG/1
50 TABLET ORAL EVERY 12 HOURS PRN
Status: DISCONTINUED | OUTPATIENT
Start: 2023-04-17 | End: 2023-04-19 | Stop reason: HOSPADM

## 2023-04-17 RX ORDER — ANASTROZOLE 1 MG/1
1 TABLET ORAL DAILY
Status: DISCONTINUED | OUTPATIENT
Start: 2023-04-17 | End: 2023-04-18

## 2023-04-17 RX ORDER — NICOTINE POLACRILEX 4 MG
15 LOZENGE BUCCAL
Status: DISCONTINUED | OUTPATIENT
Start: 2023-04-17 | End: 2023-04-19 | Stop reason: HOSPADM

## 2023-04-17 RX ORDER — ONDANSETRON 2 MG/ML
4 INJECTION INTRAMUSCULAR; INTRAVENOUS EVERY 6 HOURS PRN
Status: DISCONTINUED | OUTPATIENT
Start: 2023-04-17 | End: 2023-04-19 | Stop reason: HOSPADM

## 2023-04-17 RX ORDER — NITROGLYCERIN 0.4 MG/1
0.4 TABLET SUBLINGUAL
Status: DISCONTINUED | OUTPATIENT
Start: 2023-04-17 | End: 2023-04-19 | Stop reason: HOSPADM

## 2023-04-17 RX ORDER — SODIUM CHLORIDE 9 MG/ML
40 INJECTION, SOLUTION INTRAVENOUS AS NEEDED
Status: DISCONTINUED | OUTPATIENT
Start: 2023-04-17 | End: 2023-04-19 | Stop reason: HOSPADM

## 2023-04-17 RX ADMIN — Medication 10 ML: at 21:16

## 2023-04-17 RX ADMIN — Medication 1000 UNITS: at 21:12

## 2023-04-17 RX ADMIN — CALCIUM CARBONATE-VITAMIN D TAB 500 MG-200 UNIT 1 TABLET: 500-200 TAB at 21:10

## 2023-04-17 RX ADMIN — Medication 1 MG: at 21:12

## 2023-04-17 RX ADMIN — SODIUM CHLORIDE 100 ML/HR: 9 INJECTION, SOLUTION INTRAVENOUS at 18:16

## 2023-04-17 RX ADMIN — CARVEDILOL 12.5 MG: 12.5 TABLET, FILM COATED ORAL at 21:12

## 2023-04-17 RX ADMIN — HYDRALAZINE HYDROCHLORIDE 75 MG: 50 TABLET, FILM COATED ORAL at 21:12

## 2023-04-17 RX ADMIN — Medication 1000 MCG: at 21:10

## 2023-04-17 RX ADMIN — ANASTROZOLE 1 MG: 1 TABLET ORAL at 21:14

## 2023-04-17 RX ADMIN — Medication 1 CAPSULE: at 21:13

## 2023-04-17 RX ADMIN — ATORVASTATIN CALCIUM 10 MG: 20 TABLET, FILM COATED ORAL at 21:14

## 2023-04-17 RX ADMIN — SODIUM CHLORIDE 500 ML: 9 INJECTION, SOLUTION INTRAVENOUS at 15:17

## 2023-04-17 RX ADMIN — Medication 1 TABLET: at 21:11

## 2023-04-17 NOTE — ED TRIAGE NOTES
Pt arrives c/o being clammy, diarrhea, lightheaded feeling like she was going to pass out. Pt reports she has recently had a cold. Pt reports this has been going on since last week.     Pt reports she has A-fib

## 2023-04-17 NOTE — PLAN OF CARE
Goal Outcome Evaluation:  Plan of Care Reviewed With: patient        Progress: no change  Outcome Evaluation: Patient arrived from ED from home with spouse. AOX4, RA, denies pain.  Orientated to room. Productive cough noted..

## 2023-04-17 NOTE — H&P
Internal medicine history and physical  INTERNAL MEDICINE   [facility]       Patient Identification:  Name: Baltazar MELO  Age: 73 y.o.  Sex: female  :  1950  MRN: [id number]                   Primary Care Physician: Shameka Helms MD                               Date of admission:2023    Chief Complaint: Cough congestion and diarrhea started about 10 days ago with improvement followed by recurrence again in the last couple days.  Patient is feeling weak and clammy.    History of Present Illness:   Patient is a 73-year-old female who has complicated past medical history as noted below including diabetes, hypertension, chronic kidney disease as well as atrial fibrillation was in her usual state of health until about 7 to 10 days ago when she started having cough congestion weakness fatigue and diarrhea.  Her  has similar symptoms a week prior.  He was recovering from these symptoms as patient was developing the symptoms.  Patient tried to manage her with rest and hydration and did have some improvement initially but started to decline again with fatigue and weakness and cough not getting any better and feeling hot and cold.  Her symptom eventually reached a point that she decided to come to the emergency room where work-up revealed COVID-19 assay positive with worsening renal function.  Patient denies any fever chills chest pain abdominal pain nausea or vomiting.  She does have decreased appetite.  Patient was provided with IV fluids and is feeling better.  Patient is being admitted for further care.      Past Medical History:  Past Medical History:   Diagnosis Date   • A-fib    • Arthritis    • Cancer    • Creatinine elevation    • Diabetes mellitus    • Elevated cholesterol    • Health care maintenance    • Hyperlipidemia    • Hypertension    • Obesity    • PVC (premature ventricular contraction)      Past Surgical History:  Past Surgical History:   Procedure Laterality  Date   • BACK SURGERY     • BREAST CYST EXCISION Left    • BREAST LUMPECTOMY Right    • BREAST SURGERY Bilateral     Reduction   • CHOLECYSTECTOMY     • HYSTERECTOMY     • LUMBAR DISCECTOMY Left 8/19/2019    Procedure: Left Lumbar Three to Lumbar Four and Lumbar Four to Lumbar Five Decompression;  Surgeon: Abhijit Robertson MD;  Location: [facility] OR;  Service: Neurosurgery      Home Meds:  (Not in a hospital admission)    Current Meds:     Current Facility-Administered Medications:   •  sodium chloride 0.9 % flush 10 mL, 10 mL, Intravenous, PRN, Emergency, Triage Protocol, MD    Current Outpatient Medications:   •  ACCU-CHEK LEE PLUS test strip, USE 1 STRIP D AS INSTRUCTED, Disp: , Rfl: 5  •  acetaminophen (TYLENOL) 325 MG tablet, Take 3 tablets by mouth., Disp: , Rfl:   •  anastrozole (ARIMIDEX) 1 MG tablet,  1 Tab, Oral, Daily, # 90 Tab, 2 Refill(s), Pharmacy: [id number], , 09/14/22 12:05:00 EDT, CLINICALHEIGHT, 99.6, kg, 09/14/22 12:05:00 EDT, CLINICALWEIGHT, 165.1, Disp: , Rfl:   •  aspirin 81 MG chewable tablet, Chew 1 tablet. Pt takes three times a week, Disp: , Rfl:   •  atorvastatin (LIPITOR) 10 MG tablet, Take 1 tablet by mouth Daily., Disp: , Rfl:   •  B-D UF III MINI PEN NEEDLES 31G X 5 MM misc, INJECTING QID, Disp: , Rfl: 0  •  BIOTIN PO, Take 1 tablet by mouth Daily., Disp: , Rfl:   •  calcium carbonate (OS-MAYCOL) 600 MG tablet, Take 1 tablet by mouth 2 (Two) Times a Day., Disp: , Rfl:   •  carvedilol (COREG) 12.5 MG tablet, TAKE 1 TABLET BY MOUTH TWICE DAILY (Patient taking differently: 2 tablets 2 (Two) Times a Day With Meals.), Disp: 180 tablet, Rfl: 3  •  Cholecalciferol (Vitamin D3) 125 MCG (5000 UT) tablet dispersible, Take 5,000 Units by mouth Daily., Disp: , Rfl:   •  clindamycin (CLEOCIN) 300 MG capsule, TAKE 2 CAPSULES BY MOUTH 1 HOUR BEFORE DENTAL PROCEDURE, Disp: , Rfl:   •  Ferrous Fumarate (IRON) 18 MG tablet controlled-release, Take  by mouth 3 (Three) Times a  Day., Disp: , Rfl:   •  fluticasone (FLONASE) 50 MCG/ACT nasal spray, 1 spray into the nostril(s) as directed by provider Daily., Disp: , Rfl:   •  Folic Acid 5 MG capsule, Take  by mouth Every Other Day., Disp: , Rfl:   •  glucose blood test strip, 1 each by Other route Daily., Disp: , Rfl:   •  hydrALAZINE (APRESOLINE) 50 MG tablet, Take 1.5 tablets by mouth 3 (Three) Times a Day., Disp: 420 tablet, Rfl: 0  •  Lactobacillus Rhamnosus, GG, (PROBIOTIC COLIC) liquid, Take  by mouth., Disp: , Rfl:   •  losartan (COZAAR) 50 MG tablet, Take 1 tablet by mouth Daily., Disp: , Rfl:   •  Multiple Vitamins-Minerals (MULTIVITAMIN PO), Take 1 tablet by mouth Daily., Disp: , Rfl:   •  ONETOUCH DELICA LANCETS 33G misc, USE AS DIRECTED TO CHECK BLOOD SUGAR ONCE DAILY, Disp: , Rfl:   •  pioglitazone (ACTOS) 15 MG tablet, TAKE 1 TABLET BY MOUTH EVERY DAY, Disp: , Rfl:   •  polyethylene glycol (MIRALAX) pack packet, Take 17 g by mouth Daily., Disp: , Rfl:   •  potassium chloride 10 MEQ CR tablet, TAKE 1 TABLET BY MOUTH DAILY, Disp: 90 tablet, Rfl: 2  •  sennosides-docusate sodium (SENOKOT-S) 8.6-50 MG tablet, Take 2 tablets by mouth Every Night., Disp: , Rfl:   •  traMADol (ULTRAM) 50 MG tablet, Take 1 tablet by mouth Every 6 (Six) Hours As Needed for Moderate Pain., Disp: , Rfl:   •  vitamin B-12 (CYANOCOBALAMIN) 1000 MCG tablet, Take 1 tablet by mouth Daily., Disp: , Rfl:   •  Flaxseed, Linseed, (FLAX SEEDS) powder, Take  by mouth., Disp: , Rfl:   •  HYDROcodone-acetaminophen (NORCO) 5-325 MG per tablet, Take 1 tablet by mouth Every 4 (Four) Hours As Needed for Moderate Pain ., Disp: , Rfl:   Allergies:  Allergies   Allergen Reactions   • Adhesive Tape Dermatitis and Other (See Comments)     Blisters   • Cefaclor Dizziness   • Ibuprofen Other (See Comments)     Kidney dysfunction    • Penicillins Dizziness   • Levofloxacin Other (See Comments)   • Ascorbic Acid & Derivatives Other (See Comments)     Epistaxis   • Latex Rash  "    Social History:   Social History     Tobacco Use   • Smoking status: Former     Packs/day: 0.25     Years: 0.50     Pack years: 0.13     Types: Cigarettes     Quit date: 1972     Years since quittin.3   • Smokeless tobacco: Never   • Tobacco comments:     NO CAFFEINE USE   Substance Use Topics   • Alcohol use: No      Family History:  Family History   Problem Relation Age of Onset   • Heart disease Father    • Cancer Mother    • No Known Problems Maternal Grandmother    • No Known Problems Maternal Grandfather    • No Known Problems Paternal Grandmother    • No Known Problems Paternal Grandfather           Review of Systems  See history of present illness and past medical history.    As described in the history of presenting illness.      Vitals:   /91   Pulse 76   Temp 97.1 °F (36.2 °C) (Tympanic)   Resp 16   Ht 162.6 cm (64\")   Wt 98.9 kg (218 lb)   SpO2 100%   BMI 37.42 kg/m²   I/O:     Intake/Output Summary (Last 24 hours) at 2023 1638  Last data filed at 2023 1630  Gross per 24 hour   Intake 500 ml   Output --   Net 500 ml     Exam:  Patient is examined using the personal protective equipment as per guidelines from infection control for this particular patient as enacted.  Hand washing was performed before and after patient interaction.  General Appearance:   Alert cooperative pleasant female who does not appear to be in any acute distress.   Head:    Normocephalic, without obvious abnormality, atraumatic   Eyes:    PERRL, conjunctiva/corneas clear, EOM's intact, both eyes   Ears:    Normal external ear canals, both ears   Nose:   Nares normal, septum midline, mucosa normal, no drainage    or sinus tenderness   Throat:   Lips, tongue, gums normal; oral mucosa pink and moist   Neck:   Supple, symmetrical, trachea midline, no adenopathy;     thyroid:  no enlargement/tenderness/nodules; no carotid    bruit or JVD   Back:     Symmetric, no curvature, ROM normal, no CVA tenderness "   Lungs:    Bilateral air entry clear to auscultation obvious use of accessory muscles of breathing noted.   Chest Wall:    No tenderness or deformity    Heart:   S2 irregular   Abdomen:    Obese soft no guarding rigidity or rebound noted   Extremities:   Extremities normal, atraumatic, no cyanosis or edema   Pulses:   Pulses palpable in all extremities; symmetric all extremities   Skin:   Skin color normal, Skin is warm and dry,  no rashes or palpable lesions   Neurologic:  Grossly nonfocal alert and oriented x3       Data Review:      I reviewed the patient's new clinical results.  Results from last 7 days   Lab Units 04/17/23  1255   WBC 10*3/mm3 2.41*   HEMOGLOBIN g/dL 11.2*   PLATELETS 10*3/mm3 233     Results from last 7 days   Lab Units 04/17/23  1255   SODIUM mmol/L 128*   POTASSIUM mmol/L 4.3   CHLORIDE mmol/L 97*   CO2 mmol/L 20.0*   BUN mg/dL 35*   CREATININE mg/dL 2.33*   CALCIUM mg/dL 8.5*   GLUCOSE mg/dL 139*     Microbiology Results (last 10 days)     Procedure Component Value - Date/Time    COVID-19 and FLU A/B PCR - Swab, Nasopharynx [[id number]]  (Abnormal) Collected: 04/17/23 1454    Lab Status: Final result Specimen: Swab from Nasopharynx Updated: 04/17/23 1606     COVID19 Detected     Influenza A PCR Not Detected     Influenza B PCR Not Detected    Narrative:      Fact sheet for providers: https://www.fda.gov/media/630289/download    Fact sheet for patients: https://www.fda.gov/media/642191/download    Test performed by PCR.      .rad  ECG 12 Lead Syncope   Final Result   HEART RATE= 73  bpm   RR Interval= 822  ms   GA Interval=   ms   P Horizontal Axis=   deg   P Front Axis=   deg   QRSD Interval= 98  ms   QT Interval= 403  ms   QRS Axis= -38  deg   T Wave Axis= 161  deg   - ABNORMAL ECG -   Atrial fibrillation   Left axis deviation   Low voltage, precordial leads   Consider anterior infarct   Borderline repolarization abnormality   No change from previous tracing   Electronically Signed By:  Yudy Bowens ([facility]) 17-Apr-2023 17:15:40   Date and Time of Study: 2023-04-17 12:46:56      SCANNED - TELEMETRY     Final Result          Assessment:  Active Hospital Problems    Diagnosis  POA   • **Acute on chronic renal insufficiency [N28.9, N18.9]  Yes   • Dehydration [E86.0]  Unknown   • Hyponatremia [E87.1]  Unknown   • Orthostatic dizziness [R42]  Unknown   • COVID-19 virus infection [U07.1]  Unknown   • Diarrhea [R19.7]  Unknown   • A-fib [I48.91]  Unknown   • Diabetes mellitus [E11.9]  Yes   • Hypertension [I10]  Yes       Medical decision making/care plan:  Systemic COVID-19 infection without hypoxia -plan is to provide her with hydration and closely monitor for need for oxygen supplementation.  Patient is beyond the window of benefit from treatment with remdesivir given the duration of symptoms being greater than 7 days.  Acute on chronic renal insufficiency likely due to diarrhea and decreased intake and ongoing use of losartan-plan is to provide her with IV fluids hold her losartan and reassess her renal function.  Hypertension with orthostatic dizziness likely secondary to decreased intake and dehydration with ongoing use of blood pressure medications in the setting of COVID-19 infection and diarrhea.  Plan is to provide her with IV fluids and monitor her orthostatic blood pressure.  Diabetes mellitus-continue with Accu-Cheks and sliding scale coverage and watch for hypoglycemia.  Allow her diet and check hemoglobin A1c.  Atrial fibrillation-plan is to continue her rate control and upon review of the recent cardiology visit in January 2023 it is noted that patient used to be on anticoagulation therapy which was discontinued because of anemia and she is to remain off of anticoagulation therapy with continued iron replacement.  Patient was at that time of the visit noted to be stable and recommended follow-up with cardiology was  in 1 year.  Anemia-stable and improved-multifactorial it was the reason why  her anticoagulation was discontinued.  It is improved on iron replacement therapy.  Plan is to restart her iron replacement therapy once her oral intake is improved.  Alpesh Patel MD   4/17/2023  16:38 EDT    Parts of this note may be an electronic transcription/translation of spoken language to printed text using the Dragon dictation system.

## 2023-04-17 NOTE — ED PROVIDER NOTES
EMERGENCY DEPARTMENT ENCOUNTER    Room Number:  15/15  Date seen:  4/17/2023  PCP: Shameka Helms MD  Historian: Patient,       HPI:  Chief Complaint: Near syncope  A complete HPI/ROS/PMH/PSH/SH/FH are unobtainable due to: Nothing  Context: Baltazar MELO is a 73 y.o. female, with a history of atrial fibrillation, who presents to the ED by private vehicle from home c/o near syncope.  Patient had an episode of diarrhea this morning.  After standing up from the toilet, she became lightheaded, faint, nauseated, and clammy.  She felt like she was going to pass out but she never lost consciousness.  Patient had diarrhea for several days last week.  She also complains of a productive cough with clear yellow sputum for the past several days.  Her cough has been better for the past 2 days.  Denies fever, chills, chest pain, shortness of breath, abdominal pain, vomiting, headache, palpitations, or numbness/tingling/weakness in her extremities.  She also reports decreased appetite and feels like she is dehydrated.  She has a history of chronic kidney disease.  She is not anticoagulated.            PAST MEDICAL HISTORY  Active Ambulatory Problems     Diagnosis Date Noted   • Hypertension 03/14/2016   • Lumbar back pain with radiculopathy affecting right lower extremity 08/13/2019   • Diabetes mellitus 08/13/2019   • Renal lesion 08/13/2019   • Persistent atrial fibrillation (CMS/[facility]) 08/13/2019   • Post-operative state 11/25/2019   • Weakness of both lower extremities 11/25/2019   • Nonrheumatic mitral valve regurgitation 01/27/2023     Resolved Ambulatory Problems     Diagnosis Date Noted   • Morbid obesity with BMI of 40.0-44.9, adult ([facility]) 03/14/2016   • Spinal stenosis, lumbar region, with neurogenic claudication 08/15/2019     Past Medical History:   Diagnosis Date   • A-fib    • Arthritis    • Cancer    • Creatinine elevation    • Elevated cholesterol    • Health care maintenance    • Hyperlipidemia    •  Obesity    • PVC (premature ventricular contraction)          PAST SURGICAL HISTORY  Past Surgical History:   Procedure Laterality Date   • BACK SURGERY     • BREAST CYST EXCISION Left    • BREAST LUMPECTOMY Right    • BREAST SURGERY Bilateral     Reduction   • CHOLECYSTECTOMY     • HYSTERECTOMY     • LUMBAR DISCECTOMY Left 2019    Procedure: Left Lumbar Three to Lumbar Four and Lumbar Four to Lumbar Five Decompression;  Surgeon: Abhijit Robertson MD;  Location: [facility] OR;  Service: Neurosurgery         FAMILY HISTORY  Family History   Problem Relation Age of Onset   • Heart disease Father    • Cancer Mother    • No Known Problems Maternal Grandmother    • No Known Problems Maternal Grandfather    • No Known Problems Paternal Grandmother    • No Known Problems Paternal Grandfather          SOCIAL HISTORY  Social History     Socioeconomic History   • Marital status:    Tobacco Use   • Smoking status: Former     Packs/day: 0.25     Years: 0.50     Pack years: 0.13     Types: Cigarettes     Quit date: 1972     Years since quittin.3   • Smokeless tobacco: Never   • Tobacco comments:     NO CAFFEINE USE   Substance and Sexual Activity   • Alcohol use: No   • Drug use: No   • Sexual activity: Defer         ALLERGIES  Adhesive tape, Cefaclor, Ibuprofen, Penicillins, Levofloxacin, Ascorbic acid & derivatives, and Latex        REVIEW OF SYSTEMS  Review of Systems     All systems have been reviewed and are negative except as as discussed in the HPI    PHYSICAL EXAM  ED Triage Vitals   Temp Heart Rate Resp BP SpO2   23 1220 23 1220 23 1220 23 1245 23 1220   97.1 °F (36.2 °C) 92 16 153/86 99 %      Temp src Heart Rate Source Patient Position BP Location FiO2 (%)   23 1220 23 1220 23 1245 -- --   Tympanic Monitor Sitting         Physical Exam      GENERAL: Awake, alert, oriented x3.  Well-developed, well-nourished elderly female.  Resting comfortably in  no acute distress  HENT: NCAT, nares patent, moist mucous membranes  EYES: no scleral icterus  CV: Irregularly irregular rhythm, normal rate  RESPIRATORY: normal effort, clear to auscultation bilaterally  ABDOMEN: soft, nontender  MUSCULOSKELETAL: Extremities are nontender with full range of motion  NEURO: Speech is normal.  No facial droop.  Follows commands  PSYCH:  calm, cooperative  SKIN: warm, dry    Vital signs and nursing notes reviewed.          LAB RESULTS  Recent Results (from the past 24 hour(s))   ECG 12 Lead Syncope    Collection Time: 04/17/23 12:46 PM   Result Value Ref Range    QT Interval 403 ms   Comprehensive Metabolic Panel    Collection Time: 04/17/23 12:55 PM    Specimen: Blood   Result Value Ref Range    Glucose 139 (H) 65 - 99 mg/dL    BUN 35 (H) 8 - 23 mg/dL    Creatinine 2.33 (H) 0.57 - 1.00 mg/dL    Sodium 128 (L) 136 - 145 mmol/L    Potassium 4.3 3.5 - 5.2 mmol/L    Chloride 97 (L) 98 - 107 mmol/L    CO2 20.0 (L) 22.0 - 29.0 mmol/L    Calcium 8.5 (L) 8.6 - 10.5 mg/dL    Total Protein 7.0 6.0 - 8.5 g/dL    Albumin 3.5 3.5 - 5.2 g/dL    ALT (SGPT) 27 1 - 33 U/L    AST (SGOT) 49 (H) 1 - 32 U/L    Alkaline Phosphatase 148 (H) 39 - 117 U/L    Total Bilirubin 0.5 0.0 - 1.2 mg/dL    Globulin 3.5 gm/dL    A/G Ratio 1.0 g/dL    BUN/Creatinine Ratio 15.0 7.0 - 25.0    Anion Gap 11.0 5.0 - 15.0 mmol/L    eGFR 21.9 (L) >60.0 mL/min/1.73   Magnesium    Collection Time: 04/17/23 12:55 PM    Specimen: Blood   Result Value Ref Range    Magnesium 2.3 1.6 - 2.4 mg/dL   Single High Sensitivity Troponin T    Collection Time: 04/17/23 12:55 PM    Specimen: Blood   Result Value Ref Range    HS Troponin T 105 (C) <10 ng/L   Green Top (Gel)    Collection Time: 04/17/23 12:55 PM   Result Value Ref Range    Extra Tube Hold for add-ons.    Lavender Top    Collection Time: 04/17/23 12:55 PM   Result Value Ref Range    Extra Tube hold for add-on    Gold Top - SST    Collection Time: 04/17/23 12:55 PM   Result Value  Ref Range    Extra Tube Hold for add-ons.    Light Blue Top    Collection Time: 04/17/23 12:55 PM   Result Value Ref Range    Extra Tube Hold for add-ons.    CBC Auto Differential    Collection Time: 04/17/23 12:55 PM    Specimen: Blood   Result Value Ref Range    WBC 2.41 (L) 3.40 - 10.80 10*3/mm3    RBC 4.20 3.77 - 5.28 10*6/mm3    Hemoglobin 11.2 (L) 12.0 - 15.9 g/dL    Hematocrit 34.6 34.0 - 46.6 %    MCV 82.4 79.0 - 97.0 fL    MCH 26.7 26.6 - 33.0 pg    MCHC 32.4 31.5 - 35.7 g/dL    RDW 12.7 12.3 - 15.4 %    RDW-SD 38.0 37.0 - 54.0 fl    MPV 10.1 6.0 - 12.0 fL    Platelets 233 140 - 450 10*3/mm3    nRBC 0.0 0.0 - 0.2 /100 WBC   Manual Differential    Collection Time: 04/17/23 12:55 PM    Specimen: Blood   Result Value Ref Range    Neutrophil % 61.0 42.7 - 76.0 %    Lymphocyte % 31.0 19.6 - 45.3 %    Monocyte % 6.0 5.0 - 12.0 %    Eosinophil % 2.0 0.3 - 6.2 %    Neutrophils Absolute 1.47 (L) 1.70 - 7.00 10*3/mm3    Lymphocytes Absolute 0.75 0.70 - 3.10 10*3/mm3    Monocytes Absolute 0.14 0.10 - 0.90 10*3/mm3    Eosinophils Absolute 0.05 0.00 - 0.40 10*3/mm3    nRBC 2.0 (H) 0.0 - 0.2 /100 WBC    Marcellus Cells Mod/2+ None Seen    Hypochromia Slight/1+ None Seen    WBC Morphology Normal Normal    Platelet Estimate Adequate Normal       Ordered the above labs and reviewed the results.        RADIOLOGY  XR Chest 1 View    Result Date: 4/17/2023  XR CHEST 1 VW-  HISTORY: Female who is 73 years-old,  cough  TECHNIQUE: Frontal view of the chest  COMPARISON: None available  FINDINGS: The heart is enlarged. Pulmonary vasculature is unremarkable. No focal pulmonary consolidation, pleural effusion, or pneumothorax. No acute osseous process.      No focal pulmonary consolidation. Cardiomegaly. Follow-up as clinical indications persist.  This report was finalized on 4/17/2023 3:10 PM by Dr. Jorge Vidal M.D.        Ordered the above noted radiological studies. Reviewed by me in PACS.             PROCEDURES  Procedures              MEDICATIONS GIVEN IN ER  Medications   sodium chloride 0.9 % flush 10 mL (has no administration in time range)   sodium chloride 0.9 % bolus 500 mL (has no administration in time range)                   MEDICAL DECISION MAKING, PROGRESS, and CONSULTS    All labs have been independently reviewed by me.  All radiology studies have been reviewed by me and I have also reviewed the radiology report.   EKG's independently viewed and interpreted by me.  Discussion below represents my analysis of pertinent findings related to patient's condition, differential diagnosis, treatment plan and final disposition.      Additional sources:  - Discussed/ obtained information from independent historians:  at bedside    - External (non-ED) record review: Patient was last admitted here in August 2019 for low back pain, acute kidney injury, and A-fib.  Patient underwent lumbar decompression surgery.  Patient saw Dr. Nichols, her nephrologist, on 3/23/2023 for follow-up for chronic kidney disease.  Creatinine was 1.9 on 3/16/2023.    - Chronic or social conditions impacting care: N/A          Orders placed during this visit:  Orders Placed This Encounter   Procedures   • COVID-19 and FLU A/B PCR - Swab, Nasopharynx   • XR Chest 1 View   • [place] Draw   • Comprehensive Metabolic Panel   • Magnesium   • Single High Sensitivity Troponin T   • CBC Auto Differential   • Manual Differential   • NPO Diet NPO Type: Strict NPO   • Undress & Gown   • Cardiac Monitoring   • Continuous Pulse Oximetry   • Vital Signs   • Orthostatic Blood Pressure   • LHA (on-call MD unless specified) Details   • Oxygen Therapy- Nasal Cannula; 2 LPM; Titrate for SPO2: equal to or greater than, 92%   • POC Glucose Once   • ECG 12 Lead Syncope   • Insert Peripheral IV   • Initiate Observation Status   • CBC & Differential   • Green Top (Gel)   • Lavender Top   • Gold Top - SST   • Light Blue Top          Additional orders considered but not ordered:  N/A        Differential diagnosis:    Vasovagal episode, orthostatic hypotension, dehydration, enteritis, volume depletion, electrolyte abnormality, arrhythmia      Independent interpretation of labs, radiology studies, and discussions with consultants:  ED Course as of 04/17/23 1517   Mon Apr 17, 2023   1400 Hemoglobin(!): 11.2 [WH]   1401 WBC(!): 2.41 [WH]   1401 HS Troponin T(!!): 105 [WH]   1401 Glucose(!): 139 [WH]   1401 BUN(!): 35 [WH]   1401 Creatinine(!): 2.33  1.6 one year ago [WH]   1401 Sodium(!): 128 [WH]   1410 EKG personally interpreted by me.  My personal interpretation is:         EKG time: 12:46 PM  Rhythm/Rate: Atrial fibrillation, rate 73  P waves and IN: Irregular, varying  QRS, axis: LAD, anterior Q waves  ST and T waves: Nonspecific ST/T wave changes in the lateral and anterior leads    Interpreted Contemporaneously by me, independently viewed  EKG is not significantly changed compared to prior EKG done on 8/12/2019   [WH]   1428 Case discussed with Dr. Patel and he agrees to admit the patient to a monitored bed.  Pertinent history, exam findings, test results, ED course, and diagnoses were discussed with him. [WH]   1515 Chest x-ray personally interpreted by me.  My personal interpretation is: Heart size is normal.  Lungs are clear.  No pleural effusion. [WH]   1516 Patient presented the ED after having a near syncopal episode.  This occurred after she had a bowel movement.  She did not lose consciousness.  She reports intermittent diarrhea for the past several days.  Creatinine was elevated from baseline.  Patient is likely mildly dehydrated.  She was given IV fluids in the ED.  Patient has chronic A-fib.  EKG did not have any acute ischemic changes.  Troponin was 105.  Patient denied chest pain.  Troponin may be elevated due to underlying kidney disease.  Case was discussed with the hospitalist and she will be admitted. [WH]      ED  Course User Index  [WH] Harjit Joseph MD               DIAGNOSIS  Final diagnoses:   Acute on chronic renal insufficiency   Hyponatremia   Elevated troponin   Vasovagal near syncope   Diarrhea, unspecified type         DISPOSITION  ADMISSION    Discussed treatment plan and reason for admission with pt/family and admitting physician.  Pt/family voiced understanding of the plan for admission for further testing/treatment as needed.                 Latest Documented Vital Signs:  As of 15:17 EDT  BP- 173/91 HR- 76 Temp- 97.1 °F (36.2 °C) (Tympanic) O2 sat- 100%              --    Please note that portions of this were completed with a voice recognition program.       Note Disclaimer: At [facility], we believe that sharing information builds trust and better relationships. You are receiving this note because you are receiving care at [facility] or recently visited. It is possible you will see health information before a provider has talked with you about it. This kind of information can be easy to misunderstand. To help you fully understand what it means for your health, we urge you to discuss this note with your provider.           Harjit Joseph MD  04/17/23 [id number]

## 2023-04-17 NOTE — ED TRIAGE NOTES
Pt has had a chest cold and diarrhea x 2 weeks.  Today she was clammy and she felt like she was going to pass out

## 2023-04-17 NOTE — ED NOTES
Nursing report ED to floor  Baltazar MELO  73 y.o.  female    HPI :   Chief Complaint   Patient presents with    Syncope    Diarrhea       Admitting doctor:   Alpesh Patel MD    Admitting diagnosis:   The primary encounter diagnosis was Acute on chronic renal insufficiency. Diagnoses of Hyponatremia, Elevated troponin, Vasovagal near syncope, and Diarrhea, unspecified type were also pertinent to this visit.    Code status:   Current Code Status       Date Active Code Status Order ID Comments User Context       Prior            Allergies:   Adhesive tape, Cefaclor, Ibuprofen, Penicillins, Levofloxacin, Ascorbic acid & derivatives, and Latex    Intake and Output  No intake or output data in the 24 hours ending 04/17/23 1558    Weight:       04/17/23  1425   Weight: 98.9 kg (218 lb)       Most recent vitals:   Vitals:    04/17/23 1402 04/17/23 1425 04/17/23 1431 04/17/23 1501   BP: 174/84  158/97 173/91   Patient Position:       Pulse: 74   76   Resp:       Temp:       TempSrc:       SpO2: 100%   100%   Weight:  98.9 kg (218 lb)     Height:           Active LDAs/IV Access:   Lines, Drains & Airways       Active LDAs       Name Placement date Placement time Site Days    Peripheral IV 04/17/23 1517 Left Wrist 04/17/23  1517  Wrist  less than 1                    Labs (abnormal labs have a star):   Labs Reviewed   COMPREHENSIVE METABOLIC PANEL - Abnormal; Notable for the following components:       Result Value    Glucose 139 (*)     BUN 35 (*)     Creatinine 2.33 (*)     Sodium 128 (*)     Chloride 97 (*)     CO2 20.0 (*)     Calcium 8.5 (*)     AST (SGOT) 49 (*)     Alkaline Phosphatase 148 (*)     eGFR 21.9 (*)     All other components within normal limits    Narrative:     GFR Normal >60  Chronic Kidney Disease <60  Kidney Failure <15    The GFR formula is only valid for adults with stable renal function between ages 18 and 70.   SINGLE HSTROPONIN T - Abnormal; Notable for the following components:    HS Troponin T  105 (*)     All other components within normal limits    Narrative:     High Sensitive Troponin T Reference Range:  <10.0 ng/L- Negative Female for AMI  <15.0 ng/L- Negative Male for AMI  >=10 - Abnormal Female indicating possible myocardial injury.  >=15 - Abnormal Male indicating possible myocardial injury.   Clinicians would have to utilize clinical acumen, EKG, Troponin, and serial changes to determine if it is an Acute Myocardial Infarction or myocardial injury due to an underlying chronic condition.        CBC WITH AUTO DIFFERENTIAL - Abnormal; Notable for the following components:    WBC 2.41 (*)     Hemoglobin 11.2 (*)     All other components within normal limits   MANUAL DIFFERENTIAL - Abnormal; Notable for the following components:    Neutrophils Absolute 1.47 (*)     nRBC 2.0 (*)     All other components within normal limits   MAGNESIUM - Normal   COVID-19 AND FLU A/B, NP SWAB IN TRANSPORT MEDIA 8-12 HR TAT   RAINBOW DRAW    Narrative:     The following orders were created for panel order [place] Draw.  Procedure                               Abnormality         Status                     ---------                               -----------         ------                     Green Top (Gel)[[id number]]                                  Final result               Lavender Top[[id number]]                                     Final result               Gold Top - SST[[id number]]                                   Final result               Light Blue Top[[id number]]                                   Final result                 Please view results for these tests on the individual orders.   CBC AND DIFFERENTIAL    Narrative:     The following orders were created for panel order CBC & Differential.  Procedure                               Abnormality         Status                     ---------                               -----------         ------                     CBC Auto Differential[[id number]]         Abnormal            Final result                 Please view results for these tests on the individual orders.   GREEN TOP   LAVENDER TOP   GOLD TOP - SST   LIGHT BLUE TOP       EKG:   ECG 12 Lead Syncope   Preliminary Result   HEART RATE= 73  bpm   RR Interval= 822  ms   OR Interval=   ms   P Horizontal Axis=   deg   P Front Axis=   deg   QRSD Interval= 98  ms   QT Interval= 403  ms   QRS Axis= -38  deg   T Wave Axis= 161  deg   - ABNORMAL ECG -   Atrial fibrillation   Left axis deviation   Low voltage, precordial leads   Consider anterior infarct   Borderline repolarization abnormality   Electronically Signed By:    Date and Time of Study: 2023 12:46:56          Meds given in ED:   Medications   sodium chloride 0.9 % flush 10 mL (has no administration in time range)   sodium chloride 0.9 % bolus 500 mL (500 mL Intravenous New Bag 23 [id number])       Imaging results:  XR Chest 1 View    Result Date: 2023  No focal pulmonary consolidation. Cardiomegaly. Follow-up as clinical indications persist.  This report was finalized on 2023 3:10 PM by Dr. Jorge Vidal M.D.       Ambulatory status:   - Up with assist    Social issues:   Social History     Socioeconomic History    Marital status:    Tobacco Use    Smoking status: Former     Packs/day: 0.25     Years: 0.50     Pack years: 0.13     Types: Cigarettes     Quit date: 1972     Years since quittin.3    Smokeless tobacco: Never    Tobacco comments:     NO CAFFEINE USE   Substance and Sexual Activity    Alcohol use: No    Drug use: No    Sexual activity: Defer       NIH Stroke Scale:        Nursing report ED to floor:

## 2023-04-18 PROBLEM — R42 ORTHOSTATIC DIZZINESS: Status: RESOLVED | Noted: 2023-04-17 | Resolved: 2023-04-18

## 2023-04-18 PROBLEM — D63.8 ANEMIA, CHRONIC DISEASE: Status: ACTIVE | Noted: 2023-04-18

## 2023-04-18 LAB
ALBUMIN SERPL-MCNC: 2.6 G/DL (ref 3.5–5.2)
ALBUMIN/GLOB SERPL: 1.1 G/DL
ALP SERPL-CCNC: 105 U/L (ref 39–117)
ALT SERPL W P-5'-P-CCNC: 16 U/L (ref 1–33)
ANION GAP SERPL CALCULATED.3IONS-SCNC: 8 MMOL/L (ref 5–15)
ANISOCYTOSIS BLD QL: ABNORMAL
AST SERPL-CCNC: 33 U/L (ref 1–32)
BILIRUB SERPL-MCNC: 0.3 MG/DL (ref 0–1.2)
BUN SERPL-MCNC: 28 MG/DL (ref 8–23)
BUN/CREAT SERPL: 15.3 (ref 7–25)
BURR CELLS BLD QL SMEAR: ABNORMAL
CALCIUM SPEC-SCNC: 7.2 MG/DL (ref 8.6–10.5)
CHLORIDE SERPL-SCNC: 111 MMOL/L (ref 98–107)
CO2 SERPL-SCNC: 18 MMOL/L (ref 22–29)
CREAT SERPL-MCNC: 1.83 MG/DL (ref 0.57–1)
DEPRECATED RDW RBC AUTO: 37.8 FL (ref 37–54)
EGFRCR SERPLBLD CKD-EPI 2021: 29.2 ML/MIN/1.73
EOSINOPHIL # BLD MANUAL: 0.04 10*3/MM3 (ref 0–0.4)
EOSINOPHIL NFR BLD MANUAL: 2 % (ref 0.3–6.2)
ERYTHROCYTE [DISTWIDTH] IN BLOOD BY AUTOMATED COUNT: 12.8 % (ref 12.3–15.4)
GEN 5 2HR TROPONIN T REFLEX: 89 NG/L
GLOBULIN UR ELPH-MCNC: 2.3 GM/DL
GLUCOSE BLDC GLUCOMTR-MCNC: 106 MG/DL (ref 70–130)
GLUCOSE BLDC GLUCOMTR-MCNC: 119 MG/DL (ref 70–130)
GLUCOSE BLDC GLUCOMTR-MCNC: 147 MG/DL (ref 70–130)
GLUCOSE BLDC GLUCOMTR-MCNC: 91 MG/DL (ref 70–130)
GLUCOSE SERPL-MCNC: 80 MG/DL (ref 65–99)
HBA1C MFR BLD: 6.2 % (ref 4.8–5.6)
HCT VFR BLD AUTO: 27.4 % (ref 34–46.6)
HGB BLD-MCNC: 8.9 G/DL (ref 12–15.9)
LYMPHOCYTES # BLD MANUAL: 0.43 10*3/MM3 (ref 0.7–3.1)
LYMPHOCYTES NFR BLD MANUAL: 10.2 % (ref 5–12)
MCH RBC QN AUTO: 26.3 PG (ref 26.6–33)
MCHC RBC AUTO-ENTMCNC: 32.5 G/DL (ref 31.5–35.7)
MCV RBC AUTO: 81.1 FL (ref 79–97)
MONOCYTES # BLD: 0.23 10*3/MM3 (ref 0.1–0.9)
NEUTROPHILS # BLD AUTO: 1.53 10*3/MM3 (ref 1.7–7)
NEUTROPHILS NFR BLD MANUAL: 68.4 % (ref 42.7–76)
NRBC BLD AUTO-RTO: 0 /100 WBC (ref 0–0.2)
PHOSPHATE SERPL-MCNC: 2.2 MG/DL (ref 2.5–4.5)
PLAT MORPH BLD: NORMAL
PLATELET # BLD AUTO: 196 10*3/MM3 (ref 140–450)
PMV BLD AUTO: 9.6 FL (ref 6–12)
POIKILOCYTOSIS BLD QL SMEAR: ABNORMAL
POTASSIUM SERPL-SCNC: 3.4 MMOL/L (ref 3.5–5.2)
PROT SERPL-MCNC: 4.9 G/DL (ref 6–8.5)
RBC # BLD AUTO: 3.38 10*6/MM3 (ref 3.77–5.28)
SMUDGE CELLS BLD QL SMEAR: ABNORMAL
SODIUM SERPL-SCNC: 137 MMOL/L (ref 136–145)
TROPONIN T DELTA: 9 NG/L
TROPONIN T SERPL HS-MCNC: 80 NG/L
VARIANT LYMPHS NFR BLD MANUAL: 17.3 % (ref 19.6–45.3)
VARIANT LYMPHS NFR BLD MANUAL: 2 % (ref 0–5)
WBC NRBC COR # BLD: 2.23 10*3/MM3 (ref 3.4–10.8)

## 2023-04-18 PROCEDURE — G0378 HOSPITAL OBSERVATION PER HR: HCPCS

## 2023-04-18 PROCEDURE — 96374 THER/PROPH/DIAG INJ IV PUSH: CPT

## 2023-04-18 PROCEDURE — 25010000002 ONDANSETRON PER 1 MG: Performed by: INTERNAL MEDICINE

## 2023-04-18 PROCEDURE — 85007 BL SMEAR W/DIFF WBC COUNT: CPT | Performed by: INTERNAL MEDICINE

## 2023-04-18 PROCEDURE — 96361 HYDRATE IV INFUSION ADD-ON: CPT

## 2023-04-18 PROCEDURE — 85025 COMPLETE CBC W/AUTO DIFF WBC: CPT | Performed by: INTERNAL MEDICINE

## 2023-04-18 PROCEDURE — 80053 COMPREHEN METABOLIC PANEL: CPT | Performed by: INTERNAL MEDICINE

## 2023-04-18 PROCEDURE — 84484 ASSAY OF TROPONIN QUANT: CPT | Performed by: NURSE PRACTITIONER

## 2023-04-18 PROCEDURE — 83036 HEMOGLOBIN GLYCOSYLATED A1C: CPT | Performed by: INTERNAL MEDICINE

## 2023-04-18 PROCEDURE — 82962 GLUCOSE BLOOD TEST: CPT

## 2023-04-18 PROCEDURE — 84100 ASSAY OF PHOSPHORUS: CPT | Performed by: NURSE PRACTITIONER

## 2023-04-18 RX ORDER — HYDRALAZINE HYDROCHLORIDE 50 MG/1
50 TABLET, FILM COATED ORAL 3 TIMES DAILY
Status: DISCONTINUED | OUTPATIENT
Start: 2023-04-18 | End: 2023-04-19 | Stop reason: HOSPADM

## 2023-04-18 RX ORDER — ANASTROZOLE 1 MG/1
1 TABLET ORAL NIGHTLY
Status: DISCONTINUED | OUTPATIENT
Start: 2023-04-18 | End: 2023-04-19 | Stop reason: HOSPADM

## 2023-04-18 RX ORDER — POTASSIUM CHLORIDE 750 MG/1
40 TABLET, FILM COATED, EXTENDED RELEASE ORAL ONCE
Status: COMPLETED | OUTPATIENT
Start: 2023-04-18 | End: 2023-04-18

## 2023-04-18 RX ADMIN — Medication 10 ML: at 10:03

## 2023-04-18 RX ADMIN — HYDRALAZINE HYDROCHLORIDE 75 MG: 50 TABLET, FILM COATED ORAL at 10:01

## 2023-04-18 RX ADMIN — ATORVASTATIN CALCIUM 10 MG: 20 TABLET, FILM COATED ORAL at 20:11

## 2023-04-18 RX ADMIN — HYDRALAZINE HYDROCHLORIDE 75 MG: 50 TABLET, FILM COATED ORAL at 15:09

## 2023-04-18 RX ADMIN — POTASSIUM CHLORIDE 40 MEQ: 750 TABLET, EXTENDED RELEASE ORAL at 10:17

## 2023-04-18 RX ADMIN — ANASTROZOLE 1 MG: 1 TABLET ORAL at 20:12

## 2023-04-18 RX ADMIN — Medication 1 CAPSULE: at 10:01

## 2023-04-18 RX ADMIN — Medication 1 MG: at 10:00

## 2023-04-18 RX ADMIN — POTASSIUM PHOSPHATE, MONOBASIC POTASSIUM PHOSPHATE, DIBASIC 10 MMOL: 224; 236 INJECTION, SOLUTION, CONCENTRATE INTRAVENOUS at 20:09

## 2023-04-18 RX ADMIN — ONDANSETRON 4 MG: 2 INJECTION INTRAMUSCULAR; INTRAVENOUS at 17:26

## 2023-04-18 RX ADMIN — SODIUM CHLORIDE 75 ML/HR: 9 INJECTION, SOLUTION INTRAVENOUS at 17:28

## 2023-04-18 RX ADMIN — Medication 1 TABLET: at 10:00

## 2023-04-18 RX ADMIN — Medication 1000 UNITS: at 10:00

## 2023-04-18 RX ADMIN — Medication 10 ML: at 20:13

## 2023-04-18 RX ADMIN — FLUTICASONE PROPIONATE 1 SPRAY: 50 SPRAY, METERED NASAL at 10:02

## 2023-04-18 RX ADMIN — SODIUM CHLORIDE 100 ML/HR: 9 INJECTION, SOLUTION INTRAVENOUS at 06:30

## 2023-04-18 RX ADMIN — CALCIUM CARBONATE-VITAMIN D TAB 500 MG-200 UNIT 1 TABLET: 500-200 TAB at 10:01

## 2023-04-18 RX ADMIN — CARVEDILOL 12.5 MG: 12.5 TABLET, FILM COATED ORAL at 22:58

## 2023-04-18 RX ADMIN — Medication 1000 MCG: at 10:01

## 2023-04-18 RX ADMIN — CARVEDILOL 12.5 MG: 12.5 TABLET, FILM COATED ORAL at 10:01

## 2023-04-18 NOTE — CONSULTS
[facility] Consult Note      Patient Name: Baltazar MELO  : 1950  MRN: [id number]  Primary Care Physician:  Shameka Helms MD  Referring Physician: Alpesh Patel MD  Date of admission: 2023    Subjective     Reason for Consult:  CKD4    HPI:   Baltazar MELO is a 73 y.o. female with CKD4 (baseline SCR 1.8-2.0) followed by Dr. Best Nichols of our group, admitted yesterday for further evaluation of cough, congestion, weakness, and diarrhea for the preceding 10 days.  Found to have COVID-19 infection, though not requiring any supplemental O2.  Notably, her  had had similar symptoms prior to the onset of hers.  SCR on arrival 2.3, though value 1.8 today.    · She has had no further diarrhea  · No urinary complaints  · No obvious fever, but had chills in the days preceding admission  · Breathing is comfortable; denies shortness of breath on room air  · No orthopnea  · Stable lower extremity swelling; she believes her weight has been roughly the same for the past several months  · Appetite remains poor, but no N/V    Review of Systems:   14 point review of systems is otherwise negative except for mentioned above on HPI    Personal History     Past Medical History:   Diagnosis Date   • A-fib    • Arthritis    • Cancer    • Creatinine elevation    • Diabetes mellitus    • Elevated cholesterol    • Health care maintenance    • Hyperlipidemia    • Hypertension    • Obesity    • Orthostatic dizziness 2023   • PVC (premature ventricular contraction)        Past Surgical History:   Procedure Laterality Date   • BACK SURGERY     • BREAST CYST EXCISION Left    • BREAST LUMPECTOMY Right    • BREAST SURGERY Bilateral     Reduction   • CHOLECYSTECTOMY     • HYSTERECTOMY     • LUMBAR DISCECTOMY Left 2019    Procedure: Left Lumbar Three to Lumbar Four and Lumbar Four to Lumbar Five Decompression;  Surgeon: Abhijit Robertson MD;  Location: [facility] MAIN OR;  Service:  Neurosurgery       Family History: family history includes Cancer in her mother; Heart disease in her father; No Known Problems in her maternal grandfather, maternal grandmother, paternal grandfather, and paternal grandmother.    Social History:  reports that she quit smoking about 51 years ago. Her smoking use included cigarettes. She has a 0.13 pack-year smoking history. She has never used smokeless tobacco. She reports that she does not drink alcohol and does not use drugs.    Home Medications:  Prior to Admission medications    Medication Sig Start Date End Date Taking? Authorizing Provider   ACCU-CHEK LEE PLUS test strip USE 1 STRIP D AS INSTRUCTED 6/26/19  Yes Tania Snell MD   acetaminophen (TYLENOL) 325 MG tablet Take 3 tablets by mouth. 12/1/17  Yes Tania Snell MD   anastrozole (ARIMIDEX) 1 MG tablet   1 Tab, Oral, Daily, # 90 Tab, 2 Refill(s), Pharmacy: [id number], cm, 09/14/22 12:05:00 EDT, CLINICALHEIGHT, 99.6, kg, 09/14/22 12:05:00 EDT, CLINICALWEIGHT, 165.1 10/28/22  Yes Tania Snell MD   aspirin 81 MG chewable tablet Chew 1 tablet. Pt takes three times a week   Yes Tania Snell MD   atorvastatin (LIPITOR) 10 MG tablet Take 1 tablet by mouth Daily. 1/21/13  Yes Tania Snell MD   B-DAVID UF III MINI PEN NEEDLES 31G X 5 MM misc INJECTING QID 9/19/19  Yes Tania Snell MD   BIOTIN PO Take 1 tablet by mouth Daily.   Yes Tania Snell MD   calcium carbonate (OS-MAYCOL) 600 MG tablet Take 1 tablet by mouth 2 (Two) Times a Day.   Yes Tania Snell MD   carvedilol (COREG) 12.5 MG tablet TAKE 1 TABLET BY MOUTH TWICE DAILY  Patient taking differently: 2 tablets 2 (Two) Times a Day With Meals. 6/14/21  Yes Harjit Farah MD   Cholecalciferol (Vitamin D3) 125 MCG (5000 UT) tablet dispersible Take 5,000 Units by mouth Daily.   Yes Tania Snell MD   clindamycin (CLEOCIN) 300 MG capsule TAKE 2 CAPSULES BY MOUTH 1 HOUR BEFORE  DENTAL PROCEDURE 1/28/19  Yes Tania Snell MD   Ferrous Fumarate (IRON) 18 MG tablet controlled-release Take  by mouth 3 (Three) Times a Day.   Yes Tania Snell MD   fluticasone (FLONASE) 50 MCG/ACT nasal spray 1 spray into the nostril(s) as directed by provider Daily. 1/22/13  Yes Tania Snell MD   Folic Acid 5 MG capsule Take  by mouth Every Other Day.   Yes Tania Snell MD   glucose blood test strip 1 each by Other route Daily. 4/6/19  Yes Tania Snell MD   hydrALAZINE (APRESOLINE) 50 MG tablet Take 1.5 tablets by mouth 3 (Three) Times a Day. 1/27/23  Yes Gely Valdes APRN   Lactobacillus Rhamnosus, GG, (PROBIOTIC COLIC) liquid Take  by mouth.   Yes Tania Snell MD   losartan (COZAAR) 50 MG tablet Take 1 tablet by mouth Daily.   Yes Tania Snell MD   Multiple Vitamins-Minerals (MULTIVITAMIN PO) Take 1 tablet by mouth Daily. 1/21/13  Yes Tania Snell MD   ONETOUCH DELICA LANCETS 33G misc USE AS DIRECTED TO CHECK BLOOD SUGAR ONCE DAILY 2/21/17  Yes Tania Snell MD   pioglitazone (ACTOS) 15 MG tablet TAKE 1 TABLET BY MOUTH EVERY DAY 5/16/19  Yes Tania Snell MD   polyethylene glycol (MIRALAX) pack packet Take 17 g by mouth Daily. 8/27/19  Yes Ilir Jorge MD   potassium chloride 10 MEQ CR tablet TAKE 1 TABLET BY MOUTH DAILY 9/6/22  Yes Harjit Farah MD   sennosides-docusate sodium (SENOKOT-S) 8.6-50 MG tablet Take 2 tablets by mouth Every Night. 8/27/19  Yes Ilir Jorge MD   traMADol (ULTRAM) 50 MG tablet Take 1 tablet by mouth Every 6 (Six) Hours As Needed for Moderate Pain.   Yes Tania Snell MD   vitamin B-12 (CYANOCOBALAMIN) 1000 MCG tablet Take 1 tablet by mouth Daily.   Yes Tania Snell MD   Flaxseed, Linseed, (FLAX SEEDS) powder Take  by mouth.    Tania Snell MD   HYDROcodone-acetaminophen (NORCO) 5-325 MG per tablet Take 1 tablet by mouth Every 4 (Four) Hours As  Needed for Moderate Pain . 8/27/19   Ilir Jorge MD       Allergies:  Allergies   Allergen Reactions   • Adhesive Tape Dermatitis and Other (See Comments)     Blisters   • Cefaclor Dizziness   • Ibuprofen Other (See Comments)     Kidney dysfunction    • Penicillins Dizziness   • Levofloxacin Other (See Comments)   • Ascorbic Acid & Derivatives Other (See Comments)     Epistaxis   • Latex Rash       Objective     Vitals:   Temp:  [96.7 °F (35.9 °C)-98.4 °F (36.9 °C)] 97.8 °F (36.6 °C)  Heart Rate:  [67-91] 72  Resp:  [18] 18  BP: (120-173)/(54-91) 126/67    Intake/Output Summary (Last 24 hours) at 4/18/2023 1449  Last data filed at 4/18/2023 1006  Gross per 24 hour   Intake 990 ml   Output --   Net 990 ml       Physical Exam:   Constitutional: Awake, alert, NAD  HEENT: Sclera anicteric, no conjunctival injection  Neck: Supple, no carotid bruit, trachea at midline, no JVD  Respiratory: Fairly clear bilaterally, nonlabored on RA  Cardiovascular: Irregularly irregular, not tachycardic, no rub  Gastrointestinal: BS +, soft, nontender and nondistended  : No palpable bladder  Musculoskeletal: +1-2 edema, no clubbing or cyanosis  Psychiatric: Appropriate affect and mood, cooperative  Neurologic:  moving all extremities, normal speech; no asterixis  Skin: Warm and dry       Scheduled Meds:     anastrozole, 1 mg, Oral, Nightly  aspirin, 81 mg, Oral, Take As Directed  atorvastatin, 10 mg, Oral, Daily  calcium 500 mg vitamin D 5 mcg (200 UT), 1 tablet, Oral, Daily  carvedilol, 12.5 mg, Oral, BID  cholecalciferol, 1,000 Units, Oral, Daily  fluticasone, 1 spray, Nasal, Daily  folic acid, 1 mg, Oral, Daily  hydrALAZINE, 75 mg, Oral, TID  insulin lispro, 0-9 Units, Subcutaneous, TID AC  lactobacillus acidophilus, 1 capsule, Oral, Daily  multivitamin, 1 tablet, Oral, Daily  sodium chloride, 10 mL, Intravenous, Q12H  vitamin B-12, 1,000 mcg, Oral, Daily      IV Meds:   sodium chloride, 75 mL/hr, Last Rate: 75 mL/hr  (04/18/23 1336)        Results Reviewed:   I have personally reviewed the results from the time of this admission to 4/18/2023 14:49 EDT     Lab Results   Component Value Date    GLUCOSE 80 04/18/2023    CALCIUM 7.2 (L) 04/18/2023     04/18/2023    K 3.4 (L) 04/18/2023    CO2 18.0 (L) 04/18/2023     (H) 04/18/2023    BUN 28 (H) 04/18/2023    CREATININE 1.83 (H) 04/18/2023    EGFRIFNONA 37 (L) 09/13/2019    BCR 15.3 04/18/2023    ANIONGAP 8.0 04/18/2023      Lab Results   Component Value Date    MG 2.3 04/17/2023    PHOS 2.2 (L) 04/18/2023    ALBUMIN 2.6 (L) 04/18/2023           Assessment / Plan     ASSESSMENT:  1.  JUAN on CKD4, with UOP not recorded, improving prerenal due to poor oral intake and modest hypotension while on losartan.  Intravascular volume likely low; low potassium and phosphorus given recent diarrhea and poor appetite; likely NAGMA.  Hypovolemic hyponatremia that resolved with saline.  No urine studies yet  2.  COVID-19 infection; saturating well on room air now  3.  Hypertension, controlled off losartan  4.  Leukopenia and anemia  5.  Atrial fibrillation  6.  Diabetes mellitus with renal complications    PLAN:  1.  Continue IVF while appetite poor  2.  Urine studies  3.  Potassium phosphate IV  4.  Reduce hydralazine dose, and add hold parameters, to avoid inadvertent hypotension    Thank you for involving us in the care of Baltazar VILLANUEVA KAMERON.  Please feel free to call with any questions.    Jean Puckett MD  04/18/23  14:49 EDT    [facility]  [phone]      Please note that portions of this note were completed with a voice recognition program.

## 2023-04-18 NOTE — PLAN OF CARE
Goal Outcome Evaluation:  Plan of Care Reviewed With: patient        Progress: improving  Outcome Evaluation: vss, ra, a fib, a/o x4. IV fluids. Neph consulted. Zofran x1.

## 2023-04-18 NOTE — PROGRESS NOTES
Name: Baltazar MELO ADMIT: 2023   : 1950  PCP: Shameka Helms MD    MRN: [id number] LOS: 0 days   AGE/SEX: 73 y.o. female  ROOM: [facility]     Subjective   Subjective   Feeling better. Ate small amount breakfast. Drinking adequate amount fluids. No further diarrhea. Afebrile. Productive cough of small amount phlegm. Denies chest pain, soa. Has been up to bathroom without dizziness/lightheadedness    Review of Systems   Constitutional: Negative for chills and fever.   HENT: Negative for congestion.    Respiratory: Positive for cough. Negative for shortness of breath.    Cardiovascular: Negative for chest pain.   Gastrointestinal: Negative for diarrhea, nausea and vomiting.   Genitourinary: Negative for dysuria.   Musculoskeletal: Negative for arthralgias.   Skin: Negative for rash.   Neurological: Negative for dizziness and light-headedness.   Psychiatric/Behavioral: Negative for sleep disturbance.        Objective   Objective   Vital Signs  Temp:  [96.7 °F (35.9 °C)-98.4 °F (36.9 °C)] 98.4 °F (36.9 °C)  Heart Rate:  [67-91] 72  Resp:  [18] 18  BP: (120-174)/(54-97) 120/54  SpO2:  [98 %-100 %] 100 %  on   ;   Device (Oxygen Therapy): room air  Body mass index is 37.42 kg/m².  Physical Exam  Vitals and nursing note reviewed.   Constitutional:       General: She is not in acute distress.     Appearance: She is obese. She is ill-appearing. She is not toxic-appearing.   HENT:      Head: Normocephalic.      Mouth/Throat:      Mouth: Mucous membranes are moist.   Eyes:      Conjunctiva/sclera: Conjunctivae normal.   Cardiovascular:      Rate and Rhythm: Normal rate and regular rhythm.      Heart sounds: Murmur heard.   Pulmonary:      Effort: Pulmonary effort is normal. No respiratory distress.      Breath sounds: No wheezing or rales.      Comments: On room air  Abdominal:      General: Bowel sounds are normal.      Palpations: Abdomen is soft.   Musculoskeletal:      Cervical back: Neck supple.       Right lower leg: No edema.      Left lower leg: No edema.   Skin:     General: Skin is warm and dry.   Neurological:      Mental Status: She is alert and oriented to person, place, and time.   Psychiatric:         Mood and Affect: Mood normal.         Behavior: Behavior normal.       Results Review     I reviewed the patient's new clinical results.  Results from last 7 days   Lab Units 04/18/23  0344 04/17/23  1255   WBC 10*3/mm3 2.23* 2.41*   HEMOGLOBIN g/dL 8.9* 11.2*   PLATELETS 10*3/mm3 196 233     Results from last 7 days   Lab Units 04/18/23  0344 04/17/23  1255   SODIUM mmol/L 137 128*   POTASSIUM mmol/L 3.4* 4.3   CHLORIDE mmol/L 111* 97*   CO2 mmol/L 18.0* 20.0*   BUN mg/dL 28* 35*   CREATININE mg/dL 1.83* 2.33*   GLUCOSE mg/dL 80 139*   EGFR mL/min/1.73 29.2* 21.9*     Results from last 7 days   Lab Units 04/18/23  0344 04/17/23  1255   ALBUMIN g/dL 2.6* 3.5   BILIRUBIN mg/dL 0.3 0.5   ALK PHOS U/L 105 148*   AST (SGOT) U/L 33* 49*   ALT (SGPT) U/L 16 27     Results from last 7 days   Lab Units 04/18/23  0344 04/17/23  1255   CALCIUM mg/dL 7.2* 8.5*   ALBUMIN g/dL 2.6* 3.5   MAGNESIUM mg/dL  --  2.3       Hemoglobin A1C   Date/Time Value Ref Range Status   04/18/2023 0344 6.20 (H) 4.80 - 5.60 % Final     Glucose   Date/Time Value Ref Range Status   04/18/2023 1059 119 70 - 130 mg/dL Final     Comment:     Meter: [id number] : [id number] Evanchristophercookie Arie NA   04/18/2023 0615 91 70 - 130 mg/dL Final     Comment:     Meter: [id number] : [id number] Muhammad Tia NA   04/17/2023 2048 155 (H) 70 - 130 mg/dL Final     Comment:     Meter: [id number] : [id number] Muhammad Tia NA   04/17/2023 1615 128 70 - 130 mg/dL Final     Comment:     Meter: [id number] : [id number] Azeb Washington RN       XR Chest 1 View    Result Date: 4/17/2023  No focal pulmonary consolidation. Cardiomegaly. Follow-up as clinical indications persist.  This report was finalized on 4/17/2023 3:10 PM by Dr. Jorge Vidal M.D.      I have  personally reviewed all medications:  Scheduled Medications  anastrozole, 1 mg, Oral, Nightly  aspirin, 81 mg, Oral, Take As Directed  atorvastatin, 10 mg, Oral, Daily  calcium 500 mg vitamin D 5 mcg (200 UT), 1 tablet, Oral, Daily  carvedilol, 12.5 mg, Oral, BID  cholecalciferol, 1,000 Units, Oral, Daily  fluticasone, 1 spray, Nasal, Daily  folic acid, 1 mg, Oral, Daily  hydrALAZINE, 75 mg, Oral, TID  insulin lispro, 0-9 Units, Subcutaneous, TID AC  lactobacillus acidophilus, 1 capsule, Oral, Daily  multivitamin, 1 tablet, Oral, Daily  sodium chloride, 10 mL, Intravenous, Q12H  vitamin B-12, 1,000 mcg, Oral, Daily    Infusions  sodium chloride, 75 mL/hr, Last Rate: 100 mL/hr (04/18/23 1106)    Diet  Diet: Cardiac Diets, Diabetic Diets, Renal Diets; Healthy Heart (2-3 Na+); Consistent Carbohydrate; Low Sodium (2-3g), Low Potassium, Low Phosphorus; Texture: Regular Texture (IDDSI 7); Fluid Consistency: Thin (IDDSI 0)    I have personally reviewed:  [x]  Laboratory   [x]  Microbiology   [x]  Radiology   [x]  EKG/Telemetry  [x]  Cardiology/Vascular   [x]  Pathology    [x]  Records       Assessment/Plan     Active Hospital Problems    Diagnosis  POA   • **Acute on chronic renal insufficiency [N28.9, N18.9]  Yes   • Anemia, chronic disease [D63.8]  Yes   • Dehydration [E86.0]  Yes   • Hyponatremia [E87.1]  Yes   • COVID-19 virus infection [U07.1]  Yes   • Diarrhea [R19.7]  Yes   • A-fib [I48.91]  Yes   • Diabetes mellitus [E11.9]  Yes   • Hypertension [I10]  Yes      Resolved Hospital Problems    Diagnosis Date Resolved POA   • Orthostatic dizziness [R42] 04/18/2023 Yes       73 y.o. female admitted with Acute on chronic renal insufficiency.    JUAN/CKD/Dehydration:  -Cr improving w/IVF's; will continue at lower rate. Followed by Dr. Nichols, will consult to follow. Cr likely close to baseline. ARB held for now    Covid 19 virus/Diarrhea:  -Symptoms improving. No evidence of infiltrate on imaging. Afebrile. No hypoxia.  Symptoms present > 7 days, not candidate for remdesivir    Hyponatremia/Hypokalemia:  -Na now normalized. K 3.4, replete x 1. Repeat in a.m.    Afib:  -HR controlled, on carvedilol. Denies chest pain, CHF symptoms. Echo 2019. Noted elevated troponin, likely due to CKD. EKG without changes. Monitor on telemetry. Not on AC due to chronic anemia    HTN:  -BP acceptable acutely. ARB held as above. Continue hydralazine    DM:  -Monitor BG trends. Oral hypoglycemic held. Correctional scale insulin. A1C 6.20%    Anemia:  -Hgb 8.9 from 11.2, likely dilutional from IVF's. Repeat in a.m.      · SCDs for DVT prophylaxis.  · Full code.  · Discussed with patient and nursing staff.  · Anticipate discharge home with family tomorrow.      CAROLA Keene  [facility]  04/18/23  12:27 EDT

## 2023-04-19 VITALS
OXYGEN SATURATION: 100 % | WEIGHT: 218 LBS | HEIGHT: 64 IN | DIASTOLIC BLOOD PRESSURE: 62 MMHG | HEART RATE: 59 BPM | SYSTOLIC BLOOD PRESSURE: 126 MMHG | TEMPERATURE: 98 F | RESPIRATION RATE: 18 BRPM | BODY MASS INDEX: 37.22 KG/M2

## 2023-04-19 PROBLEM — N18.30 ACUTE RENAL FAILURE SUPERIMPOSED ON STAGE 3 CHRONIC KIDNEY DISEASE: Status: ACTIVE | Noted: 2023-04-17

## 2023-04-19 PROBLEM — N17.9 ACUTE RENAL FAILURE SUPERIMPOSED ON STAGE 3 CHRONIC KIDNEY DISEASE: Status: ACTIVE | Noted: 2023-04-17

## 2023-04-19 LAB
ALBUMIN SERPL-MCNC: 3 G/DL (ref 3.5–5.2)
ANION GAP SERPL CALCULATED.3IONS-SCNC: 7 MMOL/L (ref 5–15)
BUN SERPL-MCNC: 30 MG/DL (ref 8–23)
BUN/CREAT SERPL: 15.5 (ref 7–25)
CALCIUM SPEC-SCNC: 8.1 MG/DL (ref 8.6–10.5)
CHLORIDE SERPL-SCNC: 109 MMOL/L (ref 98–107)
CO2 SERPL-SCNC: 17 MMOL/L (ref 22–29)
CREAT SERPL-MCNC: 1.94 MG/DL (ref 0.57–1)
DEPRECATED RDW RBC AUTO: 40 FL (ref 37–54)
EGFRCR SERPLBLD CKD-EPI 2021: 27.2 ML/MIN/1.73
ERYTHROCYTE [DISTWIDTH] IN BLOOD BY AUTOMATED COUNT: 12.7 % (ref 12.3–15.4)
GLUCOSE BLDC GLUCOMTR-MCNC: 106 MG/DL (ref 70–130)
GLUCOSE BLDC GLUCOMTR-MCNC: 82 MG/DL (ref 70–130)
GLUCOSE SERPL-MCNC: 86 MG/DL (ref 65–99)
HCT VFR BLD AUTO: 32.4 % (ref 34–46.6)
HGB BLD-MCNC: 9.9 G/DL (ref 12–15.9)
MCH RBC QN AUTO: 26.6 PG (ref 26.6–33)
MCHC RBC AUTO-ENTMCNC: 30.6 G/DL (ref 31.5–35.7)
MCV RBC AUTO: 87.1 FL (ref 79–97)
PHOSPHATE SERPL-MCNC: 3.2 MG/DL (ref 2.5–4.5)
PLATELET # BLD AUTO: 222 10*3/MM3 (ref 140–450)
PMV BLD AUTO: 10.1 FL (ref 6–12)
POTASSIUM SERPL-SCNC: 4.5 MMOL/L (ref 3.5–5.2)
RBC # BLD AUTO: 3.72 10*6/MM3 (ref 3.77–5.28)
SODIUM SERPL-SCNC: 133 MMOL/L (ref 136–145)
WBC NRBC COR # BLD: 2.79 10*3/MM3 (ref 3.4–10.8)

## 2023-04-19 PROCEDURE — G0378 HOSPITAL OBSERVATION PER HR: HCPCS

## 2023-04-19 PROCEDURE — 82962 GLUCOSE BLOOD TEST: CPT

## 2023-04-19 PROCEDURE — 80069 RENAL FUNCTION PANEL: CPT | Performed by: NURSE PRACTITIONER

## 2023-04-19 PROCEDURE — 85027 COMPLETE CBC AUTOMATED: CPT | Performed by: NURSE PRACTITIONER

## 2023-04-19 RX ORDER — HYDRALAZINE HYDROCHLORIDE 50 MG/1
50 TABLET, FILM COATED ORAL 3 TIMES DAILY
Qty: 420 TABLET | Refills: 0
Start: 2023-04-19 | End: 2023-04-27

## 2023-04-19 RX ADMIN — Medication 1 CAPSULE: at 08:39

## 2023-04-19 RX ADMIN — Medication 1 TABLET: at 08:39

## 2023-04-19 RX ADMIN — CALCIUM CARBONATE-VITAMIN D TAB 500 MG-200 UNIT 1 TABLET: 500-200 TAB at 08:39

## 2023-04-19 RX ADMIN — Medication 1000 UNITS: at 08:39

## 2023-04-19 RX ADMIN — HYDRALAZINE HYDROCHLORIDE 50 MG: 50 TABLET, FILM COATED ORAL at 08:38

## 2023-04-19 RX ADMIN — Medication 1000 MCG: at 08:39

## 2023-04-19 RX ADMIN — CARVEDILOL 12.5 MG: 12.5 TABLET, FILM COATED ORAL at 08:39

## 2023-04-19 RX ADMIN — Medication 10 ML: at 08:39

## 2023-04-19 RX ADMIN — Medication 1 MG: at 12:11

## 2023-04-19 NOTE — CASE MANAGEMENT/SOCIAL WORK
Case Management Discharge Note      Final Note: Home, no additional CCP needs. Regis RN/CCP         Selected Continued Care - Admitted Since 4/17/2023     Destination    No services have been selected for the patient.              Durable Medical Equipment    No services have been selected for the patient.              Dialysis/Infusion    No services have been selected for the patient.              Home Medical Care    No services have been selected for the patient.              Therapy    No services have been selected for the patient.              Community Resources    No services have been selected for the patient.              Community & DME    No services have been selected for the patient.                       Final Discharge Disposition Code: 01 - home or self-care

## 2023-04-19 NOTE — NURSING NOTE
Pt discharged and IV removed and discharge instructions given to pt and she had no further questions or concerns

## 2023-04-19 NOTE — DISCHARGE SUMMARY
[facility]    Date of Admission: 4/17/2023  Date of Discharge:  4/19/2023    PCP: Shameka Helms MD    Discharge Diagnosis:   Active Hospital Problems    Diagnosis  POA   • **Acute renal failure superimposed on stage 3 chronic kidney disease [N17.9, N18.30]  Yes   • Anemia, chronic disease [D63.8]  Yes   • Dehydration [E86.0]  Yes   • Hyponatremia [E87.1]  Yes   • COVID-19 virus infection [U07.1]  Yes   • Diarrhea [R19.7]  Yes   • A-fib [I48.91]  Yes   • Diabetes mellitus [E11.9]  Yes   • Hypertension [I10]  Yes      Resolved Hospital Problems    Diagnosis Date Resolved POA   • Orthostatic dizziness [R42] 04/18/2023 Yes     Procedures Performed  none     Consults     Date and Time Order Name Status Description    4/18/2023 12:14 PM Inpatient Nephrology Consult      4/17/2023  2:11 PM [facility] (on-call MD unless specified) Details          Hospital Course  Please see history and physical for details. Patient is a 73 y.o. female that presented to the hospital with complaints of cough, congestion, weakness, fatigue and diarrhea x 10 days. She was found to be COVID19 positive int he ED and admitted for further care. She has known CKD4 followed by Dr. Nichols.    The patient was admitted to the hospital and started on IVF for supportive care. Her ARB was held given her GI losses. Nephrology was consulted and agreed with fluids as well as holding ARB. Her hydralazine was reduced for now as well to avoid hypotension. She has been able to tolerated a diet with minimal nausea and has only had a couple bouts of diarrhea. She feels she is drinking adequately and has been saturating well on RA. She was not a candidate for remdesivir d/t her onset of symptoms. She has been hemodynamically stable and afebrile. If nephrology signs off today, she will be discharged home assuming she does okay with PT. She should see her PCP in 1 week and nephrology in 1-2 weeks or as advised. She  denies any chest pain, dyspnea, cough, fever, chills or vomiting.    I discussed the patient's findings and my recommendations with patient and nursing staff.    Condition on Discharge: Improved.     Temp:  [97.8 °F (36.6 °C)-98.6 °F (37 °C)] 98 °F (36.7 °C)  Heart Rate:  [62-77] 69  Resp:  [18] 18  BP: (114-139)/(53-73) 139/66  Body mass index is 37.42 kg/m².    Physical Exam  Vitals and nursing note reviewed.   Constitutional:       General: She is not in acute distress.     Appearance: She is obese. She is ill-appearing. She is not toxic-appearing.   Cardiovascular:      Rate and Rhythm: Normal rate and regular rhythm.      Heart sounds: Murmur heard.   Pulmonary:      Effort: Pulmonary effort is normal. No respiratory distress.      Breath sounds: No wheezing or rales.      Comments: On room air  Abdominal:      General: Bowel sounds are normal.      Palpations: Abdomen is soft.   Musculoskeletal:      Cervical back: Neck supple.      Right lower leg: No edema.      Left lower leg: No edema.   Skin:     General: Skin is warm and dry.   Neurological:      Mental Status: She is alert and oriented to person, place, and time.   Psychiatric:         Mood and Affect: Mood normal.         Behavior: Behavior normal.        Discharge Medications      Changes to Medications      Instructions Start Date   carvedilol 12.5 MG tablet  Commonly known as: COREG  What changed:   · how much to take  · how to take this  · when to take this   TAKE 1 TABLET BY MOUTH TWICE DAILY      hydrALAZINE 50 MG tablet  Commonly known as: APRESOLINE  What changed: how much to take   50 mg, Oral, 3 Times Daily         Continue These Medications      Instructions Start Date   acetaminophen 325 MG tablet  Commonly known as: TYLENOL   975 mg, Oral      anastrozole 1 MG tablet  Commonly known as: ARIMIDEX   1 Tab, Oral, Daily, # 90 Tab, 2 Refill(s), Pharmacy: [id number], cm, 09/14/22 12:05:00 EDT, CLINICALHEDAMIAN, 99.6, kg, 09/14/22  12:05:00 EDT, CLINICALWEIGHT, 165.1      aspirin 81 MG chewable tablet   81 mg, Oral, Pt takes three times a week      atorvastatin 10 MG tablet  Commonly known as: LIPITOR   1 tablet, Oral, Daily      B-D UF III MINI PEN NEEDLES 31G X 5 MM misc  Generic drug: Insulin Pen Needle   INJECTING QID      BIOTIN PO   1 tablet, Oral, Daily      calcium carbonate 600 MG tablet  Commonly known as: OS-MAYCOL   600 mg, Oral, 2 Times Daily      fluticasone 50 MCG/ACT nasal spray  Commonly known as: FLONASE   1 spray, Nasal, Daily      Folic Acid 5 MG capsule   Oral, Every Other Day      glucose blood test strip   1 strip, Other, Daily      Accu-Chek Ileana Plus test strip  Generic drug: glucose blood   USE 1 STRIP D AS INSTRUCTED      Iron 18 MG tablet controlled-release   Oral, 3 Times Daily      multivitamin tablet tablet  Commonly known as: THERAGRAN   1 tablet, Oral, Daily      OneTouch Delica Lancets 33G misc   USE AS DIRECTED TO CHECK BLOOD SUGAR ONCE DAILY      pioglitazone 15 MG tablet  Commonly known as: ACTOS   TAKE 1 TABLET BY MOUTH EVERY DAY      polyethylene glycol pack packet  Commonly known as: MIRALAX   17 g, Oral, Daily      potassium chloride 10 MEQ CR tablet   10 mEq, Oral, Daily      Probiotic Colic liquid   Oral      sennosides-docusate 8.6-50 MG per tablet  Commonly known as: PERICOLACE   2 tablets, Oral, Nightly      traMADol 50 MG tablet  Commonly known as: ULTRAM   50 mg, Oral, Every 6 Hours PRN      vitamin B-12 1000 MCG tablet  Commonly known as: CYANOCOBALAMIN   1,000 mcg, Oral, Daily      Vitamin D3 125 MCG (5000 UT) tablet dispersible   5,000 Units, Oral, Daily         Stop These Medications    clindamycin 300 MG capsule  Commonly known as: CLEOCIN     Flax Seeds powder     HYDROcodone-acetaminophen 5-325 MG per tablet  Commonly known as: NORCO     losartan 50 MG tablet  Commonly known as: COZAAR           Diet Instructions     Diet: Cardiac Diets, Diabetic Diets, Renal Diets; Healthy Heart (2-3 Na+);  Regular Texture (IDDSI 7); Thin (IDDSI 0); Consistent Carbohydrate; Low Sodium (2-3g), Low Potassium, Low Phosphorus      Discharge Diet:  Cardiac Diets  Diabetic Diets  Renal Diets       Cardiac Diet: Healthy Heart (2-3 Na+)    Texture: Regular Texture (IDDSI 7)    Fluid Consistency: Thin (IDDSI 0)    Diabetic Diet: Consistent Carbohydrate    Renal Diet:  Low Sodium (2-3g)  Low Potassium  Low Phosphorus            Activity Instructions     Activity as Tolerated           Additional Instructions for the Follow-ups that You Need to Schedule     Discharge Follow-up with PCP   As directed       Currently Documented PCP:    Shameka Helms MD    PCP Phone Number:    [phone]     Follow Up Details: see in 1-2 weeks         Discharge Follow-up with Specified Provider: Dr. Nichols; 2 Weeks   As directed      To: Dr. Nichols    Follow Up: 2 Weeks            Follow-up Information     Shameka Helms MD .    Specialty: Internal Medicine  Why: see in 1-2 weeks  Contact information:  [address] [place], [place] [id number] [phone]                       Test Results Pending at Discharge     CAROLA Jang  04/19/23  12:35 EDT    Discharge time spent greater than 30 minutes.

## 2023-04-27 RX ORDER — HYDRALAZINE HYDROCHLORIDE 50 MG/1
TABLET, FILM COATED ORAL
Qty: 420 TABLET | Refills: 0 | Status: SHIPPED | OUTPATIENT
Start: 2023-04-27

## 2023-05-31 RX ORDER — POTASSIUM CHLORIDE 750 MG/1
10 TABLET, FILM COATED, EXTENDED RELEASE ORAL DAILY
Qty: 90 TABLET | Refills: 3 | Status: SHIPPED | OUTPATIENT
Start: 2023-05-31

## 2023-07-24 RX ORDER — HYDRALAZINE HYDROCHLORIDE 50 MG/1
TABLET, FILM COATED ORAL
Qty: 420 TABLET | Refills: 0 | Status: SHIPPED | OUTPATIENT
Start: 2023-07-24

## 2023-11-21 RX ORDER — HYDRALAZINE HYDROCHLORIDE 50 MG/1
TABLET, FILM COATED ORAL
Qty: 420 TABLET | Refills: 0 | Status: SHIPPED | OUTPATIENT
Start: 2023-11-21

## 2024-01-30 RX ORDER — HYDRALAZINE HYDROCHLORIDE 50 MG/1
TABLET, FILM COATED ORAL
Qty: 405 TABLET | Refills: 1 | Status: SHIPPED | OUTPATIENT
Start: 2024-01-30

## 2024-02-13 ENCOUNTER — OFFICE VISIT (OUTPATIENT)
Dept: CARDIOLOGY | Facility: CLINIC | Age: 74
End: 2024-02-13
Payer: MEDICARE

## 2024-02-13 VITALS
SYSTOLIC BLOOD PRESSURE: 172 MMHG | WEIGHT: 194.2 LBS | BODY MASS INDEX: 33.16 KG/M2 | DIASTOLIC BLOOD PRESSURE: 78 MMHG | HEIGHT: 64 IN | HEART RATE: 67 BPM

## 2024-02-13 DIAGNOSIS — I48.19 PERSISTENT ATRIAL FIBRILLATION: ICD-10-CM

## 2024-02-13 DIAGNOSIS — I10 PRIMARY HYPERTENSION: Primary | ICD-10-CM

## 2024-02-13 PROCEDURE — 3078F DIAST BP <80 MM HG: CPT | Performed by: INTERNAL MEDICINE

## 2024-02-13 PROCEDURE — 99214 OFFICE O/P EST MOD 30 MIN: CPT | Performed by: INTERNAL MEDICINE

## 2024-02-13 PROCEDURE — 3077F SYST BP >= 140 MM HG: CPT | Performed by: INTERNAL MEDICINE

## 2024-02-13 PROCEDURE — 93000 ELECTROCARDIOGRAM COMPLETE: CPT | Performed by: INTERNAL MEDICINE

## 2024-02-13 RX ORDER — LOSARTAN POTASSIUM 25 MG/1
25 TABLET ORAL DAILY
COMMUNITY

## 2024-02-13 RX ORDER — SODIUM BICARBONATE 650 MG/1
650 TABLET ORAL 2 TIMES DAILY
COMMUNITY

## 2024-02-13 RX ORDER — CARVEDILOL 25 MG/1
25 TABLET ORAL 2 TIMES DAILY WITH MEALS
COMMUNITY

## 2024-03-04 ENCOUNTER — APPOINTMENT (OUTPATIENT)
Dept: WOMENS IMAGING | Facility: HOSPITAL | Age: 74
End: 2024-03-04
Payer: MEDICARE

## 2024-03-04 PROCEDURE — 77063 BREAST TOMOSYNTHESIS BI: CPT | Performed by: RADIOLOGY

## 2024-03-04 PROCEDURE — 77067 SCR MAMMO BI INCL CAD: CPT | Performed by: RADIOLOGY

## 2024-05-15 RX ORDER — POTASSIUM CHLORIDE 750 MG/1
10 TABLET, FILM COATED, EXTENDED RELEASE ORAL DAILY
Qty: 90 TABLET | Refills: 3 | Status: SHIPPED | OUTPATIENT
Start: 2024-05-15

## 2024-07-08 ENCOUNTER — TREATMENT (OUTPATIENT)
Age: 74
End: 2024-07-08
Payer: MEDICARE

## 2024-07-08 DIAGNOSIS — R29.898 LEFT LEG WEAKNESS: ICD-10-CM

## 2024-07-08 DIAGNOSIS — Z74.09 DECREASED STRENGTH, ENDURANCE, AND MOBILITY: ICD-10-CM

## 2024-07-08 DIAGNOSIS — R68.89 DECREASED STRENGTH, ENDURANCE, AND MOBILITY: ICD-10-CM

## 2024-07-08 DIAGNOSIS — R53.1 DECREASED STRENGTH, ENDURANCE, AND MOBILITY: ICD-10-CM

## 2024-07-08 DIAGNOSIS — Z91.81 RISK FOR FALLS: ICD-10-CM

## 2024-07-08 DIAGNOSIS — M85.852 OSTEOPENIA OF LEFT HIP: Primary | ICD-10-CM

## 2024-07-08 NOTE — PROGRESS NOTES
[facility]              [address]                                                                                   [place] [address]                         Phone [phone]                   Fax [phone]        Physical Therapy Initial Evaluation and Plan of Care    Patient: Baltazar MELO   : 1950  Diagnosis/ICD-10 Code:  Osteopenia of left hip [M85.852]  Referring practitioner: Amy Boucher MD  Date of Initial Visit: 2024  Today's Date: 2024  Patient seen for 1 session         Visit Diagnoses:    ICD-10-CM ICD-9-CM   1. Osteopenia of left hip  M85.852 733.90   2. Left leg weakness  R29.898 729.89   3. Risk for falls  Z91.81 V15.88   4. Decreased strength, endurance, and mobility  R53.1 780.79    Z74.09 780.99    R68.89          Subjective Questionnaire: [place]:     Past Medical History:   Diagnosis Date    A-fib     Arthritis     Cancer     Creatinine elevation     Diabetes mellitus     Elevated cholesterol     Health care maintenance     Hyperlipidemia     Hypertension     Obesity     Orthostatic dizziness 2023    PVC (premature ventricular contraction)      DXA 9/15/21  FINDINGS:   Lumbar spine (L2-L4):        Bone mineral density:  1.467 g/cm2.        T score: 1.76.          Classification:  Normal bone mineralization.     Proximal left femoral neck:        Bone mineral density:  0.6399 g/cm2.        T score: -2.90.          Classification:  Osteoporosis.     Proximal right femoral neck:        Bone mineral density:  0.9813 g/cm2.        T score: -0.46.          Classification:  Normal bone mineralization.     IMPRESSION:   Osteoporosis.     Subjective Evaluation    History of Present Illness  Mechanism of injury: Dexa performed left hip and found osteoporosis.  Just recently seen by MD and asked for referral.  Not much pain. Had back surgery in 2019 left L3.4, L4,5 lumbar decompression MD wasn't sure if I would  walk again.  I had outpatient rehab at [facility].  Did not get to continue due to Covid.  Never learned how to use quad cane. Had AFO but I don't wear it anymore. Now occasional back pain.  Occasional left radiating symptoms. Still has numbness in the left hip. No giving way.    Subjective comment: Wants to learn how to walk with quad cane avoid fall. PLOF: shopping, crafting, traveling can't ride long hours.  ADL's, cooking now, have to be careful with cleaning.   Some balance issues.  Patient Occupation: Retired - [employer].  Lifted regulary for years.   Precautions and Work Restrictions: No restrictions, daily chemo pill.  Afib, pvcs, DM.Pain  Current pain ratin  At worst pain rating: 3  Location: left posterior hip and thigh at end of day.  Quality: numb.  Relieving factors: rest (using rollator)  Aggravating factors: stairs and ambulation (pm when I'm up on it, climbing stairs, ok going down)  Progression: improved    Social Support  Lives in: multiple-level home (7-8 carpet steps, quad level 8 steps, 5 steps, basement 8)  Lives with: spouse    Treatments  Previous treatment: physical therapy  Patient Goals  Patient goals for therapy: improved balance, increased strength and independence with ADLs/IADLs  Patient goal: learn to walk with cane, avoid falls           Objective          Observations     Additional Hip Observation Details  Healed scar lumbar.    Palpation     Additional Palpation Details       Tenderness     Left Hip   Tenderness in the greater trochanter.     Neurological Testing     Sensation     Hip   Left Hip   Intact: light touch    Right Hip   Intact: light touch    Additional Neurological Details  C/o numbness B toes, entire left foot but intact to light touch and proprioception    Active Range of Motion   Left Hip   Flexion: 90 degrees   Extension: 0 degrees   Abduction: 20 degrees   Adduction: 10 degrees     Additional Active Range of Motion Details  seated    Strength/Myotome Testing      Left Hip   Planes of Motion   Flexion: 4- (seated)  Abduction: 4  Adduction: 3+  External rotation: 4  Internal rotation: 3    Right Hip   Normal muscle strength    Additional Strength Details  Left hamstring 3+/5, gastroc 4-/5 MMT.  Tested seated    Tests     Left Hip   SLR: Negative.     Ambulation   Weight-Bearing Status   Weight-Bearing Status (Left): weight-bearing as tolerated   Assistive device used: rollator walker with seat    Ambulation: Level Surfaces     Additional Level Surfaces Ambulation Details  Independent with rollator, min assist with cane until fatigues, mod assist without.    Ambulation: Stairs   Ascend stairs: minimum assist  Pattern: non-reciprocal  Railings: one rail  Descend stairs: minimum assist  Pattern: non-reciprocal  Railings: one rail    Observational Gait   Gait: antalgic   Left step length within functional limits. Increased left stance time. Decreased walking speed and stride length.   Left foot contact pattern: heel to toe    Additional Observational Gait Details  ER right LE, Trendelenburg left and lateral lean over left stance.    Quality of Movement During Gait     Pelvis    Pelvis (Left): Positive Trendelenburg.     Functional Assessment   Squat No pain.     Comments  I with sit to stand.  Sit to stand test: 6 in 30 seconds.  Ambulate 100 feet with fatigue with quad cane, needing increased assist.     General Comments     Hip Comments            Access Code: [email or web address]  URL: [email or web address]  Date: 07/08/2024  Prepared by: Kelsi Kirkpatrick    Exercises  - Seated Heel Toe Raises  - 1 x daily - 7 x weekly - 1 sets - 10 reps  - Seated Hip Adduction Isometrics with Ball  - 1 x daily - 7 x weekly - 1 sets - 10 reps  - Seated Hip Abduction with Resistance  - 1 x daily - 7 x weekly - 1 sets - 10 reps  - Seated Hamstring Curl with Anchored Resistance  - 1 x daily - 7 x weekly - 1 sets - 10 reps    Patient Education  - Going Up and Down Stairs with Cane  - Walking with Quad  Cane  - Walking with Quad Cane - Step Through  Patient was educated on findings of evaluation, purpose of treatment, and goals for therapy.  Patient was educated on exercises/self treatment/pain relief techniques.Patient educated on anatomy, healing process, restrictions reviewed, posture, gait, purpose of therapy, pain control, and plan of care discussed.  All questions answered. Osteoporosis, will continue with education.    Assessment & Plan       Assessment  Impairments: abnormal gait, abnormal muscle firing, abnormal or restricted ROM, activity intolerance, impaired balance, impaired physical strength, pain with function and safety issue   Functional limitations: carrying objects, lifting, walking, pulling, pushing, moving in bed and stooping   Assessment details: Patient is a 74 year-old female referred to physical therapy for left hip strengthening with underlying dx of osteopenia.  She has a h/o severe left lumbar nerve compression s/p decompression in 2019 in which she underwent rehab but was cut short due to COVID.  She does not have significant pain but has weakness and is at risk for falling. She presents with an evolving clinical presentation.  She has multiple comorbidities and personal factors of ongoing oral chemo and weakness that may affect care.  Signs and symptoms are consistent with physical therapy diagnosis of left LE weakness, decreased balance, and increased falls risk. Patient is appropriate for skilled physical therapy in order to increase strength, improve balance, increase endurance, increase ease with daily mobility and maintain level of function.      Prognosis: good    Goals  Plan Goals: STG X 4weeks  1.  Patient will demonstrate increase left hip ROM in flexion and IR a total of 10 degrees.  2.  Patient will demonstrate increase in left hip strength >=1 grade.  3.  Ambulate with quad cane with min to CGA X 200 feet with no rest break for ambulation in the community.  4.  Ascend  stairs normally with <=2/10 pain.  LTG X 8 weeks  1.  Patient will demonstrate ability to ambulate in the community for store and shopping with tolerable symptoms up to 2 hours.  2.  Patient will demonstrate >=4/5 strength.  3.  Patient to perform walking short distances in her home without AD and safe balance.  4. Independent with stairs and quad cane to safely navigate quad level home      Plan  Therapy options: will be seen for skilled therapy services  Planned modality interventions: cryotherapy, ultrasound and thermotherapy (hydrocollator packs)  Planned therapy interventions: manual therapy, neuromuscular re-education, body mechanics training, functional ROM exercises, strengthening, stretching, ADL retraining, balance/weight-bearing training and postural training  Frequency: 2x week  Duration in weeks: 8  Treatment plan discussed with: patient        History # of Personal Factors and/or Comorbidities: HIGH (3+)  Examination of Body System(s): # of elements: LOW (1-2)  Clinical Presentation: EVOLVING  Clinical Decision Making: MODERATE      Timed:         Manual Therapy:    -     mins  69404;     Therapeutic Exercise:    15     mins  89902;     Neuromuscular Gabriella:    -    mins  94705;    Therapeutic Activity:     -     mins  17940;     Gait Trainin     mins  39369;     Ultrasound:     -     mins  54308;    Ionto                               -    mins   31649  Self Care                       10     mins   32533  Orthotic fit/train              -   mins  15136  Self Pay 15  -       mins   PTSPMIN1  Self Pay 30  -      mins  PTSPMIN2   Dry Needling Tri __-__ DNTRIAL    Un-Timed:  Electrical Stimulation:    -     mins  85835 (MC );  Dry Needling     -     mins self-pay  Traction     -     mins 95844  Low Eval  22   Mins  75000  Mod Eval     -     Mins  95769  High Eval                       -     Mins  12005        Timed Treatment:   38   mins   Total Treatment:     60   mins          PT: Kelsi SHARPE  EARNESTINE Kirkpatrick, CIDN     License Number: [id number]  Electronically signed by Kelsi Kirkpatrick PT, 07/08/24, 8:26 AM EDT    Certification Period: 7/8/2024 thru 10/5/2024  I certify that the therapy services are furnished while this patient is under my care.  The services outlined above are required by this patient, and will be reviewed every 90 days.         Physician Signature:__________________________________________________    PHYSICIAN: Amy Boucher MD      DATE:     Please sign and return via fax to [phone]. Thank you, [facility].

## 2024-07-10 ENCOUNTER — TREATMENT (OUTPATIENT)
Age: 74
End: 2024-07-10
Payer: MEDICARE

## 2024-07-10 DIAGNOSIS — M85.852 OSTEOPENIA OF LEFT HIP: Primary | ICD-10-CM

## 2024-07-10 DIAGNOSIS — R68.89 DECREASED STRENGTH, ENDURANCE, AND MOBILITY: ICD-10-CM

## 2024-07-10 DIAGNOSIS — R53.1 DECREASED STRENGTH, ENDURANCE, AND MOBILITY: ICD-10-CM

## 2024-07-10 DIAGNOSIS — Z74.09 DECREASED STRENGTH, ENDURANCE, AND MOBILITY: ICD-10-CM

## 2024-07-10 DIAGNOSIS — R29.898 LEFT LEG WEAKNESS: ICD-10-CM

## 2024-07-10 DIAGNOSIS — Z91.81 RISK FOR FALLS: ICD-10-CM

## 2024-07-10 NOTE — PROGRESS NOTES
Physical Therapy Daily Treatment Note    [facility]  [address]  [address]  [place] [address]     Patient: Baltazar VILLANUEVA KAMERON   : 1950  Referring practitioner: No ref. provider found  Date of Initial Visit: Type: THERAPY  Noted: 2024  Today's Date: 7/10/2024  Patient seen for 2 sessions         Baltazar MELO reports: some soreness in hips/muscles from last session exercises.        Subjective     Objective   -ambulates in/out of clinic with rollator with noted decreased step/stride length and foot clearance    See Exercise, Manual, and Modality Logs for complete treatment.   Exercise rationale/ pain free exercise performance  Anatomy and structure of affected musculature  Posture/Postural awareness with ambulation  Lumbar support -towel roll  Sleeping positions with pillows  Alternate exercise positions  Verbal/Tactile cues to ensure correct exercise performance/technique    Added:Nu Step, standing heel/toe raises, standing marches, SAQ, LAQ, Suipine /SEATED MARHCES    Gait training x 10 min with quad cane/rollator ambulating in/around clinic, navigating equipment/objects.  Instructed regarding proper sequencing.with quad cane as well as verbal/tactile cues for proper foot clearance and increased step/stride length      Updated HEP on [employer] and provided handouts for home performance/progression.       Assessment/Plan  Subjectively complaints of mm soreness in hips from previous sessions exercise regimen.  Able to progress/perform exercises for affected musculature without increased symptoms/discomfort, only early mm fatigue of isolated musculature.  Gait training improves with practice, education regarding sequencing and verbal/tactile cues to allow for improved step/stride length and foot clearance.  Should continue to improve with continued rehab efforts.          Progress strengthening /stabilization /functional activity as symptoms allow.             Timed:  Manual Therapy:         mins  04560;  Therapeutic Exercise:    24     mins  77530;     Neuromuscular Gabriella:        mins  76679;    Therapeutic Activity:     10     mins  16580;     Gait Training:      10     mins  22792;     Ultrasound:          mins  46205;    Self Care                    __12_      mins 87187    Untimed:  Electrical Stimulation:         mins  94969 ( );  Mechanical Traction:         mins  52477;     Timed Treatment:   56   mins   Total Treatment:    56    mins  Faisal Hernandez [facility]  Physical Therapist  Assistant  [id number]

## 2024-07-15 ENCOUNTER — TREATMENT (OUTPATIENT)
Age: 74
End: 2024-07-15
Payer: MEDICARE

## 2024-07-15 DIAGNOSIS — Z91.81 RISK FOR FALLS: ICD-10-CM

## 2024-07-15 DIAGNOSIS — M85.852 OSTEOPENIA OF LEFT HIP: Primary | ICD-10-CM

## 2024-07-15 DIAGNOSIS — R53.1 DECREASED STRENGTH, ENDURANCE, AND MOBILITY: ICD-10-CM

## 2024-07-15 DIAGNOSIS — R29.898 LEFT LEG WEAKNESS: ICD-10-CM

## 2024-07-15 DIAGNOSIS — R68.89 DECREASED STRENGTH, ENDURANCE, AND MOBILITY: ICD-10-CM

## 2024-07-15 DIAGNOSIS — Z74.09 DECREASED STRENGTH, ENDURANCE, AND MOBILITY: ICD-10-CM

## 2024-07-15 NOTE — PROGRESS NOTES
Physical Therapy Daily Treatment Note    [facility]  [address]  [address]  [place] [address]     Patient: Baltazar VILLANUEVA NELLYYanethMIROSLAVA   : 1950  Referring practitioner: No ref. provider found  Date of Initial Visit: Type: THERAPY  Noted: 2024  Today's Date: 7/15/2024  Patient seen for 3 sessions         Pushpaangely KAMERON reports: just a little muscle soreness in upper thighs after last session.  Noticed that my thigh numbness in the left was gone.     Was busy, busy, busy since I was last here and didn't get to do exercises over the weekend.        Subjective     Objective   -ambulates in/out of clinic with rollator with noted decreased step/stride length L LE with subsequent decreased step clearance bilaterally.  -use of hands to lift L LE up/down onto table or in/out of car.    See Exercise, Manual, and Modality Logs for complete treatment.     Gait training in clinic x 12 min(1 interval of 5 min, 1 interval of 4 min, 1 interval of 3 min) with quad cane in clinic/across gym floor while navigating objects/equipment.  Performs gait with 2 part gait sequencing with step to gait.    Added: standing weight shift and standing dynamic marches in // bars 2 x 10 each/each LE, sit to stands, LAQ    Assessment/Plan  Progressing well with current exercises in regards to recall, performance and progression.  Exhibits improved comfort/confidence with quad cane ambulation in clinic in regards to sequencing and steadiness but still exhibits capability for step to gait sequencing and continues to exhibit decreased step/stride length and foot clearance with ambulation.  Noted weakness with L LE quad and hip flexor with difficulty raising leg onto the bed, in/out of car.        Progress per Plan of Care and Progress strengthening /stabilization /functional activity  Add slr, bridge next session           Timed:  Manual Therapy:         mins  99188;  Therapeutic Exercise: 25        mins  08476;      Neuromuscular Gabriella:  8      mins  94703;    Therapeutic Activity:     8     mins  83809;     Gait Trainin       mins  26865;     Ultrasound:          mins  23040;    Self Care                    ___      mins 98846    Untimed:  Electrical Stimulation:         mins  79959 ( );  Mechanical Traction:         mins  32406;     Timed Treatment: 53     mins   Total Treatment:  53      mins  Faisal Hernandez, [facility]  Physical Therapist  Assistant  [id number]

## 2024-07-17 ENCOUNTER — TREATMENT (OUTPATIENT)
Age: 74
End: 2024-07-17
Payer: MEDICARE

## 2024-07-17 DIAGNOSIS — Z74.09 DECREASED STRENGTH, ENDURANCE, AND MOBILITY: ICD-10-CM

## 2024-07-17 DIAGNOSIS — M85.852 OSTEOPENIA OF LEFT HIP: Primary | ICD-10-CM

## 2024-07-17 DIAGNOSIS — R68.89 DECREASED STRENGTH, ENDURANCE, AND MOBILITY: ICD-10-CM

## 2024-07-17 DIAGNOSIS — Z91.81 RISK FOR FALLS: ICD-10-CM

## 2024-07-17 DIAGNOSIS — R29.898 LEFT LEG WEAKNESS: ICD-10-CM

## 2024-07-17 DIAGNOSIS — R53.1 DECREASED STRENGTH, ENDURANCE, AND MOBILITY: ICD-10-CM

## 2024-07-17 NOTE — PROGRESS NOTES
Physical Therapy Daily Treatment Note    [facility] PT - [facility]  [address]  [address]  [place] [address]     Patient: Baltazar VILLANUEVA KAMERON   : 1950  Referring practitioner: No ref. provider found  Date of Initial Visit: Type: THERAPY  Noted: 2024  Today's Date: 2024  Patient seen for 4 sessions         Sharifaurelio KAMERON reports: working on my walking and trying to be up longer on my feet to build my strength up.        Subjective     Objective   -ambulates in/out of clinic with rollator.  Ambulates in/around clinic gym space with quad cane.    See Exercise, Manual, and Modality Logs for complete treatment.   Added:  sidelying L LE clamshells, sidelying L hip abd SLR, supine L LE SLR, supine ppt with LE marches    Gait training in clinic x 10 min(2 intervals of 5 min) with quad cane in clinic/across gym floor while navigating objects/equipment.  Performs gait with 2 part gait sequencing with step to gait, though can exhibit step through gait for short distance    -handout regarding nutrition education -calcium sources and supplements.    Assessment/Plan Compliant/Cooperative with rehab efforts this session.  Subjectively reports no increased symptoms or discomfort with therapeutic exercise today.  Able to perform increased exercise/functional activity without increased L hip pain.  Benefits from verbal/tactile cues to ensure proper exercise performance and gait. .          Progress strengthening /stabilization /functional activity to improve L LE strength.           Timed:  Manual Therapy:         mins  87669;  Therapeutic Exercise:   28      mins  87175;     Neuromuscular Gabriella:    8    mins  58496;    Therapeutic Activity:     10     mins  50393;     Gait Training:    10       mins  46938;     Ultrasound:          mins  83880;    Self Care                    ___      mins 63797    Untimed:  Electrical Stimulation:         mins  36335 ( );  Mechanical Traction:         mins   95079;     Timed Treatment:   56   mins   Total Treatment:    56    mins  Faisal Hernandez, NIURKA  Physical Therapist  Assistant  [id number]

## 2024-07-23 ENCOUNTER — TREATMENT (OUTPATIENT)
Age: 74
End: 2024-07-23
Payer: MEDICARE

## 2024-07-23 DIAGNOSIS — R53.1 DECREASED STRENGTH, ENDURANCE, AND MOBILITY: ICD-10-CM

## 2024-07-23 DIAGNOSIS — Z74.09 DECREASED STRENGTH, ENDURANCE, AND MOBILITY: ICD-10-CM

## 2024-07-23 DIAGNOSIS — M85.852 OSTEOPENIA OF LEFT HIP: Primary | ICD-10-CM

## 2024-07-23 DIAGNOSIS — Z91.81 RISK FOR FALLS: ICD-10-CM

## 2024-07-23 DIAGNOSIS — R29.898 LEFT LEG WEAKNESS: ICD-10-CM

## 2024-07-23 DIAGNOSIS — R68.89 DECREASED STRENGTH, ENDURANCE, AND MOBILITY: ICD-10-CM

## 2024-07-23 NOTE — PROGRESS NOTES
Physical Therapy Daily Treatment Note    [facility] PT - [facility]  [address]  [address]  [place] [address]     Patient: Baltazar VILLANUEVA KAMERON   : 1950  Referring practitioner: Amy Boucher MD  Date of Initial Visit: Type: THERAPY  Noted: 2024  Today's Date: 2024  Patient seen for 5 sessions         Baltazar MELO reports: feels more confident/capable in regards to walking with quad cane         Subjective     Objective   -ambulates into clinic with rollator and carrying quad cane  -ambulates out of clinic using quad cane and navigating sidewalk and curb.  -L LE gait remains antalgic in regards to foot clearance and control with hip flexion and controlled lowering.  -noted pt still needs UE to lift L LE up onto mat table and into car.    See Exercise, Manual, and Modality Logs for complete treatment.   -discussed thinking of lifting leg up onto bed first vs just using hands to facilitate.  -needs verbal/tactile cue for proper technique, positioning and performance of SLR regimen.    Gait training in clinic x 10 min(2 intervals of 5 min) with quad cane in clinic/across gym floor while navigating objects/equipment, thresholds, carpeted area, etc.  Performs step through gait consistently this session with increased pace of ambulation around gym.    Assessment/Plan  Improved gait with quad cane in regards to being able to advance sequencing to more normalized gait pattern.  Still with some L LE antalgia due to control in regards to foot clearance.  Continues to benefit from verbal/tactile cues with exercises to ensure correct technique and positioning with SLR regimen.  Needs further strength In LE;s to allow for improved gait, ADL and functional performance.        Progress strengthening /stabilization /functional activity           Timed:  Manual Therapy:         mins  51504;  Therapeutic Exercise:     30    mins  16087;     Neuromuscular Gabriella:        mins  47963;    Therapeutic  Activity:     10     mins  19789;     Gait Training:     10      mins  02359;     Ultrasound:          mins  30529;    Self Care                    ___      mins 09582    Untimed:  Electrical Stimulation:         mins  83077 ( );  Mechanical Traction:         mins  58590;     Timed Treatment:   50   mins   Total Treatment:    50    mins  Faisal Hernandez [facility]  Physical Therapist  Assistant  [id number]

## 2024-07-25 ENCOUNTER — TREATMENT (OUTPATIENT)
Age: 74
End: 2024-07-25
Payer: MEDICARE

## 2024-07-25 DIAGNOSIS — R29.898 LEFT LEG WEAKNESS: ICD-10-CM

## 2024-07-25 DIAGNOSIS — R68.89 DECREASED STRENGTH, ENDURANCE, AND MOBILITY: ICD-10-CM

## 2024-07-25 DIAGNOSIS — Z91.81 RISK FOR FALLS: ICD-10-CM

## 2024-07-25 DIAGNOSIS — M85.852 OSTEOPENIA OF LEFT HIP: Primary | ICD-10-CM

## 2024-07-25 DIAGNOSIS — R53.1 DECREASED STRENGTH, ENDURANCE, AND MOBILITY: ICD-10-CM

## 2024-07-25 DIAGNOSIS — Z74.09 DECREASED STRENGTH, ENDURANCE, AND MOBILITY: ICD-10-CM

## 2024-07-25 NOTE — PROGRESS NOTES
Physical Therapy Daily Treatment Note    [facility] PT - [facility]  [address]  [address]  [place] [address]     Patient: Baltazar VILLANUEVA NELLYYanethMIROSLAVA   : 1950  Referring practitioner: Amy Boucher MD  Date of Initial Visit: Type: THERAPY  Noted: 2024  Today's Date: 2024  Patient seen for 6 sessions         Pushpaangely KAMERON reports: legs were tired from exercises performed last session, but I know that is what I need.  States that she can tell she can lift her leg a little higher to get in the car/put it on the bed but still needs some assistance from her arms to get leg all the way in or up.        Subjective     Objective   -ambulates in/out of clinic with rollator.  -ambulates in clinic with quad cane.    See Exercise, Manual, and Modality Logs for complete treatment.   Added:  sidelying clamshells L with addition of t band     Assessment/Plan  Decreased subjective complaints of L LE hip discomfort, feels stretches are helping. Able to progress exercises without increased discomfort of L LE, just early fatigue of isolated hip and quad musculature.  Benefits from verbal/tactile cues to ensure proper exercise performance, technique, positioning.        Progress per Plan of Care toward all goals.  Continue LE strengthening efforts           Timed:  Manual Therapy:         mins  56831;  Therapeutic Exercise:    30     mins  38804;     Neuromuscular Gabriella:        mins  51146;    Therapeutic Activity:    10      mins  06136;     Gait Training:           mins  60332;     Ultrasound:          mins  13836;    Self Care                    ___      mins 27526    Untimed:  Electrical Stimulation:         mins  39018 ( );  Mechanical Traction:         mins  12287;     Timed Treatment:  40    mins   Total Treatment:   40     mins  Faisal Hernandez PTA  Physical Therapist  Assistant  [id number]

## 2024-07-31 ENCOUNTER — TREATMENT (OUTPATIENT)
Age: 74
End: 2024-07-31
Payer: MEDICARE

## 2024-07-31 DIAGNOSIS — R29.898 LEFT LEG WEAKNESS: ICD-10-CM

## 2024-07-31 DIAGNOSIS — R68.89 DECREASED STRENGTH, ENDURANCE, AND MOBILITY: ICD-10-CM

## 2024-07-31 DIAGNOSIS — M85.852 OSTEOPENIA OF LEFT HIP: Primary | ICD-10-CM

## 2024-07-31 DIAGNOSIS — R53.1 DECREASED STRENGTH, ENDURANCE, AND MOBILITY: ICD-10-CM

## 2024-07-31 DIAGNOSIS — Z91.81 RISK FOR FALLS: ICD-10-CM

## 2024-07-31 DIAGNOSIS — Z74.09 DECREASED STRENGTH, ENDURANCE, AND MOBILITY: ICD-10-CM

## 2024-07-31 NOTE — PROGRESS NOTES
Physical Therapy Daily Progress Note      Patient: Baltazar MELO   : 1950  Referring practitioner: Amy Boucher MD  Date of Initial Visit: Type: THERAPY  Noted: 2024  Diagnosis/ICD-10 Code:  Osteopenia of left hip [M85.852]  Today's Date: 2024  Patient seen for 7 sessions         Baltazar MELO reports: occasional twinge but not much pain in left hip.  Numbness less frequent and medial thigh.  Walking at home with quad cane all the time still using rollator for safety out.              Objective   See Exercise, Manual, and Modality Logs for complete treatment.    Seated left hip flexion 4+/5  Knee extension 5/5, flexion 4/5  Function: able to compete 4 inch step up X 10 with hha and 3 X I with railing      Assessment/Plan  Patient presents with improving gait as left LE strength improves.  Minimal pain but fatigue with standing exercises.  Able to perform independent 4 inch step up onto left LE for the first time today.  She is performing HEP well and we will progress to more standing and balance exercises as she tolerates. B calf raise but still limited dorsiflexion in weightbearing    Progress per Plan of Care           Timed:  Manual Therapy:    -     mins  50768;  Therapeutic Exercise:    15     mins  57480;     Neuromuscular Gabriella:    -    mins  31551;    Therapeutic Activity:     15     mins  91074;   Gait Training:                 10     mins  24916;   Self Care                       -     mins   08023;  Orthotic fit/train              -     mins  80072;   Dry Needling Tri          __-__ DNTRIAL;   Ultrasound:     -     mins  77840;  Ionto                          -     mins 68911 ;  Self Pay 15                  -       mins   [email or web address]  Self Pay 30                  -      mins  PTSPMIN2      Untimed:  Electrical Stimulation:    -     mins  70679 ( );  Mechanical Traction:    -     mins  58896;   Dry Needling:              __-_ mins ,     Timed Treatment:   40   mins    Total Treatment:     50   mins  Kelsi Kirkpatrick, PT, CIDN  Physical Therapist

## 2024-08-04 NOTE — PROGRESS NOTES
[facility] Physical Therapy [place]   [address]  [place] [address]  Phone [phone]  Fax [phone]        Physical Therapy Re Certification Of Plan of Care    Patient: Baltazar MELO   : 1950  Diagnosis/ICD-10 Code:  Osteopenia of left hip [M85.852]  Referring practitioner: Amy Boucher MD  Date of Initial Visit: 2024  Today's Date: 2024  Patient seen for Visit count could not be calculated. Make sure you are using a visit which is associated with an episode. sessions         Visit Diagnoses:    ICD-10-CM ICD-9-CM   1. Osteopenia of left hip  M85.852 733.90   2. Left leg weakness  R29.898 729.89   3. Decreased strength, endurance, and mobility  R53.1 780.79    Z74.09 780.99    R68.89    4. Risk for falls  Z91.81 V15.88         Baltazar RAMOSMIROSLAVA reports: much less pain.  Walking short distance without cane at home.  Able to go up but not down steps without asssit.  Subjective Questionnaire: LEFS: 33%  Clinical Progress: improved  Home Program Compliance: Yes  Treatment has included: therapeutic exercise, neuromuscular re-education, therapeutic activity, and gait training      Subjective       Objective          Observations     Additional Hip Observation Details       Palpation     Additional Palpation Details       Tenderness     Left Hip   No tenderness in the greater trochanter.     Neurological Testing     Sensation     Hip   Left Hip   Intact: light touch    Right Hip   Intact: light touch    Additional Neurological Details  C/o numbness B toes, entire left foot but intact to light touch and proprioception    Active Range of Motion   Left Hip   Flexion: 110 (supine) degrees   Extension: 0 degrees   Abduction: 50 (with hip ER) degrees   Adduction: WFL    Right Hip   Flexion: 120 degrees   Abduction: 50 (wiht hip ER) degrees     Additional Active Range of Motion Details       Strength/Myotome Testing     Left Hip   Planes of Motion   Flexion: 4  Abduction:  4  Adduction: 4+  External rotation: 4+  Internal rotation: 4    Right Hip   Normal muscle strength  Planes of Motion   Abduction: 5  Adduction: 4+    Additional Strength Details  Left hamstring 4-/5, gastroc 4/5, ant tib 4/5 seated MMT. Quad 4+/5   Able to complete bridge now and able to complete above MMT in gravity positions on the table.    Tests     Left Hip   SLR: Negative.     Ambulation   Weight-Bearing Status   Weight-Bearing Status (Left): weight-bearing as tolerated   Assistive device used: quad cane    Additional Weight-Bearing Status Details  CGA with quad cane, Min a without AD    Ambulation: Level Surfaces   Ambulation with assistive device: independent  Ambulation without assistive device: minimum assist    Additional Level Surfaces Ambulation Details  Independent with rollator, min assist with cane until fatigues, mod assist without.    Ambulation: Stairs   Ascend stairs: minimum assist  Pattern: non-reciprocal  Railings: one rail  Descend stairs: minimum assist  Pattern: non-reciprocal  Railings: one rail    Observational Gait   Gait: antalgic   Left step length within functional limits. Increased left stance time. Decreased walking speed and stride length.   Left foot contact pattern: heel to toe    Additional Observational Gait Details  ER right LE, Trendelenburg left and lateral lean over left stance without AD, much improved with AD    Functional Assessment   Squat No pain.     Comments  I with sit to stand.  Sit to stand test: 6 in 30 seconds.  Ambulate 100 feet with fatigue with quad cane, needing increased assist.     General Comments     Hip Comments         Gastroc 4/5, pf 4/5 seted      Assessment/Plan  Patient demonstrates improvement in left hip AROM, strength in entire LE, balance, gait, and pain.  She is now able to walk short distances without an assistive device but does have increased side to side lean.  With her quad cane, she is walking with only CGA with minimal trunk lean.  She  still has weakness in the left hamstring, glut med, and ankle but her strength has improved. , She is progressing well towards the goals set during the initial evaluation. Continued therapy is needed to address strength, balance, and gait to return to full function safely.    Goal progress:  STG X 4weeks  1.  Patient will demonstrate increase left hip ROM in flexion and IR a total of 10 degrees. MET    2.  Patient will demonstrate increase in left hip strength >=1 grade. MET    3.  Ambulate with quad cane with min to CGA X 200 feet with no rest break for ambulation in the community. MET    4.  Ascend stairs normally with <=2/10 pain.NOT MET with regular, able to do with 4 inch step.    LTG X 8 weeks Progressing    1.  Patient will demonstrate ability to ambulate in the community for store and shopping with tolerable symptoms up to 2 hours.  2.  Patient will demonstrate >=4/5 strength.  3.  Patient to perform walking short distances in her home without AD and safe balance.  4. Independent with stairs and quad cane to safely navigate quad level home     Recommendations: Continue as planned progress balance and gait as able.  Timeframe: 1 month  Prognosis to achieve goals: good      Timed:         Manual Therapy:    -     mins  20385;     Therapeutic Exercise:    10     mins  23546;     Neuromuscular Gabriella:    -    mins  47772;    Therapeutic Activity:     25     mins  08496;     Gait Training:      10     mins  14420;     Ultrasound:     -     mins  30678;    Ionto                               -    mins   40743  Self Care                       -     mins   38326  Self Pay 15  __-___  mins   PTSPMIN1  Self Pay 30  _-___  mins  PTSPMIN2   Dry Needling Tri __-__ DNTRIAL      Un-Timed:  Electrical Stimulation:    -     mins  28977 ( );  Dry Needling     -     mins self-pay  Traction     -     mins 89825  Re-Eval                           -    mins  35006      Timed Treatment:   45   mins   Total Treatment:     50    mins          PT: Kelsi Kirkpatrick, PT, CIDN     [place] License:  [id number]

## 2024-08-06 ENCOUNTER — TREATMENT (OUTPATIENT)
Age: 74
End: 2024-08-06
Payer: MEDICARE

## 2024-08-06 DIAGNOSIS — R68.89 DECREASED STRENGTH, ENDURANCE, AND MOBILITY: ICD-10-CM

## 2024-08-06 DIAGNOSIS — R29.898 LEFT LEG WEAKNESS: ICD-10-CM

## 2024-08-06 DIAGNOSIS — M85.852 OSTEOPENIA OF LEFT HIP: Primary | ICD-10-CM

## 2024-08-06 DIAGNOSIS — R53.1 DECREASED STRENGTH, ENDURANCE, AND MOBILITY: ICD-10-CM

## 2024-08-06 DIAGNOSIS — Z91.81 RISK FOR FALLS: ICD-10-CM

## 2024-08-06 DIAGNOSIS — Z74.09 DECREASED STRENGTH, ENDURANCE, AND MOBILITY: ICD-10-CM

## 2024-08-06 PROCEDURE — 97530 THERAPEUTIC ACTIVITIES: CPT | Performed by: PHYSICAL THERAPIST

## 2024-08-06 PROCEDURE — 97110 THERAPEUTIC EXERCISES: CPT | Performed by: PHYSICAL THERAPIST

## 2024-08-06 PROCEDURE — 97116 GAIT TRAINING THERAPY: CPT | Performed by: PHYSICAL THERAPIST

## 2024-08-09 ENCOUNTER — TREATMENT (OUTPATIENT)
Age: 74
End: 2024-08-09
Payer: MEDICARE

## 2024-08-09 DIAGNOSIS — R53.1 DECREASED STRENGTH, ENDURANCE, AND MOBILITY: ICD-10-CM

## 2024-08-09 DIAGNOSIS — Z74.09 DECREASED STRENGTH, ENDURANCE, AND MOBILITY: ICD-10-CM

## 2024-08-09 DIAGNOSIS — R68.89 DECREASED STRENGTH, ENDURANCE, AND MOBILITY: ICD-10-CM

## 2024-08-09 DIAGNOSIS — M85.852 OSTEOPENIA OF LEFT HIP: Primary | ICD-10-CM

## 2024-08-09 DIAGNOSIS — R29.898 LEFT LEG WEAKNESS: ICD-10-CM

## 2024-08-09 NOTE — PROGRESS NOTES
Physical Therapy Daily Progress Note      Patient: Baltazar MELO   : 1950  Referring practitioner: Amy Boucher MD  Date of Initial Visit: Type: THERAPY  Noted: 2024  Diagnosis/ICD-10 Code:  Osteopenia of left hip [M85.852]  Today's Date: 2024  Patient seen for 8 sessions         Baltazar MELO reports: a little sore today.              Objective   See Exercise, Manual, and Modality Logs for complete treatment.    Supine hip abduction 3-/5, flexion 3-/5      Assessment/Plan  Patient presents with improving endurance with gait and bike but fatigued after treatment.  Still weakness with hip flexion and abduction.  No back pain with current exercises.       Progress strengthening /stabilization /functional activity           Timed:  Manual Therapy:    -     mins  36454;  Therapeutic Exercise:    15     mins  66762;     Neuromuscular Gabriella:    5    mins  30243;    Therapeutic Activity:     10     mins  70776;   Gait Training:                 10     mins  05327;   Self Care                       -     mins   17879;  Orthotic fit/train              -     mins  40220;   Dry Needling Tri          __-__ DNTRIAL;   Ultrasound:     -     mins  63250;  Ionto                          -     mins 34007 ;  Self Pay 15                  -       mins   [email or web address]  Self Pay 30                  -      mins  [email or web address]      Untimed:  Electrical Stimulation:    -     mins  57316 ( );  Mechanical Traction:    -     mins  31170;   Dry Needling:              __-_ mins 63105,     Timed Treatment:   40   mins   Total Treatment:     55   mins  Kelsi Kirkpatrick, PT, CIDN  Physical Therapist

## 2024-08-13 ENCOUNTER — TREATMENT (OUTPATIENT)
Age: 74
End: 2024-08-13
Payer: MEDICARE

## 2024-08-13 DIAGNOSIS — Z74.09 DECREASED STRENGTH, ENDURANCE, AND MOBILITY: ICD-10-CM

## 2024-08-13 DIAGNOSIS — M85.852 OSTEOPENIA OF LEFT HIP: Primary | ICD-10-CM

## 2024-08-13 DIAGNOSIS — R68.89 DECREASED STRENGTH, ENDURANCE, AND MOBILITY: ICD-10-CM

## 2024-08-13 DIAGNOSIS — Z91.81 RISK FOR FALLS: ICD-10-CM

## 2024-08-13 DIAGNOSIS — R53.1 DECREASED STRENGTH, ENDURANCE, AND MOBILITY: ICD-10-CM

## 2024-08-13 DIAGNOSIS — R29.898 LEFT LEG WEAKNESS: ICD-10-CM

## 2024-08-13 NOTE — PROGRESS NOTES
Physical Therapy Daily Progress Note      Patient: Baltazar MELO   : 1950  Referring practitioner: Amy Boucher MD  Date of Initial Visit: Type: THERAPY  Noted: 2024  Diagnosis/ICD-10 Code:  Osteopenia of left hip [M85.852]  Today's Date: 2024  Patient seen for 9 sessions         Baltazar MELO reports: hamstring stretch helped my spasm so much. Hip is feeling. Doing better with most things at home.  Stairs are my main difficulty.              Objective   See Exercise, Manual, and Modality Logs for complete treatment.    Transfers: demonstrated independent rolling, supine to sit, sit to supine      Assessment/Plan  Patient presents with improving hip abduction strength demonstrated by ability to perform lateral step up and transfers with lifting leg in abduction position.  Progress gait to no AD with CGA X 50 feet.       Progress strengthening /stabilization /functional activity           Timed:  Manual Therapy:    -     mins  03189;  Therapeutic Exercise:    15     mins  99109;     Neuromuscular Gabriella:    5    mins  08103;    Therapeutic Activity:     15     mins  20979;   Gait Training:                 15     mins  65637;   Self Care                       -     mins   78965;  Orthotic fit/train              -     mins  15755;   Dry Needling Tri          __--__ DNTRIAL;   Ultrasound:     -     mins  72857;  Ionto                          -     mins 83282 ;  Self Pay 15                  -       mins   [email or web address]  Self Pay 30                  -      mins  [email or web address]      Untimed:  Electrical Stimulation:    -     mins  39054 ( );  Mechanical Traction:    -     mins  87794;   Dry Needling:              __-_ mins 58121, 59854    Timed Treatment:   50   mins   Total Treatment:     60  mins  Kelsi Kirkpatrick PT, CIDN  Physical Therapist

## 2024-08-16 ENCOUNTER — TELEPHONE (OUTPATIENT)
Age: 74
End: 2024-08-16

## 2024-08-16 ENCOUNTER — TREATMENT (OUTPATIENT)
Age: 74
End: 2024-08-16
Payer: MEDICARE

## 2024-08-16 DIAGNOSIS — M85.852 OSTEOPENIA OF LEFT HIP: Primary | ICD-10-CM

## 2024-08-16 DIAGNOSIS — Z74.09 DECREASED STRENGTH, ENDURANCE, AND MOBILITY: ICD-10-CM

## 2024-08-16 DIAGNOSIS — Z91.81 RISK FOR FALLS: ICD-10-CM

## 2024-08-16 DIAGNOSIS — R29.898 LEFT LEG WEAKNESS: ICD-10-CM

## 2024-08-16 DIAGNOSIS — R68.89 DECREASED STRENGTH, ENDURANCE, AND MOBILITY: ICD-10-CM

## 2024-08-16 DIAGNOSIS — R53.1 DECREASED STRENGTH, ENDURANCE, AND MOBILITY: ICD-10-CM

## 2024-08-16 NOTE — PROGRESS NOTES
Physical Therapy Daily Treatment Note    [facility] PT - [facility]  [address]  [address]  [place] [address]     Patient: Baltazar RAMOSMIROSLAVA   : 1950  Referring practitioner: Amy Boucher MD  Date of Initial Visit: Type: THERAPY  Noted: 2024  Today's Date: 2024  Patient seen for 10 sessions         Baltazar KAMERON reports: Reports that she does feel a little stronger in her legs with physical therapy but still has issues with her left leg and does not feel it will support her with stair activities.        Subjective     Objective   See Exercise, Manual, and Modality Logs for complete treatment.   Gait training x 10 min total:  Performed ascending and descending stairs module steps with step to gait right leg leading in a sending and left leg and descending.  Performed gait training in clinic with and without assistive device x 4 separate intervals of 150 feet each.  Performed step up downs at 4 and 6 inch height x 10 repetitions each height each lower extremity.  Performed 4 inch lateral step up and downs 2 x 5 each lower extremity.  Perform sit to stand activities at 20, 18, 16 inch height x 10 each  Performed leg press bilateral lower extremities with ball squeeze 2 x 10 at 44 pounds.  Performed unilateral leg press 2 x 5 each lower extremity at 22 pounds.  Left lower extremity needed assistance for initiation by therapist with unilateral leg press activity.  Added sidestepping in parallel bars with orange Thera-Band around ankles x 4 laps.      Assessment/Plan patient demonstrated improved exercise tolerance and capability for prolonged weightbearing activities in regards to repetitions and duration of activity.  Patient still exhibits left lower extremity weakness and limitations with activities and certain positions.  Patient's gait today was improved and able to ambulate without assistive device for 150 feet x 2 repetitions.  Able to ascend and descend stairs with step to  gait and use of handrails without evident knee buckling.  Continues to improve with rehab efforts and progress is being made towards all goals.        Progress per Plan of Care and Progress strengthening /stabilization /functional activity.             Timed:  Manual Therapy:         mins  36842;  Therapeutic Exercise:    26     mins  18173;     Neuromuscular Gabriella:        mins  25496;    Therapeutic Activity:    12      mins  49473;     Gait Training:      10     mins  30089;     Ultrasound:          mins  21510;    Self Care                    ___      mins 61213    Untimed:  Electrical Stimulation:         mins  66575 ( );  Mechanical Traction:         mins  32625;     Timed Treatment:  48    mins   Total Treatment:    48    mins  Faisal Hernandez PTA  Physical Therapist  Assistant  [id number]

## 2024-08-20 ENCOUNTER — TREATMENT (OUTPATIENT)
Age: 74
End: 2024-08-20
Payer: MEDICARE

## 2024-08-20 DIAGNOSIS — R29.898 LEFT LEG WEAKNESS: ICD-10-CM

## 2024-08-20 DIAGNOSIS — Z74.09 DECREASED STRENGTH, ENDURANCE, AND MOBILITY: ICD-10-CM

## 2024-08-20 DIAGNOSIS — R68.89 DECREASED STRENGTH, ENDURANCE, AND MOBILITY: ICD-10-CM

## 2024-08-20 DIAGNOSIS — Z91.81 RISK FOR FALLS: ICD-10-CM

## 2024-08-20 DIAGNOSIS — M85.852 OSTEOPENIA OF LEFT HIP: Primary | ICD-10-CM

## 2024-08-20 DIAGNOSIS — R53.1 DECREASED STRENGTH, ENDURANCE, AND MOBILITY: ICD-10-CM

## 2024-08-20 NOTE — PROGRESS NOTES
Physical Therapy Daily Progress Note      Patient: Baltazar MELO   : 1950  Referring practitioner: Amy Boucher MD  Date of Initial Visit: Type: THERAPY  Noted: 2024  Diagnosis/ICD-10 Code:  Osteopenia of left hip [M85.852]  Today's Date: 2024  Patient seen for 11 sessions         Baltazar MELO reports: I was up 7 hours yesterday.  Right hip is sore.  I've been actually walking a lot more. I can  the shower from the shower bench by myself and I feel much more stable.              Objective   See Exercise, Manual, and Modality Logs for complete treatment.   Ascending and descending 6 inch stairs with railing independently with standby assist only.      Assessment/Plan  Patient presents with improving tandem and narrow stance balance and we were able to progress to eyes closed in this position with contact-guard.  Decreased endurance bilaterally in the lower extremities today due to increased activity yesterday but overall increasing endurance functionally with less pain..       Progress per Plan of Care           Timed:  Manual Therapy:    --     mins  17966;  Therapeutic Exercise:    15     mins  18923;     Neuromuscular Gabriella:    5    mins  63461;    Therapeutic Activity:     10     mins  64976;   Gait Training:                 10     mins  19769;   Self Care                       -     mins   51734;  Orthotic fit/train              -     mins  35673;   Dry Needling Tri          __-__ DNTRIAL;   Ultrasound:     -     mins  81835;  Ionto                          -     mins 62625 ;  Self Pay 15                  -       mins   [email or web address]  Self Pay 30                  -      mins  [email or web address]      Untimed:  Electrical Stimulation:    -     mins  07144 ( );  Mechanical Traction:    -     mins  07725;   Dry Needling:              __-_ mins ,     Timed Treatment:   40   mins   Total Treatment:     55   mins  Kelsi Kirkpatrick, PT, CIDN  Physical Therapist

## 2024-08-23 ENCOUNTER — TREATMENT (OUTPATIENT)
Age: 74
End: 2024-08-23
Payer: MEDICARE

## 2024-08-23 DIAGNOSIS — Z74.09 DECREASED STRENGTH, ENDURANCE, AND MOBILITY: ICD-10-CM

## 2024-08-23 DIAGNOSIS — M85.852 OSTEOPENIA OF LEFT HIP: Primary | ICD-10-CM

## 2024-08-23 DIAGNOSIS — R29.898 LEFT LEG WEAKNESS: ICD-10-CM

## 2024-08-23 DIAGNOSIS — R68.89 DECREASED STRENGTH, ENDURANCE, AND MOBILITY: ICD-10-CM

## 2024-08-23 DIAGNOSIS — Z91.81 RISK FOR FALLS: ICD-10-CM

## 2024-08-23 DIAGNOSIS — R53.1 DECREASED STRENGTH, ENDURANCE, AND MOBILITY: ICD-10-CM

## 2024-08-23 NOTE — PROGRESS NOTES
Physical Therapy Daily Progress Note      Patient: Baltazar MELO   : 1950  Referring practitioner: Amy Boucher MD  Date of Initial Visit: Type: THERAPY  Noted: 2024  Diagnosis/ICD-10 Code:  Osteopenia of left hip [M85.852]  Today's Date: 2024  Patient seen for 12 sessions         Baltazar RAMOSMRIOSLAVA reports: feeling better today.  I have a little bit of left hip pain when I came in but it got better after riding the [employer].              Objective   See Exercise, Manual, and Modality Logs for complete treatment.   Noted left Trendelenburg after performing prolonged standing exercises and gait.  Standing hip flexion on the left to 90 degrees without substitution during exercise.      Assessment/Plan  Patient presents with improving balance with gait.  No lob with ambulating further distances today with quad cane.  Fatigue in hip flexors at end of session making it difficult to complete the bike at the end of the session.  Continues with decreased dorsiflexion against gravity noted on step ups, frequently catching toe.  No longer needs contact assist or her cane when performing step ups as her strength is improved.   3. Patient to perform walking short distances in her home without AD and safe balance.  Goal met.    Progress per Plan of Care           Timed:  Manual Therapy:    -     mins  30218;  Therapeutic Exercise:    10     mins  03007;     Neuromuscular Gabriella:    11    mins  30999;    Therapeutic Activity:     14     mins  95610;   Gait Training:                 15     mins  58292;   Self Care                       -     mins   89791;  Orthotic fit/train              -     mins  01764;   Dry Needling Tri          __-__ DNTRIAL;   Ultrasound:     -     mins  65745;  Ionto                          -     mins 55621 ;  Self Pay 15                  -       mins   PTSPMIN1  Self Pay 30                  -      mins  PTSPMIN2      Untimed:  Electrical Stimulation:    -     mins  53968 (  );  Mechanical Traction:    -     mins  51247;   Dry Needling:              __-_ mins 20561, 20560  Paraffin       -  minutes 52172     Timed Treatment:   50   mins   Total Treatment:     60   mins  Kelsi Kirkpatrick PT, CIDN  Physical Therapist

## 2024-08-27 ENCOUNTER — TREATMENT (OUTPATIENT)
Age: 74
End: 2024-08-27
Payer: MEDICARE

## 2024-08-27 DIAGNOSIS — Z91.81 RISK FOR FALLS: ICD-10-CM

## 2024-08-27 DIAGNOSIS — R29.898 LEFT LEG WEAKNESS: ICD-10-CM

## 2024-08-27 DIAGNOSIS — M85.852 OSTEOPENIA OF LEFT HIP: Primary | ICD-10-CM

## 2024-08-27 DIAGNOSIS — R53.1 DECREASED STRENGTH, ENDURANCE, AND MOBILITY: ICD-10-CM

## 2024-08-27 DIAGNOSIS — Z74.09 DECREASED STRENGTH, ENDURANCE, AND MOBILITY: ICD-10-CM

## 2024-08-27 DIAGNOSIS — R68.89 DECREASED STRENGTH, ENDURANCE, AND MOBILITY: ICD-10-CM

## 2024-08-27 NOTE — PROGRESS NOTES
Physical Therapy Daily Progress Note      Patient: Baltazar MELO   : 1950  Referring practitioner: Amy Boucher MD  Date of Initial Visit: Type: THERAPY  Noted: 2024  Diagnosis/ICD-10 Code:  Osteopenia of left hip [M85.852]  Today's Date: 2024  Patient seen for 13 sessions         Baltazar MELO reports: some hip pain today.  Nothing that I am not used to it just comes and goes sometimes.              Objective   See Exercise, Manual, and Modality Logs for complete treatment.   Ambulating with quad cane with standby assist to contact-guard assist of 1  Less need for support with balance activities      Assessment/Plan  Patient struggled a little more with standing exercises and stairs due to increased fatigue.  Continues to be motivated and pushes herself with all of her exercises.  She was able to complete session without use of rollator only quad cane on the right.          Progress per Plan of Care           Timed:  Manual Therapy:    -     mins  40755;  Therapeutic Exercise:    10     mins  44145;     Neuromuscular Gabriella:    10   mins  46350;    Therapeutic Activity:     5     mins  00455;   Gait Training:                 10     mins  54764;   Self Care                       -     mins   42072;  Orthotic fit/train              -     mins  00750;   Dry Needling Tri          __-__ DNTRIAL;   Ultrasound:     -     mins  75823;  Ionto                          -     mins 80774 ;  Self Pay 15                  -       mins   [email or web address]  Self Pay 30                  -      mins  PTSPMIN2      Untimed:  Electrical Stimulation:    -     mins  19792 (MC );  Mechanical Traction:    -     mins  91152;   Dry Needling:              __-_ mins 79259, 67251  Paraffin       -  minutes 73302     Timed Treatment:   35   mins   Total Treatment:     40   mins  Kelsi Kirkpatrick, PT, CIDN  Physical Therapist

## 2024-09-03 ENCOUNTER — TREATMENT (OUTPATIENT)
Age: 74
End: 2024-09-03
Payer: MEDICARE

## 2024-09-03 DIAGNOSIS — M85.852 OSTEOPENIA OF LEFT HIP: Primary | ICD-10-CM

## 2024-09-03 DIAGNOSIS — R53.1 DECREASED STRENGTH, ENDURANCE, AND MOBILITY: ICD-10-CM

## 2024-09-03 DIAGNOSIS — Z74.09 DECREASED STRENGTH, ENDURANCE, AND MOBILITY: ICD-10-CM

## 2024-09-03 DIAGNOSIS — R68.89 DECREASED STRENGTH, ENDURANCE, AND MOBILITY: ICD-10-CM

## 2024-09-03 PROCEDURE — 97116 GAIT TRAINING THERAPY: CPT | Performed by: PHYSICAL THERAPIST

## 2024-09-03 PROCEDURE — 97530 THERAPEUTIC ACTIVITIES: CPT | Performed by: PHYSICAL THERAPIST

## 2024-09-03 PROCEDURE — 97110 THERAPEUTIC EXERCISES: CPT | Performed by: PHYSICAL THERAPIST

## 2024-09-03 NOTE — PROGRESS NOTES
[facility] [place]   [address]  [place] [address]  Phone [phone]  Fax [phone]        Physical Therapy Re Certification Of Plan of Care    Patient: Baltazar RAMOSMIROSLAVA   : 1950  Diagnosis/ICD-10 Code:  Osteopenia of left hip [M85.852]  Referring practitioner: Amy Boucher MD  Date of Initial Visit: 9/3/2024  Today's Date: 9/3/2024  Patient seen for 14 sessions         Visit Diagnoses:    ICD-10-CM ICD-9-CM   1. Osteopenia of left hip  M85.852 733.90   2. Decreased strength, endurance, and mobility  R53.1 780.79    Z74.09 780.99    R68.89          Baltazar RAMOSMIROSLAVA reports: swinging to left out of shower chair still hard and need to use my hands but I feel safe in getting in.  I was able to sit in a regular chair at the restaurant this weekend.  Got up by myself.  Subjective Questionnaire: LEFS:   Clinical Progress: improved  Home Program Compliance: Yes  Treatment has included: therapeutic exercise, neuromuscular re-education, therapeutic activity, and gait training      Subjective       Objective          Observations     Additional Hip Observation Details       Palpation     Additional Palpation Details       Tenderness     Left Hip   No tenderness in the greater trochanter.     Neurological Testing     Sensation     Hip   Left Hip   Intact: light touch    Right Hip   Intact: light touch    Additional Neurological Details  C/o numbness B toes, entire left foot but intact to light touch and proprioception    Active Range of Motion   Left Hip   Flexion: 120 (supine) degrees   Extension: 0 degrees   Abduction: 55 (with hip ER) degrees   Adduction: WFL    Right Hip   Flexion: 120 degrees   Abduction: 50 (wiht hip ER) degrees     Additional Active Range of Motion Details       Strength/Myotome Testing     Left Hip   Planes of Motion   Flexion: 4+  Abduction: 4+  Adduction: 4+  External rotation: 4+  Internal rotation: 4    Right Hip   Normal muscle  strength  Planes of Motion   Abduction: 5  Adduction: 4+    Additional Strength Details  Left hamstring 4-/5, gastroc 4+/5, ant tib 4/5 seated MMT. Quad 5/5   Able to complete bridge now and able to complete above MMT in gravity positions on the table.    Tests     Left Hip   SLR: Negative.     Ambulation   Weight-Bearing Status   Weight-Bearing Status (Left): weight-bearing as tolerated   Assistive device used: quad cane    Additional Weight-Bearing Status Details  SBA with quad cane, HHA without AD    Ambulation: Level Surfaces   Ambulation with assistive device: independent  Ambulation without assistive device: minimum assist    Additional Level Surfaces Ambulation Details       Ambulation: Stairs   Ascend stairs: contact guard assist  Pattern: non-reciprocal  Railings: one rail  Descend stairs: minimum assist  Pattern: non-reciprocal  Railings: one rail    Observational Gait   Gait: antalgic   Left step length within functional limits. Increased left stance time. Decreased walking speed and stride length.   Left foot contact pattern: heel to toe    Additional Observational Gait Details  ER right LE, Trendelenburg left and lateral lean over left stance without AD no longer with quad cane or rollator    Functional Assessment     Comments  Tandem balance right 9 seconds, left 3.  Narrow stance with with head turns, no loss.  Sit to stand test: 23 in 60 seconds.  Ambulate 1 minute 30 seconds 100 feet with fatigue with quad cane, needing increased assist.  46 seconds HHA, 50 feet     General Comments     Hip Comments               Assessment/Plan  Patient demonstrates full left hip ROM now.  She has remaining weakness in hamstring, hip internal rotation, ankle dorsiflexion, and plantarflexion but all muscle testing is improved.  She now ambulates independently with her cane safely and is able to ambulate with minimal support without AD.  Will continue to work on standing balance, gait, and prolonged  endurance.improvement She is progressing well towards the goals set during the initial evaluation. Continued therapy is needed to address standing and dynamic balance, use of UE while maintaining balance, and transfers involving hip abduction.    Goal progress:  STG X 4weeks  1.  Patient will demonstrate increase left hip ROM in flexion and IR a total of 10 degrees. MET     2.  Patient will demonstrate increase in left hip strength >=1 grade. MET     3.  Ambulate with quad cane with min to CGA X 200 feet with no rest break for ambulation in the community. MET     4.  Ascend stairs normally with <=2/10 pain. NOT MET with regular, able to do with 4 inch step.     LTG X 8 weeks Progressing     1.  Patient will demonstrate ability to ambulate in the community for store and shopping with tolerable symptoms up to 2 hours. MET  2.  Patient will demonstrate >=4/5 strength. MET except hamstring, hip IR, ankle  3.  Patient to perform walking short distances in her home without AD and safe balance. Progressing  4. Safe transfers in/out shower chair. Progressing  5.  Gait with quad cane 200 feet and use UE to open door without losing balance.  Not met     Recommendations: Continue as planned  Timeframe: 1 month 1-2X week  Prognosis to achieve goals: good      Timed:         Manual Therapy:    -     mins  52916;     Therapeutic Exercise:    10     mins  39491;     Neuromuscular Gabriella:    -    mins  28377;    Therapeutic Activity:     25     mins  77771;     Gait Training:      10     mins  46541;     Ultrasound:     -     mins  04840;    Ionto                               -    mins   88496  Self Care                       -     mins   62968  Self Pay 15  __-___  mins   PTSPMIN1  Self Pay 30  _-___  mins  PTSPMIN2   Dry Needling Tri __-__ DNTRIAL      Un-Timed:  Electrical Stimulation:    -     mins  70933 ( );  Dry Needling     -     mins self-pay  Traction     -     mins 65723  Re-Eval                           -    mins   02690      Timed Treatment:   40   mins   Total Treatment:     50   mins          PT: Kelsi Kirkpatrick PT, CANDE     [place] License:  [id number]

## 2024-09-06 ENCOUNTER — TREATMENT (OUTPATIENT)
Age: 74
End: 2024-09-06
Payer: MEDICARE

## 2024-09-06 DIAGNOSIS — R68.89 DECREASED STRENGTH, ENDURANCE, AND MOBILITY: ICD-10-CM

## 2024-09-06 DIAGNOSIS — Z91.81 RISK FOR FALLS: ICD-10-CM

## 2024-09-06 DIAGNOSIS — R53.1 DECREASED STRENGTH, ENDURANCE, AND MOBILITY: ICD-10-CM

## 2024-09-06 DIAGNOSIS — Z74.09 DECREASED STRENGTH, ENDURANCE, AND MOBILITY: ICD-10-CM

## 2024-09-06 DIAGNOSIS — R29.898 LEFT LEG WEAKNESS: ICD-10-CM

## 2024-09-06 DIAGNOSIS — M85.852 OSTEOPENIA OF LEFT HIP: Primary | ICD-10-CM

## 2024-09-06 NOTE — PROGRESS NOTES
Physical Therapy Daily Progress Note      Patient: Baltazar MELO   : 1950  Referring practitioner: Amy Boucher MD  Date of Initial Visit: Type: THERAPY  Noted: 2024  Diagnosis/ICD-10 Code:  Osteopenia of left hip [M85.852]  Today's Date: 2024  Patient seen for 15 sessions         Baltazar MELO reports: on dentist chair two hours.  Pain in right thigh. No increased numbness/tingling  Also reports that she has been up on it a lot doing walking shopping etc.  She tries to use it is much as possible.  She does not recall any specific incident other than that that may have flared up the right leg           Objective   See Exercise, Manual, and Modality Logs for complete treatment.   Tenderness left medial hamstring with spasm noted.  Mild increased antalgia.  Pain with full right knee extension  Full right knee ROM      Assessment/Plan  Patient presents with acute right hamstring pain likely form overuse.  It did improve with gentle stretching and activity but still appeared to be in spasm.  Advised patient to perform gentle range of motion, stretching, ice/heat and hold on standing exercises until her pain has improved.       Other  Resume exercises as able.  Monitor hamstring.         Timed:  Manual Therapy:    5     mins  11328;  Therapeutic Exercise:    15     mins  19834;     Neuromuscular Gabriella:    -    mins  69939;    Therapeutic Activity:     10     mins  53682;   Gait Training:                 -     mins  74528;   Self Care                       -     mins   04079;  Orthotic fit/train              -     mins  47329;   Dry Needling Tri          __-__ DNTRIAL;   Ultrasound:     -     mins  92426;  Ionto                          -     mins 06796 ;  Self Pay 15                  -       mins   PTSPMIN1  Self Pay 30                  -      mins  PTSPMIN2      Untimed:  Electrical Stimulation:    -     mins  56618 ( );  Mechanical Traction:    -     mins  09792;   Dry Needling:               __-_ mins 20561, 20560  Paraffin       -  minutes 68363     Timed Treatment:   30   mins   Total Treatment:     50   mins  Kelsi Kirkpatrick, PT, CIDN  Physical Therapist

## 2024-09-11 ENCOUNTER — TREATMENT (OUTPATIENT)
Age: 74
End: 2024-09-11
Payer: MEDICARE

## 2024-09-11 DIAGNOSIS — R68.89 DECREASED STRENGTH, ENDURANCE, AND MOBILITY: ICD-10-CM

## 2024-09-11 DIAGNOSIS — Z74.09 DECREASED STRENGTH, ENDURANCE, AND MOBILITY: ICD-10-CM

## 2024-09-11 DIAGNOSIS — R29.898 LEFT LEG WEAKNESS: ICD-10-CM

## 2024-09-11 DIAGNOSIS — M85.852 OSTEOPENIA OF LEFT HIP: Primary | ICD-10-CM

## 2024-09-11 DIAGNOSIS — Z91.81 RISK FOR FALLS: ICD-10-CM

## 2024-09-11 DIAGNOSIS — R53.1 DECREASED STRENGTH, ENDURANCE, AND MOBILITY: ICD-10-CM

## 2024-09-11 PROCEDURE — 97530 THERAPEUTIC ACTIVITIES: CPT | Performed by: PHYSICAL THERAPIST

## 2024-09-11 PROCEDURE — 97112 NEUROMUSCULAR REEDUCATION: CPT | Performed by: PHYSICAL THERAPIST

## 2024-09-11 PROCEDURE — 97110 THERAPEUTIC EXERCISES: CPT | Performed by: PHYSICAL THERAPIST

## 2024-09-11 NOTE — PROGRESS NOTES
That hamstrings not making you more unstable   physical Therapy Daily Progress Note      Patient: Baltazar VILLANUEVA NELLYYanethMIROSLAVA   : 1950  Referring practitioner: Amy Boucher MD  Date of Initial Visit: Type: THERAPY  Noted: 2024  Diagnosis/ICD-10 Code:  Osteopenia of left hip [M85.852]  Today's Date: 2024  Patient seen for 16 sessions         Baltazar MELO reports: hamstring is better but still very tender to touch.  Able to do standing exercises, only one episode where I felt a little off balance.           Objective   See Exercise, Manual, and Modality Logs for complete treatment.   Tenderness right medial hamstring with increased hypersensitivity.  Pain with manual stretching but improved    Assessment/Plan  Patient presents with improving tolerance to standing exercises but still having difficulty with right hamstring pain, guarding and some weakness.  This is contributing to balance issue already from the left lower extremity therefore continued strengthening and balance activity will be beneficial for the patient to decrease falls risk.       Progress strengthening /stabilization /functional activity           Timed:  Manual Therapy:    8     mins  46912;  Therapeutic Exercise:    10     mins  04409;     Neuromuscular Gabriella:    10    mins  98071;    Therapeutic Activity:     10     mins  81610;   Gait Training:                 -     mins  77598;   Self Care                       -     mins   35275;  Orthotic fit/train              -     mins  06904;   Dry Needling Tri          __-__ DNTRIAL;   Ultrasound:     -     mins  83494;  Ionto                          -     mins 04389 ;  Self Pay 15                  -       mins   PTSPMIN1  Self Pay 30                  -      mins  PTSPMIN2      Untimed:  Electrical Stimulation:    -     mins  65008 ( );  Mechanical Traction:    -     mins  99930;   Dry Needling:              __-_ mins 12982, 78981  Paraffin       -  minutes 55640     Timed Treatment:   38    mins   Total Treatment:     55   mins  Kelsi Kirkpatrick, PT, CIDN  Physical Therapist

## 2024-09-13 ENCOUNTER — TREATMENT (OUTPATIENT)
Age: 74
End: 2024-09-13
Payer: MEDICARE

## 2024-09-13 DIAGNOSIS — R68.89 DECREASED STRENGTH, ENDURANCE, AND MOBILITY: ICD-10-CM

## 2024-09-13 DIAGNOSIS — M85.852 OSTEOPENIA OF LEFT HIP: Primary | ICD-10-CM

## 2024-09-13 DIAGNOSIS — Z91.81 RISK FOR FALLS: ICD-10-CM

## 2024-09-13 DIAGNOSIS — R53.1 DECREASED STRENGTH, ENDURANCE, AND MOBILITY: ICD-10-CM

## 2024-09-13 DIAGNOSIS — R29.898 LEFT LEG WEAKNESS: ICD-10-CM

## 2024-09-13 DIAGNOSIS — Z74.09 DECREASED STRENGTH, ENDURANCE, AND MOBILITY: ICD-10-CM

## 2024-09-13 NOTE — PROGRESS NOTES
Physical Therapy Daily Progress Note      Patient: Baltazar MELO   : 1950  Referring practitioner: Amy Boucher MD  Date of Initial Visit: Type: THERAPY  Noted: 2024  Diagnosis/ICD-10 Code:  Osteopenia of left hip [M85.852]  Today's Date: 2024  Patient seen for 17 sessions         Baltazar RAMOSMIROSLAVA reports: walked a lot at school last night.  No increased symptoms and I was surprised.  Hamstring is doing better with just a small.  If pain on the right.  No problems at night           Objective   See Exercise, Manual, and Modality Logs for complete treatment.   Patient was late today due to traffic therefore shorter session was performed and she will perform her home exercises and will be walking a lot today.      Assessment/Plan  Patient presents with improving tolerance to walking long distances and stepping up.  She was able to do left leg step ups onto 4 inches with minimal assist from therapist.  Actively lifting hip into flexion and abduction to assist with in and out of shower chair and car.  Decreased right hamstring pain.  Attempted lateral and retro ambulation outside of the parallel bars and patient had much more difficulty.    Progress per Plan of Care           Timed:  Manual Therapy:    -     mins  34264;  Therapeutic Exercise:    10     mins  23869;     Neuromuscular Gabriella:    5    mins  62840;    Therapeutic Activity:     -     mins  72742;   Gait Trainin     mins  66424;   Self Care                       -     mins   19773;  Orthotic fit/train              -     mins  84782;   Dry Needling Tri          __-__ DNTRIAL;   Ultrasound:     -     mins  77414;  Ionto                          -     mins 14285 ;  Self Pay 15                  -       mins   [email or web address]  Self Pay 30                  -      mins  [email or web address]      Untimed:  Electrical Stimulation:    -     mins  74579 ( );  Mechanical Traction:    -     mins  31887;   Dry Needling:              __-_ mins  14266, 20560  Paraffin       -  minutes 83890     Timed Treatment:   23   mins   Total Treatment:     36   mins  Kelsi Kirkpatrick, PT, CIDN  Physical Therapist

## 2024-09-17 ENCOUNTER — TREATMENT (OUTPATIENT)
Age: 74
End: 2024-09-17
Payer: MEDICARE

## 2024-09-17 DIAGNOSIS — Z91.81 RISK FOR FALLS: ICD-10-CM

## 2024-09-17 DIAGNOSIS — R53.1 DECREASED STRENGTH, ENDURANCE, AND MOBILITY: ICD-10-CM

## 2024-09-17 DIAGNOSIS — R29.898 LEFT LEG WEAKNESS: ICD-10-CM

## 2024-09-17 DIAGNOSIS — Z74.09 DECREASED STRENGTH, ENDURANCE, AND MOBILITY: ICD-10-CM

## 2024-09-17 DIAGNOSIS — R68.89 DECREASED STRENGTH, ENDURANCE, AND MOBILITY: ICD-10-CM

## 2024-09-17 DIAGNOSIS — M85.852 OSTEOPENIA OF LEFT HIP: Primary | ICD-10-CM

## 2024-09-17 PROCEDURE — 97116 GAIT TRAINING THERAPY: CPT | Performed by: PHYSICAL THERAPIST

## 2024-09-17 PROCEDURE — 97112 NEUROMUSCULAR REEDUCATION: CPT | Performed by: PHYSICAL THERAPIST

## 2024-09-17 PROCEDURE — 97110 THERAPEUTIC EXERCISES: CPT | Performed by: PHYSICAL THERAPIST

## 2024-09-20 ENCOUNTER — TREATMENT (OUTPATIENT)
Age: 74
End: 2024-09-20
Payer: MEDICARE

## 2024-09-20 DIAGNOSIS — R68.89 DECREASED STRENGTH, ENDURANCE, AND MOBILITY: ICD-10-CM

## 2024-09-20 DIAGNOSIS — M85.852 OSTEOPENIA OF LEFT HIP: Primary | ICD-10-CM

## 2024-09-20 DIAGNOSIS — Z91.81 RISK FOR FALLS: ICD-10-CM

## 2024-09-20 DIAGNOSIS — R29.898 LEFT LEG WEAKNESS: ICD-10-CM

## 2024-09-20 DIAGNOSIS — Z74.09 DECREASED STRENGTH, ENDURANCE, AND MOBILITY: ICD-10-CM

## 2024-09-20 DIAGNOSIS — R53.1 DECREASED STRENGTH, ENDURANCE, AND MOBILITY: ICD-10-CM

## 2024-09-24 ENCOUNTER — TREATMENT (OUTPATIENT)
Age: 74
End: 2024-09-24
Payer: MEDICARE

## 2024-09-24 DIAGNOSIS — Z74.09 DECREASED STRENGTH, ENDURANCE, AND MOBILITY: ICD-10-CM

## 2024-09-24 DIAGNOSIS — R53.1 DECREASED STRENGTH, ENDURANCE, AND MOBILITY: ICD-10-CM

## 2024-09-24 DIAGNOSIS — Z91.81 RISK FOR FALLS: ICD-10-CM

## 2024-09-24 DIAGNOSIS — M85.852 OSTEOPENIA OF LEFT HIP: Primary | ICD-10-CM

## 2024-09-24 DIAGNOSIS — R29.898 LEFT LEG WEAKNESS: ICD-10-CM

## 2024-09-24 DIAGNOSIS — R68.89 DECREASED STRENGTH, ENDURANCE, AND MOBILITY: ICD-10-CM

## 2024-09-24 PROCEDURE — 97110 THERAPEUTIC EXERCISES: CPT | Performed by: PHYSICAL THERAPIST

## 2024-09-24 PROCEDURE — 97116 GAIT TRAINING THERAPY: CPT | Performed by: PHYSICAL THERAPIST

## 2024-09-24 PROCEDURE — 97530 THERAPEUTIC ACTIVITIES: CPT | Performed by: PHYSICAL THERAPIST

## 2024-09-25 ENCOUNTER — TREATMENT (OUTPATIENT)
Age: 74
End: 2024-09-25
Payer: MEDICARE

## 2024-09-25 DIAGNOSIS — R29.898 LEFT LEG WEAKNESS: ICD-10-CM

## 2024-09-25 DIAGNOSIS — Z74.09 DECREASED STRENGTH, ENDURANCE, AND MOBILITY: ICD-10-CM

## 2024-09-25 DIAGNOSIS — R68.89 DECREASED STRENGTH, ENDURANCE, AND MOBILITY: ICD-10-CM

## 2024-09-25 DIAGNOSIS — M85.852 OSTEOPENIA OF LEFT HIP: Primary | ICD-10-CM

## 2024-09-25 DIAGNOSIS — R53.1 DECREASED STRENGTH, ENDURANCE, AND MOBILITY: ICD-10-CM

## 2024-09-25 DIAGNOSIS — Z91.81 RISK FOR FALLS: ICD-10-CM

## 2024-10-09 ENCOUNTER — TREATMENT (OUTPATIENT)
Age: 74
End: 2024-10-09
Payer: MEDICARE

## 2024-10-09 DIAGNOSIS — Z74.09 DECREASED STRENGTH, ENDURANCE, AND MOBILITY: ICD-10-CM

## 2024-10-09 DIAGNOSIS — R68.89 DECREASED STRENGTH, ENDURANCE, AND MOBILITY: ICD-10-CM

## 2024-10-09 DIAGNOSIS — M85.852 OSTEOPENIA OF LEFT HIP: Primary | ICD-10-CM

## 2024-10-09 DIAGNOSIS — R29.898 LEFT LEG WEAKNESS: ICD-10-CM

## 2024-10-09 DIAGNOSIS — R53.1 DECREASED STRENGTH, ENDURANCE, AND MOBILITY: ICD-10-CM

## 2024-10-09 DIAGNOSIS — Z91.81 RISK FOR FALLS: ICD-10-CM

## 2024-10-09 NOTE — PROGRESS NOTES
[facility] [place]   [address]  [place] [address]  Phone [phone]  Fax [phone]        Physical Therapy Re Certification Of Plan of Care    Patient: Baltazar MELO   : 1950  Diagnosis/ICD-10 Code:  Osteopenia of left hip [M85.852]  Referring practitioner: Amy Boucher MD  Date of Initial Visit: 10/9/2024  Today's Date: 10/9/2024  Patient seen for 22 sessions         Visit Diagnoses:    ICD-10-CM ICD-9-CM   1. Osteopenia of left hip  M85.852 733.90   2. Decreased strength, endurance, and mobility  R53.1 780.79    Z74.09 780.99    R68.89    3. Left leg weakness  R29.898 729.89   4. Risk for falls  Z91.81 V15.88         Baltazar MELO reports: a little bit better. Walking around store at least 20 minutes with her walker.  She does not have any pain in her left hip and the right hamstring pain has nearly resolved.  She still would like to ultimately get off the rollator and only use her cane.  She has had a fall recently and feels like her balance is better.  Subjective Questionnaire: LEFS: 43%  Clinical Progress: improved  Home Program Compliance: Yes  Treatment has included: therapeutic exercise, neuromuscular re-education, manual therapy, therapeutic activity, and gait training      Subjective       Objective          Observations     Additional Hip Observation Details       Palpation     Additional Palpation Details   No significant tenderness along the left hip just reports numbness when palpating the proximal hamstring.    Tenderness     Left Hip   No tenderness in the greater trochanter.     Neurological Testing     Sensation     Hip   Left Hip   Intact: light touch  Diminished: light touch    Right Hip   Intact: light touch    Comments   Left light touch: proximal thigh.     Additional Neurological Details  C/o numbness B toes, entire left foot but intact to light touch and proprioception    Active Range of Motion   Left Hip   Flexion: 120  (supine seated 85) degrees   Extension: 0 degrees   Abduction: 31 (seated) degrees   Adduction: WFL    Right Hip   Flexion: 120 degrees   Abduction: 50 (wiht hip ER) degrees     Additional Active Range of Motion Details       Strength/Myotome Testing     Left Hip   Planes of Motion   Flexion: 5  Abduction: 4+  Adduction: 4+  External rotation: 4+  Internal rotation: 4+    Right Hip   Normal muscle strength  Planes of Motion   Abduction: 5  Adduction: 4+    Additional Strength Details  Left hamstring 4/5, gastroc 4+/5, ant tib 4/5 seated MMT. Quad 5/5.  Testing performed in sitting.   Able to complete bridge now and able to complete above MMT in gravity positions on the table.    Tests     Left Hip   SLR: Negative.     Ambulation   Weight-Bearing Status   Weight-Bearing Status (Left): weight-bearing as tolerated   Assistive device used: quad cane    Additional Weight-Bearing Status Details  SBA with quad cane, HHA without AD    Ambulation: Level Surfaces   Ambulation with assistive device: independent  Ambulation without assistive device: minimum assist    Additional Level Surfaces Ambulation Details       Ambulation: Stairs   Ascend stairs: contact guard assist  Pattern: non-reciprocal  Railings: one rail  Descend stairs: minimum assist  Pattern: non-reciprocal  Railings: one rail    Observational Gait   Gait: antalgic   Left step length within functional limits. Increased left stance time. Decreased walking speed and stride length.   Left foot contact pattern: heel to toe    Additional Observational Gait Details  ER right LE, Trendelenburg left and lateral lean over left stance without AD no longer with quad cane or rollator    Functional Assessment     Comments  Tandem balance right 60 seconds, left 5.  Narrow stance with with head turns, no loss.  Sit to stand test: 16 in 45 seconds.  Ambulate 1 minute 30 seconds 100 feet with fatigue with quad cane, needing increased assist.   100 feet     General Comments      Hip Comments               Assessment/Plan  Patient demonstrates improvement in left lower extremity strength, balance and narrow stance and tandem, ambulation with decreased need for assistive device, increased endurance, and decreased pain.  She is progressing well towards the goals set during the initial evaluation. Continued therapy is needed to address lower extremity and core weakness, balance, endurance, gait,and stairs to achieve maximum function and decrease falls risk.    Goal progress:  STG X 4weeks  1.  Patient will demonstrate increase left hip ROM in flexion and IR a total of 10 degrees. MET     2.  Patient will demonstrate increase in left hip strength >=1 grade. MET     3.  Ambulate with quad cane with min to CGA X 200 feet with no rest break for ambulation in the community. MET     4.  Ascend stairs normally with <=2/10 pain. NOT MET with regular, able to do with 4 inch step.     LTG X 8 weeks Progressing     1.  Patient will demonstrate ability to ambulate in the community for store and shopping with tolerable symptoms up to 2 hours. MET intermittently with rest breaks.  2.  Patient will demonstrate >=4/5 strength. MET except hamstring, hip IR, ankle  3.  Patient to perform walking short distances in her home without AD and safe balance. Progressing  4. Safe transfers in/out shower chair.  Met   5.  Gait with quad cane 200 feet and use UE to open door without losing balance.  Progressing     Recommendations: Continue as planned  Timeframe: 1 month  Prognosis to achieve goals: good      Timed:         Manual Therapy:    -     mins  17437;     Therapeutic Exercise:    10     mins  49762;     Neuromuscular Gabriella:    -    mins  82708;    Therapeutic Activity:     20     mins  00681;     Gait Training:      10     mins  36022;     Ultrasound:     -     mins  59116;    Ionto                               -    mins   74075  Self Care                       -     mins   54382  Self Pay 15  __-___  mins    PTSPMIN1  Self Pay 30  _-___  mins  PTSPMIN2   Dry Needling Tri __-__ DNTRIAL      Un-Timed:  Electrical Stimulation:    -     mins  11037 ( );  Dry Needling     -     mins self-pay  Traction     -     mins 05524  Re-Eval                           -    mins  54908      Timed Treatment:   40   mins   Total Treatment:     45   mins          PT: Kelsi Kirkpatrick PT, ANA PAULAN     [place] License:  [id number]    Electronically signed by Kelsi Kirkpatrick PT, 10/08/24, 9:39 PM EDT    Certification Period: 10/9/2024 thru 1/6/2025  I certify that the therapy services are furnished while this patient is under my care.  The services outlined above are required by this patient, and will be reviewed every 90 days.         Physician Signature:__________________________________________________    PHYSICIAN: Amy Boucher MD      DATE:     Please sign and return via fax to [phone].  Thank you, [facility].

## 2024-10-15 ENCOUNTER — TREATMENT (OUTPATIENT)
Age: 74
End: 2024-10-15
Payer: MEDICARE

## 2024-10-15 DIAGNOSIS — R53.1 DECREASED STRENGTH, ENDURANCE, AND MOBILITY: ICD-10-CM

## 2024-10-15 DIAGNOSIS — Z91.81 RISK FOR FALLS: ICD-10-CM

## 2024-10-15 DIAGNOSIS — R29.898 LEFT LEG WEAKNESS: ICD-10-CM

## 2024-10-15 DIAGNOSIS — Z74.09 DECREASED STRENGTH, ENDURANCE, AND MOBILITY: ICD-10-CM

## 2024-10-15 DIAGNOSIS — R68.89 DECREASED STRENGTH, ENDURANCE, AND MOBILITY: ICD-10-CM

## 2024-10-15 DIAGNOSIS — M85.852 OSTEOPENIA OF LEFT HIP: Primary | ICD-10-CM

## 2024-10-15 NOTE — PROGRESS NOTES
Physical Therapy Daily Progress Note      Patient: Baltazar MELO   : 1950  Referring practitioner: Amy Boucher MD  Date of Initial Visit: Type: THERAPY  Noted: 2024  Diagnosis/ICD-10 Code:  Osteopenia of left hip [M85.852]  Today's Date: 10/15/2024  Patient seen for 23 sessions         Baltazar MELO reports: Patient reports she rode in the car and attended a program of her daughters where she had to sit for long periods of time.  Her leg was very fatigued and she noted more pain in the right ankle and hamstring.  Today she is complaining of increased numbness in the left upper thigh and she is very tired.  At the end of the night I was tired and had to go up 10 steps I almost fell but my niece was able to give me support from the back              Objective   See Exercise, Manual, and Modality Logs for complete treatment.   Negative straight leg raise and slump test in sitting  Numbness increased around ischial tuberosity on the left.      Assessment/Plan  Patient presents increased paresthesias in the left proximal thigh and fatigue today.  She did tolerate short amount of time and standing but this increased the numbness so we completed most exercises in seated.  Will continue to address strength and endurance as well as gait and functional activities to improve function and decrease falls risk.  Advised patient to purchase a gait belt for use at home when climbing stairs to improve safety of self and caregiver with her while performing these activities.               Timed:  Manual Therapy:    -     mins  50129;  Therapeutic Exercise:    10     mins  57891;     Neuromuscular Gabriella:    -    mins  36994;    Therapeutic Activity:     15     mins  31693;   Gait Training:                 10     mins  26551;   Self Care                       -     mins   24324;  Orthotic fit/train              -     mins  68265;   Dry Needling Tri          __-__ DNTRIAL;   Ultrasound:     -     mins  05551;  Ionto                           -     mins 31554 ;  Self Pay 15                  -       mins   [email or web address]  Self Pay 30                  -      mins  PTSPMIN2      Untimed:  Electrical Stimulation:    -     mins  31219 ( );  Mechanical Traction:    -     mins  06646;   Dry Needling:              __-_ mins 20561, 20560  Paraffin       -  minutes 77161     Timed Treatment:   35   mins   Total Treatment:     50   mins  Kelsi Kirkpatrick, PT, [facility]NA [facility]ULAN  Physical Therapist

## 2024-10-22 ENCOUNTER — TREATMENT (OUTPATIENT)
Age: 74
End: 2024-10-22
Payer: MEDICARE

## 2024-10-22 DIAGNOSIS — Z91.81 RISK FOR FALLS: ICD-10-CM

## 2024-10-22 DIAGNOSIS — M85.852 OSTEOPENIA OF LEFT HIP: Primary | ICD-10-CM

## 2024-10-22 DIAGNOSIS — R29.898 LEFT LEG WEAKNESS: ICD-10-CM

## 2024-10-22 DIAGNOSIS — R53.1 DECREASED STRENGTH, ENDURANCE, AND MOBILITY: ICD-10-CM

## 2024-10-22 DIAGNOSIS — R68.89 DECREASED STRENGTH, ENDURANCE, AND MOBILITY: ICD-10-CM

## 2024-10-22 DIAGNOSIS — Z74.09 DECREASED STRENGTH, ENDURANCE, AND MOBILITY: ICD-10-CM

## 2024-10-22 PROCEDURE — 97116 GAIT TRAINING THERAPY: CPT | Performed by: PHYSICAL THERAPIST

## 2024-10-22 PROCEDURE — 97110 THERAPEUTIC EXERCISES: CPT | Performed by: PHYSICAL THERAPIST

## 2024-10-22 PROCEDURE — 97530 THERAPEUTIC ACTIVITIES: CPT | Performed by: PHYSICAL THERAPIST

## 2024-10-22 NOTE — PROGRESS NOTES
Physical Therapy Daily Progress Note      Patient: Baltazar MELO   : 1950  Referring practitioner: Amy Boucher MD  Date of Initial Visit: Type: THERAPY  Noted: 2024  Diagnosis/ICD-10 Code:  Osteopenia of left hip [M85.852]  Today's Date: 10/22/2024  Patient seen for 24 sessions         Baltazar MELO reports: She had recent blood work done at her endocrinologist and received a call from her doctor office stating that blood counts low, seeing oncologist/hematologist Friday, don't know restrictions yet.  At this time they have not given her any restrictions but wanted therapy to be aware.  Patient states today she still has the numbness in the left hip and has noted some more fatigue and more sleeping throughout the day.       Subjective       Objective   See Exercise, Manual, and Modality Logs for complete treatment.   Patient education: Discussed changes in symptoms and energy level with activity to be aware of with recent alterations in lab work.  Discussed listening to signs and symptoms of pain and weakness and taking breaks as needed.  Family education: Patient's  was present today and was instructed in use of the gait belt with gait training on flat surfaces as well as on stairs for maximum safety.  Palpation: No increased tenderness in thighs or lower legs, or along dermatomes.     Before exercise   During exercise       HR bpm  66    66-77    O2 saturation% 100%   %         Assessment/Plan  Patient presents with vitals within normal limits during activity today however she did experience increased weakness and needed more rest breaks.  No increased lower extremity pain while performing exercises.  She will be meeting with her oncologist Friday with instructions on treatment and we will proceed with therapy per their recommendations.           Timed:  Manual Therapy:    -     mins  38263;  Therapeutic Exercise:    15     mins  73395;     Neuromuscular Gabriella:    -    mins  32081;   "  Therapeutic Activity:     11     mins  58969;   Gait Training:                 15     mins  51875;   Self Care                       -     mins   22363;  Orthotic fit/train              -     mins  75411;   Dry Needling Tri          __-__ DNTRIAL;   Ultrasound:     -     mins  67607;  Ionto                          -     mins 10687 ;  Self Pay 15                  -       mins   [email or web address]  Self Pay 30                  -      mins  PTSPMIN2      Untimed:  Electrical Stimulation:    -     mins  03649 ( );  Mechanical Traction:    -     mins  81343;   Dry Needling:              __-_ mins 66795, 76875  Paraffin       -  minutes 06174     Timed Treatment:   41   mins   Total Treatment:     52   mins  Kelsi Kirkpatrick, PT, [facility]  Physical Therapist                  \"Parts of this note may be an electronic transcription/translation of spoken language to printed text using the Dragon dictation system  "

## 2024-10-25 ENCOUNTER — TREATMENT (OUTPATIENT)
Age: 74
End: 2024-10-25
Payer: MEDICARE

## 2024-10-25 DIAGNOSIS — Z74.09 DECREASED STRENGTH, ENDURANCE, AND MOBILITY: ICD-10-CM

## 2024-10-25 DIAGNOSIS — Z91.81 RISK FOR FALLS: ICD-10-CM

## 2024-10-25 DIAGNOSIS — R68.89 DECREASED STRENGTH, ENDURANCE, AND MOBILITY: ICD-10-CM

## 2024-10-25 DIAGNOSIS — R29.898 LEFT LEG WEAKNESS: ICD-10-CM

## 2024-10-25 DIAGNOSIS — M85.852 OSTEOPENIA OF LEFT HIP: Primary | ICD-10-CM

## 2024-10-25 DIAGNOSIS — R53.1 DECREASED STRENGTH, ENDURANCE, AND MOBILITY: ICD-10-CM

## 2024-10-25 NOTE — PROGRESS NOTES
Physical Therapy Daily Progress Note      Patient: Baltazar MELO   : 1950  Referring practitioner: Amy Boucher MD  Date of Initial Visit: Type: THERAPY  Noted: 2024  Diagnosis/ICD-10 Code:  Osteopenia of left hip [M85.852]  Today's Date: 10/25/2024  Patient seen for 25 sessions         Baltazar RAMOSMIROSLAVA reports: saw MD, told me my hemoglobin is low.  First time I went up stairs my  had help me. Going to do IV iron every other week until level 11 then can switch to injections.Has to tested weekly. MD said ok to exercise and do PT when I start infusions.  Did not give me any limitations in the meantime. Pain wise no difference today.       Subjective       Objective   See Exercise, Manual, and Modality Logs for complete treatment.   Before exercise   During exercise  After exercises    HR bpm 54    71-74, 93, 72   60    O2 saturation%   , 100   99    Reviewed patient's labs with her and discussed effects on muscles with exercise and cardio activities.  Educated on signs and symptoms to monitor for and when to stop activity and rest.      Assessment/Plan  Patient released to continue PT at her side as tolerated.  Had a long discussion with signs and symptoms to be aware of with current lab numbers.  Focused on static balance activities like extremity, and gentle movements of upper and lower extremities.  Monitored exertion, heart rate and oxygen saturation throughout the treatment and patient tolerated all very well without any pain.  Discussed number importance of energy conservation until levels return to normal and effects of exercise with the most recent lab values.       Continue gentle physical therapy with caution and monitoring for signs and symptoms.           Timed:  Manual Therapy:    -     mins  02585;  Therapeutic Exercise:    10     mins  59358;     Neuromuscular Gabriella:    10    mins  61594;    Therapeutic Activity:     18     mins  60353;   Gait Training:                  "-     mins  28438;   Self Care                       -     mins   86218;  Orthotic fit/train              -     mins  51412;   Dry Needling Tri          __-__ DNTRIAL;   Ultrasound:     -     mins  94696;  Ionto                          -     mins 54630 ;  Self Pay 15                  -       mins   [email or web address]  Self Pay 30                  -      mins  PTSPMIN2      Untimed:  Electrical Stimulation:    -     mins  72852 ( );  Mechanical Traction:    -     mins  24672;   Dry Needling:              __-_ mins 20571, 69708  Paraffin       -  minutes 91109     Timed Treatment:   38   mins   Total Treatment:     40   mins  Kelsi Kirkpatrick, PT, [facility]  Physical Therapist                  \"Parts of this note may be an electronic transcription/translation of spoken language to printed text using the Dragon dictation system  "

## 2024-10-29 ENCOUNTER — TREATMENT (OUTPATIENT)
Age: 74
End: 2024-10-29
Payer: MEDICARE

## 2024-10-29 DIAGNOSIS — R29.898 LEFT LEG WEAKNESS: ICD-10-CM

## 2024-10-29 DIAGNOSIS — R53.1 DECREASED STRENGTH, ENDURANCE, AND MOBILITY: ICD-10-CM

## 2024-10-29 DIAGNOSIS — R68.89 DECREASED STRENGTH, ENDURANCE, AND MOBILITY: ICD-10-CM

## 2024-10-29 DIAGNOSIS — Z91.81 RISK FOR FALLS: ICD-10-CM

## 2024-10-29 DIAGNOSIS — Z74.09 DECREASED STRENGTH, ENDURANCE, AND MOBILITY: ICD-10-CM

## 2024-10-29 DIAGNOSIS — M85.852 OSTEOPENIA OF LEFT HIP: Primary | ICD-10-CM

## 2024-10-29 PROCEDURE — 97530 THERAPEUTIC ACTIVITIES: CPT | Performed by: PHYSICAL THERAPIST

## 2024-10-29 PROCEDURE — 97112 NEUROMUSCULAR REEDUCATION: CPT | Performed by: PHYSICAL THERAPIST

## 2024-10-29 PROCEDURE — 97110 THERAPEUTIC EXERCISES: CPT | Performed by: PHYSICAL THERAPIST

## 2024-10-29 NOTE — PROGRESS NOTES
Physical Therapy Daily Progress Note      Patient: Baltazar MELO   : 1950  Referring practitioner: Amy Boucher MD  Date of Initial Visit: Type: THERAPY  Noted: 2024  Diagnosis/ICD-10 Code:  Osteopenia of left hip [M85.852]  Today's Date: 10/29/2024  Patient seen for 26 sessions         Baltazar MELO reports: no change from last session.  Not sure yet when I will start infusions.  No increased soreness with exercises or activities. Some numbness at left heel at night.       Subjective       Objective   See Exercise, Manual, and Modality Logs for complete treatment.      Before exercise   During exercise  After exercises    HR bpm     60-80bpm   53-71    O2 saturation%     %   100%         Assessment/Plan  Patient presents with improving tolerance to light endurance, strength, and balance with vitals WNL.  MD approval for normal activity as tolerated.  Able to perform light activity without increased pain or loss of balance.        Other continue to monitor symptoms and maintain balance and strength as able during infusion period.           Timed:  Manual Therapy:    -     mins  30678;  Therapeutic Exercise:    10     mins  17470;     Neuromuscular Gabriella:    10    mins  13601;    Therapeutic Activity:     15     mins  82880;   Gait Trainin     mins  71197;   Self Care                       -     mins   19911;  Orthotic fit/train              -     mins  54268;   Dry Needling Tri          __-__ DNTRIAL;   Ultrasound:     -     mins  79799;  Ionto                          -     mins 03814 ;  Self Pay 15                  -       mins   [email or web address]  Self Pay 30                  -      mins  PTSPMIN2      Untimed:  Electrical Stimulation:    -     mins  27219 ( );  Mechanical Traction:    -     mins  09124;   Dry Needling:              __-_ mins 39061, 27674  Paraffin       -  minutes 01940     Timed Treatment:   40   mins   Total Treatment:     50   mins  Kelsi Kirkpatrick PT,  "[facility]  Physical Therapist                  \"Parts of this note may be an electronic transcription/translation of spoken language to printed text using the Dragon dictation system  "

## 2024-11-01 ENCOUNTER — TREATMENT (OUTPATIENT)
Age: 74
End: 2024-11-01
Payer: MEDICARE

## 2024-11-01 DIAGNOSIS — M85.852 OSTEOPENIA OF LEFT HIP: Primary | ICD-10-CM

## 2024-11-01 DIAGNOSIS — Z91.81 RISK FOR FALLS: ICD-10-CM

## 2024-11-01 DIAGNOSIS — Z74.09 DECREASED STRENGTH, ENDURANCE, AND MOBILITY: ICD-10-CM

## 2024-11-01 DIAGNOSIS — R68.89 DECREASED STRENGTH, ENDURANCE, AND MOBILITY: ICD-10-CM

## 2024-11-01 DIAGNOSIS — R53.1 DECREASED STRENGTH, ENDURANCE, AND MOBILITY: ICD-10-CM

## 2024-11-01 DIAGNOSIS — R29.898 LEFT LEG WEAKNESS: ICD-10-CM

## 2024-11-01 NOTE — PROGRESS NOTES
Physical Therapy Daily Progress Note      Patient: Baltazar MELO   : 1950  Referring practitioner: Amy Boucher MD  Date of Initial Visit: Type: THERAPY  Noted: 2024  Diagnosis/ICD-10 Code:  Osteopenia of left hip [M85.852]  Today's Date: 2024  Patient seen for 27 sessions         Baltazar RAMOSMIROSLAVA reports: soreness left back of thigh while I was sleeping.  None with walking.  I am having less numbness in the upper thigh.  Have not really done a lot of activity but I am having soreness in the legs.    Subjective       Objective   See Exercise, Manual, and Modality Logs for complete treatment.   Vitals during exercise: Oxygen saturation %, heart rate 74-115bpm  Tenderness along medial to proximal hamstring with tightness and guarding.    Assessment/Plan  Patient presents with continued weakness, soreness and increased tenderness in the left lower extremity.  Patient still waiting on authorization to begin iron treatments.  Feel it is too weak to continue therapy so that she may tolerate more strengthening and avoid injuring herself while performing exercises.  Discussed light exercises to perform during the day to maintain strength and what signs and symptoms to be aware of when muscles become fatigued..       Cancel treatment until iron infusions began.           Timed:  Manual Therapy:    -     mins  84464;  Therapeutic Exercise:    15     mins  25175;     Neuromuscular Gabriella:    -    mins  00831;    Therapeutic Activity:     10     mins  94201;   Gait Training:                 -     mins  14361;   Self Care                       -     mins   11911;  Orthotic fit/train              -     mins  45835;   Dry Needling Tri          __-__ DNTRIAL;   Ultrasound:     -     mins  18822;  Ionto                          -     mins 37248 ;  Self Pay 15                  -       mins   [email or web address]  Self Pay 30                  -      mins  PTSPMIN2      Untimed:  Electrical Stimulation:    -     mins  83391  "( );  Mechanical Traction:    -     mins  86631;   Dry Needling:              __-_ mins 20561, 20560  Paraffin       -  minutes 48672     Timed Treatment:   25   mins   Total Treatment:     45   mins  Kelsi Kirkpatrick, PT, [facility]  Physical Therapist                  \"Parts of this note may be an electronic transcription/translation of spoken language to printed text using the Dragon dictation system  "

## 2024-11-11 ENCOUNTER — TRANSCRIBE ORDERS (OUTPATIENT)
Dept: ADMINISTRATIVE | Facility: HOSPITAL | Age: 74
End: 2024-11-11
Payer: MEDICARE

## 2024-11-11 DIAGNOSIS — D63.1 ANEMIA IN CHRONIC KIDNEY DISEASE (CODE): Primary | ICD-10-CM

## 2024-11-11 DIAGNOSIS — N18.4 CHRONIC RENAL DISEASE, STAGE IV: ICD-10-CM

## 2024-11-12 ENCOUNTER — TELEPHONE (OUTPATIENT)
Age: 74
End: 2024-11-12
Payer: MEDICARE

## 2024-11-12 ENCOUNTER — TELEPHONE (OUTPATIENT)
Dept: PHYSICAL THERAPY | Facility: OTHER | Age: 74
End: 2024-11-12
Payer: MEDICARE

## 2024-11-12 NOTE — TELEPHONE ENCOUNTER
Caller: Baltazar MELO    Relationship: Self    Best call back number: [phone]    What is the best time to reach you: ANY    Who are you requesting to speak with (clinical staff, provider,  specific staff member): RASHEEDA REYES    What was the call regarding: PATIENT DID GET THERE APPROVAL FOR THE IRON INFUSION FROM INSURANCE. THEY ARE CHECKING HER IRON ON NOV 15TH THEN HER DR WILL SCHEDULE THE IRON INFUSION.

## 2024-11-12 NOTE — TELEPHONE ENCOUNTER
PATIENT WOULD LIKE FOR RASHEEDA TO ENTER A CODE AS TO WHY SHE IS UNABLE TO COME TO PHYSICAL THERAPY DUE TO BEING ANEMIC AND BECAUSE SHE IS GOING TO START ON BLOOD INFUSIONS.  GOING TO HAVE ANOTHER BLOOD PANEL DONE ON 11/15 AND THEN SHE CAN START IRON INFUSIONS.  WOULD LIKE FOR RASHEEDA TO CALL HER [phone]

## 2024-11-25 ENCOUNTER — TRANSCRIBE ORDERS (OUTPATIENT)
Dept: ADMINISTRATIVE | Facility: HOSPITAL | Age: 74
End: 2024-11-25
Payer: MEDICARE

## 2024-11-25 DIAGNOSIS — D63.1 ANEMIA IN CHRONIC KIDNEY DISEASE (CODE): Primary | ICD-10-CM

## 2024-11-25 DIAGNOSIS — N18.4 CHRONIC RENAL DISEASE, STAGE IV: ICD-10-CM

## 2024-12-02 RX ORDER — SODIUM CHLORIDE 9 MG/ML
20 INJECTION, SOLUTION INTRAVENOUS ONCE
Status: CANCELLED | OUTPATIENT
Start: 2024-12-02

## 2024-12-05 ENCOUNTER — HOSPITAL ENCOUNTER (OUTPATIENT)
Dept: INFUSION THERAPY | Facility: HOSPITAL | Age: 74
Discharge: HOME | End: 2024-12-05
Payer: MEDICARE

## 2024-12-05 VITALS
SYSTOLIC BLOOD PRESSURE: 142 MMHG | OXYGEN SATURATION: 100 % | HEART RATE: 55 BPM | DIASTOLIC BLOOD PRESSURE: 57 MMHG | RESPIRATION RATE: 18 BRPM | TEMPERATURE: 96.9 F

## 2024-12-05 DIAGNOSIS — D63.8 ANEMIA, CHRONIC DISEASE: Primary | ICD-10-CM

## 2024-12-05 PROCEDURE — 25010000002 FERUMOXYTOL 510 MG/17ML SOLUTION 17 ML VIAL: Performed by: NURSE PRACTITIONER

## 2024-12-05 PROCEDURE — 63710000001 DIPHENHYDRAMINE PER 50 MG: Performed by: NURSE PRACTITIONER

## 2024-12-05 PROCEDURE — 96374 THER/PROPH/DIAG INJ IV PUSH: CPT

## 2024-12-05 RX ORDER — AMLODIPINE BESYLATE 2.5 MG/1
2.5 TABLET ORAL DAILY
COMMUNITY
Start: 2024-10-30 | End: 2025-10-25

## 2024-12-05 RX ORDER — DIPHENHYDRAMINE HCL 25 MG
25 CAPSULE ORAL ONCE
Status: COMPLETED | OUTPATIENT
Start: 2024-12-05 | End: 2024-12-05

## 2024-12-05 RX ORDER — SODIUM CHLORIDE 9 MG/ML
20 INJECTION, SOLUTION INTRAVENOUS ONCE
Status: DISCONTINUED | OUTPATIENT
Start: 2024-12-05 | End: 2024-12-07 | Stop reason: HOSPADM

## 2024-12-05 RX ORDER — SODIUM CHLORIDE 9 MG/ML
20 INJECTION, SOLUTION INTRAVENOUS ONCE
Status: CANCELLED | OUTPATIENT
Start: 2024-12-12

## 2024-12-05 RX ORDER — HYDRALAZINE HYDROCHLORIDE 100 MG/1
100 TABLET, FILM COATED ORAL 3 TIMES DAILY
COMMUNITY

## 2024-12-05 RX ADMIN — DIPHENHYDRAMINE HYDROCHLORIDE 25 MG: 25 CAPSULE ORAL at 09:53

## 2024-12-05 RX ADMIN — FERUMOXYTOL 510 MG: 510 INJECTION INTRAVENOUS at 08:59

## 2024-12-05 NOTE — PROGRESS NOTES
0937 - Patient c/o generalized itching.  No rash or hives noted. Call placed to provider Della Prasad.  See orders.  1015 - Patient reports itching has resolved  1040 - Patient continues to report itching has resolved. Patient D/C'd from [facility] with spouse via personal wheelchair.

## 2024-12-12 ENCOUNTER — HOSPITAL ENCOUNTER (OUTPATIENT)
Dept: INFUSION THERAPY | Facility: HOSPITAL | Age: 74
Discharge: HOME | End: 2024-12-12
Payer: MEDICARE

## 2024-12-12 VITALS
DIASTOLIC BLOOD PRESSURE: 57 MMHG | RESPIRATION RATE: 20 BRPM | OXYGEN SATURATION: 100 % | TEMPERATURE: 96.9 F | SYSTOLIC BLOOD PRESSURE: 145 MMHG | HEART RATE: 58 BPM

## 2024-12-12 DIAGNOSIS — D63.8 ANEMIA, CHRONIC DISEASE: Primary | ICD-10-CM

## 2024-12-12 PROCEDURE — 25010000002 FERUMOXYTOL 510 MG/17ML SOLUTION 17 ML VIAL: Performed by: NURSE PRACTITIONER

## 2024-12-12 PROCEDURE — 63710000001 DIPHENHYDRAMINE PER 50 MG: Performed by: NURSE PRACTITIONER

## 2024-12-12 PROCEDURE — 96374 THER/PROPH/DIAG INJ IV PUSH: CPT

## 2024-12-12 RX ORDER — ACETAMINOPHEN 325 MG/1
650 TABLET ORAL EVERY 6 HOURS PRN
Status: DISCONTINUED | OUTPATIENT
Start: 2024-12-12 | End: 2024-12-14 | Stop reason: HOSPADM

## 2024-12-12 RX ORDER — DIPHENHYDRAMINE HCL 25 MG
25 CAPSULE ORAL ONCE
Status: COMPLETED | OUTPATIENT
Start: 2024-12-12 | End: 2024-12-12

## 2024-12-12 RX ORDER — SODIUM CHLORIDE 9 MG/ML
20 INJECTION, SOLUTION INTRAVENOUS ONCE
Status: CANCELLED | OUTPATIENT
Start: 2024-12-12

## 2024-12-12 RX ORDER — SODIUM CHLORIDE 9 MG/ML
20 INJECTION, SOLUTION INTRAVENOUS ONCE
Status: DISCONTINUED | OUTPATIENT
Start: 2024-12-12 | End: 2024-12-14 | Stop reason: HOSPADM

## 2024-12-12 RX ADMIN — FERUMOXYTOL 510 MG: 510 INJECTION INTRAVENOUS at 09:43

## 2024-12-12 RX ADMIN — ACETAMINOPHEN 650 MG: 325 TABLET ORAL at 09:11

## 2024-12-12 RX ADMIN — DIPHENHYDRAMINE HYDROCHLORIDE 25 MG: 25 CAPSULE ORAL at 09:11

## 2025-03-05 ENCOUNTER — APPOINTMENT (OUTPATIENT)
Dept: WOMENS IMAGING | Facility: HOSPITAL | Age: 75
End: 2025-03-05
Payer: MEDICARE

## 2025-03-07 ENCOUNTER — OFFICE VISIT (OUTPATIENT)
Dept: CARDIOLOGY | Facility: CLINIC | Age: 75
End: 2025-03-07
Payer: MEDICARE

## 2025-03-07 VITALS
SYSTOLIC BLOOD PRESSURE: 142 MMHG | HEIGHT: 64 IN | WEIGHT: 185 LBS | DIASTOLIC BLOOD PRESSURE: 60 MMHG | BODY MASS INDEX: 31.58 KG/M2 | HEART RATE: 67 BPM

## 2025-03-07 DIAGNOSIS — I48.19 PERSISTENT ATRIAL FIBRILLATION: Primary | ICD-10-CM

## 2025-03-07 DIAGNOSIS — I34.0 NONRHEUMATIC MITRAL VALVE REGURGITATION: ICD-10-CM

## 2025-03-07 DIAGNOSIS — I10 PRIMARY HYPERTENSION: ICD-10-CM

## 2025-03-07 PROCEDURE — 99214 OFFICE O/P EST MOD 30 MIN: CPT | Performed by: NURSE PRACTITIONER

## 2025-03-07 PROCEDURE — 93000 ELECTROCARDIOGRAM COMPLETE: CPT | Performed by: NURSE PRACTITIONER

## 2025-03-07 PROCEDURE — 1159F MED LIST DOCD IN RCRD: CPT | Performed by: NURSE PRACTITIONER

## 2025-03-07 PROCEDURE — 3077F SYST BP >= 140 MM HG: CPT | Performed by: NURSE PRACTITIONER

## 2025-03-07 PROCEDURE — 1160F RVW MEDS BY RX/DR IN RCRD: CPT | Performed by: NURSE PRACTITIONER

## 2025-03-07 PROCEDURE — 3078F DIAST BP <80 MM HG: CPT | Performed by: NURSE PRACTITIONER

## 2025-03-07 NOTE — H&P (VIEW-ONLY)
Date of Office Visit: 2025  Encounter Provider: CAROLA Duff  Place of Service: [facility] CARDIOLOGY  Patient Name: Baltazar MELO  :1950    Chief Complaint   Patient presents with    Atrial Fibrillation   :     HPI: Baltazar MELO is a 75 y.o. female patient of Dr. Farah's with hypertension, hyperlipidemia, mitral regurgitation, and atrial fibrillation.  She is no longer anticoagulated secondary to anemia.    Her last echocardiogram was in  at which time she had normal LV systolic function and mild to moderate mitral regurgitation.    She was last seen in the office by Dr. Farah in 2024 at which time she was doing well.  She remained off Eliquis which Dr. Farah felt was reasonable.  No changes are made to her regimen, and she was advised follow-up in 1 year.    From a cardiac standpoint, she has been doing well.  She denies any chest pain, shortness of breath, palpitations, edema, dizziness, or syncope.  She is a little stressed following an abnormal mammogram this week.  Especially given her history of right sided breast cancer.  Reportedly her blood pressures are usually better at home, mostly in the 130s systolic.  She has been working on weight loss.    Past Medical History:   Diagnosis Date    A-fib     Anemia in stage 4 chronic kidney disease     Arthritis     Cancer     Creatinine elevation     Diabetes mellitus     Elevated cholesterol     Health care maintenance     Hyperlipidemia     Hypertension     Obesity     Orthostatic dizziness 2023    PVC (premature ventricular contraction)        Past Surgical History:   Procedure Laterality Date    BACK SURGERY      BREAST CYST EXCISION Left     BREAST LUMPECTOMY Right     BREAST SURGERY Bilateral     Reduction    CHOLECYSTECTOMY      HYSTERECTOMY      LUMBAR DISCECTOMY Left 2019    Procedure: Left Lumbar Three to Lumbar Four and Lumbar Four to Lumbar Five Decompression;  Surgeon:  Abhijit Robertson MD;  Location: [facility] OR;  Service: Neurosurgery       Social History     Socioeconomic History    Marital status:    Tobacco Use    Smoking status: Former     Current packs/day: 0.00     Average packs/day: 0.3 packs/day for 0.5 years (0.1 ttl pk-yrs)     Types: Cigarettes     Start date: 1971     Quit date: 1972     Years since quittin.2    Smokeless tobacco: Never    Tobacco comments:     NO CAFFEINE USE   Vaping Use    Vaping status: Never Used   Substance and Sexual Activity    Alcohol use: No    Drug use: No    Sexual activity: Defer       Family History   Problem Relation Age of Onset    Heart disease Father     Cancer Mother     No Known Problems Maternal Grandmother     No Known Problems Maternal Grandfather     No Known Problems Paternal Grandmother     No Known Problems Paternal Grandfather        Review of Systems   Constitutional: Negative.   Cardiovascular: Negative.  Negative for chest pain, dyspnea on exertion, leg swelling, orthopnea, paroxysmal nocturnal dyspnea and syncope.   Respiratory: Negative.     Hematologic/Lymphatic: Negative for bleeding problem.   Musculoskeletal:  Negative for falls.   Gastrointestinal:  Negative for melena.   Neurological:  Negative for dizziness and light-headedness.       Allergies   Allergen Reactions    Cat Dander Other (See Comments) and Shortness Of Breath    Adhesive Tape Dermatitis and Other (See Comments)     Blisters    Cefaclor Dizziness    Ibuprofen Other (See Comments)     Kidney dysfunction     Penicillins Dizziness    Cats Claw (Uncaria Tomentosa) Other (See Comments)    Levofloxacin Other (See Comments)    Losartan Diarrhea     Diarrhea (higher dose)    Vitamin C Other (See Comments)    Ascorbic Acid & Derivatives Other (See Comments)     Epistaxis    Latex Rash         Current Outpatient Medications:     ACCU-CHEK LEE PLUS test strip, USE 1 STRIP D AS INSTRUCTED, Disp: , Rfl: 5    acetaminophen (TYLENOL) 325  MG tablet, Take 3 tablets by mouth., Disp: , Rfl:     amLODIPine (NORVASC) 2.5 MG tablet, Take 1 tablet by mouth Daily., Disp: , Rfl:     anastrozole (ARIMIDEX) 1 MG tablet,  1 Tab, Oral, Daily, # 90 Tab, 2 Refill(s), Pharmacy: [id number], cm, 09/14/22 12:05:00 EDT, CLINICALHEIGHT, 99.6, kg, 09/14/22 12:05:00 EDT, CLINICALWEIGHT, 165.1, Disp: , Rfl:     aspirin 81 MG chewable tablet, Chew 1 tablet. Pt takes three times a week, Disp: , Rfl:     atorvastatin (LIPITOR) 10 MG tablet, Take 1 tablet by mouth Daily., Disp: , Rfl:     B-D UF III MINI PEN NEEDLES 31G X 5 MM misc, INJECTING QID, Disp: , Rfl: 0    BIOTIN PO, Take 1 tablet by mouth Daily., Disp: , Rfl:     calcium carbonate (OS-MAYCOL) 600 MG tablet, Take 1 tablet by mouth 2 (Two) Times a Day., Disp: , Rfl:     carvedilol (COREG) 25 MG tablet, Take 1 tablet by mouth 2 (Two) Times a Day With Meals., Disp: , Rfl:     Cholecalciferol (Vitamin D3) 125 MCG (5000 UT) tablet dispersible, Take 5,000 Units by mouth Daily., Disp: , Rfl:     Ferrous Fumarate (IRON) 18 MG tablet controlled-release, Take  by mouth 3 (Three) Times a Day., Disp: , Rfl:     fluticasone (FLONASE) 50 MCG/ACT nasal spray, Administer 1 spray into the nostril(s) as directed by provider Daily., Disp: , Rfl:     folic acid (FOLVITE) 800 MCG tablet, Take  by mouth Every Other Day., Disp: , Rfl:     glucose blood test strip, 1 each by Other route Daily., Disp: , Rfl:     hydrALAZINE (APRESOLINE) 100 MG tablet, Take 1 tablet by mouth 3 (Three) Times a Day., Disp: , Rfl:     Iron, Ferrous Sulfate, 325 (65 Fe) MG tablet, Take 65 mg by mouth Every Other Day., Disp: , Rfl:     Lactobacillus Rhamnosus, GG, (PROBIOTIC COLIC) liquid, Take  by mouth., Disp: , Rfl:     losartan (COZAAR) 25 MG tablet, Take 1 tablet by mouth Daily., Disp: , Rfl:     Multiple Vitamins-Minerals (MULTIVITAMIN PO), Take 1 tablet by mouth Daily., Disp: , Rfl:     ONETOUCH DELICA LANCETS 33G misc, USE AS DIRECTED TO CHECK  "BLOOD SUGAR ONCE DAILY, Disp: , Rfl:     Polyethyl Glyc-Propyl Glyc PF (Systane Preservative Free) 0.4-0.3 % solution ophthalmic solution, Administer 1 drop to both eyes 2 (Two) Times a Day., Disp: , Rfl:     polyethylene glycol (MIRALAX) pack packet, Take 17 g by mouth Daily., Disp: , Rfl:     sennosides-docusate sodium (SENOKOT-S) 8.6-50 MG tablet, Take 2 tablets by mouth Every Night., Disp: , Rfl:     sodium bicarbonate 650 MG tablet, Take 1 tablet by mouth Daily., Disp: , Rfl:     traMADol (ULTRAM) 50 MG tablet, Take 1 tablet by mouth Every 6 (Six) Hours As Needed for Moderate Pain., Disp: , Rfl:     vitamin B-12 (CYANOCOBALAMIN) 1000 MCG tablet, Take 1 tablet by mouth Daily., Disp: , Rfl:       Objective:     Vitals:    03/07/25 0935   BP: 142/60   Pulse: 67   Weight: 83.9 kg (185 lb)   Height: 162.6 cm (64\")     Body mass index is 31.76 kg/m².    PHYSICAL EXAM:    Pulmonary:      Effort: Pulmonary effort is normal.      Breath sounds: Normal breath sounds.   Cardiovascular:      Normal rate. Regular rhythm.      Murmurs: There is a high frequency blowing holosystolic murmur at the apex.      No gallop.  No click. No rub.   Pulses:     Intact distal pulses.           ECG 12 Lead    Date/Time: 3/7/2025 9:46 AM  Performed by: Gely Valdes APRN    Authorized by: Gely Valdes APRN  Comparison: compared with previous ECG from 2/13/2024  Similar to previous ECG  Rhythm: atrial fibrillation  Ectopy: unifocal PVCs  Rate: normal  BPM: 67          Assessment:       Diagnosis Plan   1. Persistent atrial fibrillation  ECG 12 Lead      2. Nonrheumatic mitral valve regurgitation        3. Primary hypertension          Orders Placed This Encounter   Procedures    ECG 12 Lead     This order was created via procedure documentation     Order Specific Question:   Release to patient     Answer:   Routine Release [[id number]]          Plan:       1.  Persistent atrial fibrillation.  She is rate controlled with " carvedilol.  She is no longer anticoagulated secondary to anemia.      2.  Mitral regurgitation.  Echocardiogram in 2019 demonstrated mild to moderate MR.  She is asymptomatic.  We should probably consider repeating an echocardiogram.  Will discuss with Dr. Farah.      3.  Hypertension.  Her blood pressure is a little high today but she reports adequate control at home.  Continue current regimen including amlodipine, carvedilol, hydralazine, and losartan.      I think she is doing well.  I am not making any changes, and she will follow-up with Dr. Farah in 1 year      As always, it has been a pleasure to participate in your patient's care.      Sincerely,         CAROLA Echveerria

## 2025-03-07 NOTE — PROGRESS NOTES
Date of Office Visit: 2025  Encounter Provider: CAROLA Duff  Place of Service: [facility] CARDIOLOGY  Patient Name: Baltazar MELO  :1950    Chief Complaint   Patient presents with    Atrial Fibrillation   :     HPI: Baltazar MELO is a 75 y.o. female patient of Dr. Farah's with hypertension, hyperlipidemia, mitral regurgitation, and atrial fibrillation.  She is no longer anticoagulated secondary to anemia.    Her last echocardiogram was in  at which time she had normal LV systolic function and mild to moderate mitral regurgitation.    She was last seen in the office by Dr. Farah in 2024 at which time she was doing well.  She remained off Eliquis which Dr. Farah felt was reasonable.  No changes are made to her regimen, and she was advised follow-up in 1 year.    From a cardiac standpoint, she has been doing well.  She denies any chest pain, shortness of breath, palpitations, edema, dizziness, or syncope.  She is a little stressed following an abnormal mammogram this week.  Especially given her history of right sided breast cancer.  Reportedly her blood pressures are usually better at home, mostly in the 130s systolic.  She has been working on weight loss.    Past Medical History:   Diagnosis Date    A-fib     Anemia in stage 4 chronic kidney disease     Arthritis     Cancer     Creatinine elevation     Diabetes mellitus     Elevated cholesterol     Health care maintenance     Hyperlipidemia     Hypertension     Obesity     Orthostatic dizziness 2023    PVC (premature ventricular contraction)        Past Surgical History:   Procedure Laterality Date    BACK SURGERY      BREAST CYST EXCISION Left     BREAST LUMPECTOMY Right     BREAST SURGERY Bilateral     Reduction    CHOLECYSTECTOMY      HYSTERECTOMY      LUMBAR DISCECTOMY Left 2019    Procedure: Left Lumbar Three to Lumbar Four and Lumbar Four to Lumbar Five Decompression;  Surgeon:  Abhijit Robertson MD;  Location: [facility] OR;  Service: Neurosurgery       Social History     Socioeconomic History    Marital status:    Tobacco Use    Smoking status: Former     Current packs/day: 0.00     Average packs/day: 0.3 packs/day for 0.5 years (0.1 ttl pk-yrs)     Types: Cigarettes     Start date: 1971     Quit date: 1972     Years since quittin.2    Smokeless tobacco: Never    Tobacco comments:     NO CAFFEINE USE   Vaping Use    Vaping status: Never Used   Substance and Sexual Activity    Alcohol use: No    Drug use: No    Sexual activity: Defer       Family History   Problem Relation Age of Onset    Heart disease Father     Cancer Mother     No Known Problems Maternal Grandmother     No Known Problems Maternal Grandfather     No Known Problems Paternal Grandmother     No Known Problems Paternal Grandfather        Review of Systems   Constitutional: Negative.   Cardiovascular: Negative.  Negative for chest pain, dyspnea on exertion, leg swelling, orthopnea, paroxysmal nocturnal dyspnea and syncope.   Respiratory: Negative.     Hematologic/Lymphatic: Negative for bleeding problem.   Musculoskeletal:  Negative for falls.   Gastrointestinal:  Negative for melena.   Neurological:  Negative for dizziness and light-headedness.       Allergies   Allergen Reactions    Cat Dander Other (See Comments) and Shortness Of Breath    Adhesive Tape Dermatitis and Other (See Comments)     Blisters    Cefaclor Dizziness    Ibuprofen Other (See Comments)     Kidney dysfunction     Penicillins Dizziness    Cats Claw (Uncaria Tomentosa) Other (See Comments)    Levofloxacin Other (See Comments)    Losartan Diarrhea     Diarrhea (higher dose)    Vitamin C Other (See Comments)    Ascorbic Acid & Derivatives Other (See Comments)     Epistaxis    Latex Rash         Current Outpatient Medications:     ACCU-CHEK LEE PLUS test strip, USE 1 STRIP D AS INSTRUCTED, Disp: , Rfl: 5    acetaminophen (TYLENOL) 325  MG tablet, Take 3 tablets by mouth., Disp: , Rfl:     amLODIPine (NORVASC) 2.5 MG tablet, Take 1 tablet by mouth Daily., Disp: , Rfl:     anastrozole (ARIMIDEX) 1 MG tablet,  1 Tab, Oral, Daily, # 90 Tab, 2 Refill(s), Pharmacy: [id number], cm, 09/14/22 12:05:00 EDT, CLINICALHEIGHT, 99.6, kg, 09/14/22 12:05:00 EDT, CLINICALWEIGHT, 165.1, Disp: , Rfl:     aspirin 81 MG chewable tablet, Chew 1 tablet. Pt takes three times a week, Disp: , Rfl:     atorvastatin (LIPITOR) 10 MG tablet, Take 1 tablet by mouth Daily., Disp: , Rfl:     B-D UF III MINI PEN NEEDLES 31G X 5 MM misc, INJECTING QID, Disp: , Rfl: 0    BIOTIN PO, Take 1 tablet by mouth Daily., Disp: , Rfl:     calcium carbonate (OS-MAYCOL) 600 MG tablet, Take 1 tablet by mouth 2 (Two) Times a Day., Disp: , Rfl:     carvedilol (COREG) 25 MG tablet, Take 1 tablet by mouth 2 (Two) Times a Day With Meals., Disp: , Rfl:     Cholecalciferol (Vitamin D3) 125 MCG (5000 UT) tablet dispersible, Take 5,000 Units by mouth Daily., Disp: , Rfl:     Ferrous Fumarate (IRON) 18 MG tablet controlled-release, Take  by mouth 3 (Three) Times a Day., Disp: , Rfl:     fluticasone (FLONASE) 50 MCG/ACT nasal spray, Administer 1 spray into the nostril(s) as directed by provider Daily., Disp: , Rfl:     folic acid (FOLVITE) 800 MCG tablet, Take  by mouth Every Other Day., Disp: , Rfl:     glucose blood test strip, 1 each by Other route Daily., Disp: , Rfl:     hydrALAZINE (APRESOLINE) 100 MG tablet, Take 1 tablet by mouth 3 (Three) Times a Day., Disp: , Rfl:     Iron, Ferrous Sulfate, 325 (65 Fe) MG tablet, Take 65 mg by mouth Every Other Day., Disp: , Rfl:     Lactobacillus Rhamnosus, GG, (PROBIOTIC COLIC) liquid, Take  by mouth., Disp: , Rfl:     losartan (COZAAR) 25 MG tablet, Take 1 tablet by mouth Daily., Disp: , Rfl:     Multiple Vitamins-Minerals (MULTIVITAMIN PO), Take 1 tablet by mouth Daily., Disp: , Rfl:     ONETOUCH DELICA LANCETS 33G misc, USE AS DIRECTED TO CHECK  "BLOOD SUGAR ONCE DAILY, Disp: , Rfl:     Polyethyl Glyc-Propyl Glyc PF (Systane Preservative Free) 0.4-0.3 % solution ophthalmic solution, Administer 1 drop to both eyes 2 (Two) Times a Day., Disp: , Rfl:     polyethylene glycol (MIRALAX) pack packet, Take 17 g by mouth Daily., Disp: , Rfl:     sennosides-docusate sodium (SENOKOT-S) 8.6-50 MG tablet, Take 2 tablets by mouth Every Night., Disp: , Rfl:     sodium bicarbonate 650 MG tablet, Take 1 tablet by mouth Daily., Disp: , Rfl:     traMADol (ULTRAM) 50 MG tablet, Take 1 tablet by mouth Every 6 (Six) Hours As Needed for Moderate Pain., Disp: , Rfl:     vitamin B-12 (CYANOCOBALAMIN) 1000 MCG tablet, Take 1 tablet by mouth Daily., Disp: , Rfl:       Objective:     Vitals:    03/07/25 0935   BP: 142/60   Pulse: 67   Weight: 83.9 kg (185 lb)   Height: 162.6 cm (64\")     Body mass index is 31.76 kg/m².    PHYSICAL EXAM:    Pulmonary:      Effort: Pulmonary effort is normal.      Breath sounds: Normal breath sounds.   Cardiovascular:      Normal rate. Regular rhythm.      Murmurs: There is a high frequency blowing holosystolic murmur at the apex.      No gallop.  No click. No rub.   Pulses:     Intact distal pulses.           ECG 12 Lead    Date/Time: 3/7/2025 9:46 AM  Performed by: Gely Valdes APRN    Authorized by: Gely Valdes APRN  Comparison: compared with previous ECG from 2/13/2024  Similar to previous ECG  Rhythm: atrial fibrillation  Ectopy: unifocal PVCs  Rate: normal  BPM: 67          Assessment:       Diagnosis Plan   1. Persistent atrial fibrillation  ECG 12 Lead      2. Nonrheumatic mitral valve regurgitation        3. Primary hypertension          Orders Placed This Encounter   Procedures    ECG 12 Lead     This order was created via procedure documentation     Order Specific Question:   Release to patient     Answer:   Routine Release [[id number]]          Plan:       1.  Persistent atrial fibrillation.  She is rate controlled with " carvedilol.  She is no longer anticoagulated secondary to anemia.      2.  Mitral regurgitation.  Echocardiogram in 2019 demonstrated mild to moderate MR.  She is asymptomatic.  We should probably consider repeating an echocardiogram.  Will discuss with Dr. Farah.      3.  Hypertension.  Her blood pressure is a little high today but she reports adequate control at home.  Continue current regimen including amlodipine, carvedilol, hydralazine, and losartan.      I think she is doing well.  I am not making any changes, and she will follow-up with Dr. Farah in 1 year      As always, it has been a pleasure to participate in your patient's care.      Sincerely,         CAROLA Echeverria

## 2025-03-10 ENCOUNTER — TELEPHONE (OUTPATIENT)
Dept: CARDIOLOGY | Facility: CLINIC | Age: 75
End: 2025-03-10
Payer: MEDICARE

## 2025-03-10 DIAGNOSIS — I34.0 NONRHEUMATIC MITRAL VALVE REGURGITATION: Primary | ICD-10-CM

## 2025-03-10 NOTE — TELEPHONE ENCOUNTER
----- Message from Harjit Farah sent at 3/9/2025  7:38 AM EDT -----  Regarding: RE:  Yes  ----- Message -----  From: Gely Valdes APRN  Sent: 3/7/2025  10:03 AM EDT  To: Harjit Farah MD    She had mild-mod MR in 2019. No echo since then. She is asymptomatic. But do you think we should repeat an echo?

## 2025-03-10 NOTE — TELEPHONE ENCOUNTER
Please let her know I discussed with Dr. Farah.  We are going to repeat an echocardiogram for reassessment of her mitral valve.    Please order echocardiogram

## 2025-03-10 NOTE — TELEPHONE ENCOUNTER
Notified patient of recommendations. Patient verbalized understanding. Please order echo so it can be scheduled.     Joy Harding RN  Triage [facility]

## 2025-03-12 ENCOUNTER — APPOINTMENT (OUTPATIENT)
Dept: WOMENS IMAGING | Facility: HOSPITAL | Age: 75
End: 2025-03-12
Payer: MEDICARE

## 2025-03-12 PROCEDURE — 77063 BREAST TOMOSYNTHESIS BI: CPT | Performed by: RADIOLOGY

## 2025-03-12 PROCEDURE — 77067 SCR MAMMO BI INCL CAD: CPT | Performed by: RADIOLOGY

## 2025-03-19 ENCOUNTER — TRANSCRIBE ORDERS (OUTPATIENT)
Dept: CARDIOLOGY | Facility: CLINIC | Age: 75
End: 2025-03-19
Payer: MEDICARE

## 2025-03-19 ENCOUNTER — HOSPITAL ENCOUNTER (OUTPATIENT)
Dept: CARDIOLOGY | Facility: HOSPITAL | Age: 75
Discharge: HOME | End: 2025-03-19
Admitting: NURSE PRACTITIONER
Payer: MEDICARE

## 2025-03-19 ENCOUNTER — TELEPHONE (OUTPATIENT)
Dept: CARDIOLOGY | Facility: CLINIC | Age: 75
End: 2025-03-19
Payer: MEDICARE

## 2025-03-19 VITALS
BODY MASS INDEX: 31.58 KG/M2 | HEIGHT: 64 IN | DIASTOLIC BLOOD PRESSURE: 68 MMHG | WEIGHT: 184.97 LBS | HEART RATE: 70 BPM | SYSTOLIC BLOOD PRESSURE: 136 MMHG

## 2025-03-19 DIAGNOSIS — Z01.810 PRE-OPERATIVE CARDIOVASCULAR EXAMINATION: ICD-10-CM

## 2025-03-19 DIAGNOSIS — Z13.6 SCREENING FOR ISCHEMIC HEART DISEASE: Primary | ICD-10-CM

## 2025-03-19 DIAGNOSIS — I27.20 PULMONARY HYPERTENSION: Primary | ICD-10-CM

## 2025-03-19 DIAGNOSIS — I34.0 NONRHEUMATIC MITRAL VALVE REGURGITATION: ICD-10-CM

## 2025-03-19 LAB
AORTIC ARCH: 2 CM
AORTIC DIMENSIONLESS INDEX: 0.31 (DI)
ASCENDING AORTA: 2.7 CM
AV MEAN PRESS GRAD SYS DOP V1V2: 12.1 MMHG
AV VMAX SYS DOP: 264.7 CM/SEC
BH CV ECHO LEFT VENTRICLE GLOBAL LONGITUDINAL STRAIN: -19.2 %
BH CV ECHO MEAS - ACS: 1.3 CM
BH CV ECHO MEAS - AO MAX PG: 28 MMHG
BH CV ECHO MEAS - AO ROOT DIAM: 2.7 CM
BH CV ECHO MEAS - AO V2 VTI: 71.4 CM
BH CV ECHO MEAS - AVA(I,D): 0.95 CM2
BH CV ECHO MEAS - EDV(CUBED): 157.5 ML
BH CV ECHO MEAS - EDV(MOD-SP2): 115 ML
BH CV ECHO MEAS - EDV(MOD-SP4): 118 ML
BH CV ECHO MEAS - EF(MOD-SP2): 58.3 %
BH CV ECHO MEAS - EF(MOD-SP4): 55.1 %
BH CV ECHO MEAS - ESV(CUBED): 60.2 ML
BH CV ECHO MEAS - ESV(MOD-SP2): 48 ML
BH CV ECHO MEAS - ESV(MOD-SP4): 53 ML
BH CV ECHO MEAS - FS: 27.4 %
BH CV ECHO MEAS - IVS/LVPW: 0.85 CM
BH CV ECHO MEAS - IVSD: 1.1 CM
BH CV ECHO MEAS - LAT PEAK E' VEL: 14.6 CM/SEC
BH CV ECHO MEAS - LV DIASTOLIC VOL/BSA (35-75): 62.4 CM2
BH CV ECHO MEAS - LV MASS(C)D: 264.4 GRAMS
BH CV ECHO MEAS - LV MAX PG: 2.8 MMHG
BH CV ECHO MEAS - LV MEAN PG: 2 MMHG
BH CV ECHO MEAS - LV SYSTOLIC VOL/BSA (12-30): 28 CM2
BH CV ECHO MEAS - LV V1 MAX: 83.9 CM/SEC
BH CV ECHO MEAS - LV V1 VTI: 22.5 CM
BH CV ECHO MEAS - LVIDD: 5.4 CM
BH CV ECHO MEAS - LVIDS: 3.9 CM
BH CV ECHO MEAS - LVOT AREA: 3 CM2
BH CV ECHO MEAS - LVOT DIAM: 1.96 CM
BH CV ECHO MEAS - LVPWD: 1.3 CM
BH CV ECHO MEAS - MED PEAK E' VEL: 7.9 CM/SEC
BH CV ECHO MEAS - MV DEC SLOPE: 612 CM/SEC2
BH CV ECHO MEAS - MV DEC TIME: 0.15 SEC
BH CV ECHO MEAS - MV E MAX VEL: 123.8 CM/SEC
BH CV ECHO MEAS - MV MAX PG: 9.4 MMHG
BH CV ECHO MEAS - MV MEAN PG: 3.2 MMHG
BH CV ECHO MEAS - MV P1/2T: 75.2 MSEC
BH CV ECHO MEAS - MV V2 VTI: 39.4 CM
BH CV ECHO MEAS - MVA(P1/2T): 2.9 CM2
BH CV ECHO MEAS - MVA(VTI): 1.71 CM2
BH CV ECHO MEAS - PA ACC TIME: 0.16 SEC
BH CV ECHO MEAS - PA V2 MAX: 118.1 CM/SEC
BH CV ECHO MEAS - PULM DIAS VEL: 91.9 CM/SEC
BH CV ECHO MEAS - PULM S/D: 0.77
BH CV ECHO MEAS - PULM SYS VEL: 70.5 CM/SEC
BH CV ECHO MEAS - QP/QS: 0.72
BH CV ECHO MEAS - RAP SYSTOLE: 15 MMHG
BH CV ECHO MEAS - RV MAX PG: 0.75 MMHG
BH CV ECHO MEAS - RV V1 MAX: 43.3 CM/SEC
BH CV ECHO MEAS - RV V1 VTI: 11 CM
BH CV ECHO MEAS - RVOT DIAM: 2.38 CM
BH CV ECHO MEAS - RVSP: 84.6 MMHG
BH CV ECHO MEAS - SUP REN AO DIAM: 2.6 CM
BH CV ECHO MEAS - SV(LVOT): 67.6 ML
BH CV ECHO MEAS - SV(MOD-SP2): 67 ML
BH CV ECHO MEAS - SV(MOD-SP4): 65 ML
BH CV ECHO MEAS - SV(RVOT): 49 ML
BH CV ECHO MEAS - SVI(LVOT): 35.7 ML/M2
BH CV ECHO MEAS - SVI(MOD-SP2): 35.4 ML/M2
BH CV ECHO MEAS - SVI(MOD-SP4): 34.4 ML/M2
BH CV ECHO MEAS - TAPSE (>1.6): 1.8 CM
BH CV ECHO MEAS - TR MAX PG: 69.6 MMHG
BH CV ECHO MEAS - TR MAX VEL: 417.1 CM/SEC
BH CV ECHO MEASUREMENTS AVERAGE E/E' RATIO: 11
BH CV XLRA - RV BASE: 3.5 CM
BH CV XLRA - RV LENGTH: 8.1 CM
BH CV XLRA - RV MID: 2.8 CM
BH CV XLRA - TDI S': 13.2 CM/SEC
LEFT ATRIUM VOLUME INDEX: 49.7 ML/M2
LV EF BIPLANE MOD: 57.6 %
SINUS: 2.7 CM
STJ: 1.93 CM

## 2025-03-19 PROCEDURE — 93306 TTE W/DOPPLER COMPLETE: CPT

## 2025-03-19 PROCEDURE — 93356 MYOCRD STRAIN IMG SPCKL TRCK: CPT

## 2025-03-19 NOTE — TELEPHONE ENCOUNTER
Echocardiogram reviewed with Dr. Farah.  We are going to set her up for a right heart catheterization.  I spoke with the patient regarding this.    Please schedule right heart catheterization with WD

## 2025-03-20 ENCOUNTER — TELEPHONE (OUTPATIENT)
Dept: MAMMOGRAPHY | Facility: HOSPITAL | Age: 75
End: 2025-03-20
Payer: MEDICARE

## 2025-03-20 NOTE — TELEPHONE ENCOUNTER
----- Message from Gely Valdes sent at 3/20/2025  8:14 AM EDT -----  Regarding: RE: Blood Thinner Hold Request  She may hold the aspirin. Resume at surgeon's discretion.  ----- Message -----  From: Lonny Zazueta RN  Sent: 3/20/2025   8:08 AM EDT  To: Jessy Wilder RN; CAROLA More#  Subject: Blood Thinner Hold Request                       Petrona Hong,Patient of yours Baltazar Jacobsen ( 54) has upcoming Stereotactic Biopsy of the Left Breast scheduled for 3/26/25. It looks like patient takes Aspirin 81 mg three times a week. Per our Radiologist's protocol, patient is to hold Aspirin for 5-7 days prior to procedure. Please advise if this is okay for patient to hold, and if so, when patient can restart after biopsy. Thank you,Lonny, RN

## 2025-03-26 ENCOUNTER — HOSPITAL ENCOUNTER (OUTPATIENT)
Dept: MAMMOGRAPHY | Facility: HOSPITAL | Age: 75
Discharge: HOME | End: 2025-03-26
Payer: MEDICARE

## 2025-03-26 VITALS
HEIGHT: 65 IN | HEART RATE: 72 BPM | RESPIRATION RATE: 16 BRPM | OXYGEN SATURATION: 100 % | DIASTOLIC BLOOD PRESSURE: 91 MMHG | BODY MASS INDEX: 30.66 KG/M2 | TEMPERATURE: 96.8 F | WEIGHT: 184 LBS | SYSTOLIC BLOOD PRESSURE: 189 MMHG

## 2025-03-26 DIAGNOSIS — R92.8 ABNORMAL MAMMOGRAM: ICD-10-CM

## 2025-03-26 PROCEDURE — 25010000002 LIDOCAINE 1 % SOLUTION: Performed by: STUDENT IN AN ORGANIZED HEALTH CARE EDUCATION/TRAINING PROGRAM

## 2025-03-26 PROCEDURE — A4648 IMPLANTABLE TISSUE MARKER: HCPCS

## 2025-03-26 PROCEDURE — 25010000002 LIDOCAINE-EPINEPHRINE (PF) 1 %-1:200000 SOLUTION: Performed by: STUDENT IN AN ORGANIZED HEALTH CARE EDUCATION/TRAINING PROGRAM

## 2025-03-26 RX ORDER — LIDOCAINE HYDROCHLORIDE 10 MG/ML
1 INJECTION, SOLUTION INFILTRATION; PERINEURAL ONCE
Status: COMPLETED | OUTPATIENT
Start: 2025-03-26 | End: 2025-03-26

## 2025-03-26 RX ADMIN — LIDOCAINE HYDROCHLORIDE 10 ML: 10; .005 INJECTION, SOLUTION EPIDURAL; INFILTRATION; INTRACAUDAL; PERINEURAL at 12:46

## 2025-03-26 RX ADMIN — Medication 1 ML: at 12:46

## 2025-03-26 NOTE — NURSING NOTE
Patient returned from biopsy with linear skin tear at the fold under her left breast. No complaints of pain. Patient states she will treat at home with neosporin, gauze and cornstarch. She will call us if she has any issues or if the area appears to be infected.  Biopsy site to left  breast clear with Dermabond dry and intact. No firmness or swelling noted at or around biopsy site. Denies pain. Ice pack with protective covering applied to biopsy site. Discharge instructions discussed with understanding voiced by patient. Copies provided to patient. No distress noted. To home via personal vehicle.

## 2025-03-27 ENCOUNTER — LAB (OUTPATIENT)
Facility: HOSPITAL | Age: 75
End: 2025-03-27
Payer: MEDICARE

## 2025-03-27 ENCOUNTER — TELEPHONE (OUTPATIENT)
Dept: MAMMOGRAPHY | Facility: HOSPITAL | Age: 75
End: 2025-03-27
Payer: MEDICARE

## 2025-03-27 DIAGNOSIS — Z01.810 PRE-OPERATIVE CARDIOVASCULAR EXAMINATION: ICD-10-CM

## 2025-03-27 DIAGNOSIS — Z13.6 SCREENING FOR ISCHEMIC HEART DISEASE: ICD-10-CM

## 2025-03-27 LAB
ANION GAP SERPL CALCULATED.3IONS-SCNC: 12.2 MMOL/L (ref 5–15)
BASOPHILS # BLD AUTO: 0.01 10*3/MM3 (ref 0–0.2)
BASOPHILS NFR BLD AUTO: 0.4 % (ref 0–1.5)
BUN SERPL-MCNC: 37 MG/DL (ref 8–23)
BUN/CREAT SERPL: 17.5 (ref 7–25)
CALCIUM SPEC-SCNC: 9.7 MG/DL (ref 8.6–10.5)
CHLORIDE SERPL-SCNC: 104 MMOL/L (ref 98–107)
CO2 SERPL-SCNC: 22.8 MMOL/L (ref 22–29)
CREAT SERPL-MCNC: 2.11 MG/DL (ref 0.57–1)
DEPRECATED RDW RBC AUTO: 40.2 FL (ref 37–54)
EGFRCR SERPLBLD CKD-EPI 2021: 24.3 ML/MIN/1.73
EOSINOPHIL # BLD AUTO: 0.07 10*3/MM3 (ref 0–0.4)
EOSINOPHIL NFR BLD AUTO: 2.6 % (ref 0.3–6.2)
ERYTHROCYTE [DISTWIDTH] IN BLOOD BY AUTOMATED COUNT: 12.4 % (ref 12.3–15.4)
GLUCOSE SERPL-MCNC: 108 MG/DL (ref 65–99)
HCT VFR BLD AUTO: 26.8 % (ref 34–46.6)
HGB BLD-MCNC: 7.8 G/DL (ref 12–15.9)
IMM GRANULOCYTES # BLD AUTO: 0.01 10*3/MM3 (ref 0–0.05)
IMM GRANULOCYTES NFR BLD AUTO: 0.4 % (ref 0–0.5)
LYMPHOCYTES # BLD AUTO: 0.38 10*3/MM3 (ref 0.7–3.1)
LYMPHOCYTES NFR BLD AUTO: 13.9 % (ref 19.6–45.3)
MCH RBC QN AUTO: 26 PG (ref 26.6–33)
MCHC RBC AUTO-ENTMCNC: 29.1 G/DL (ref 31.5–35.7)
MCV RBC AUTO: 89.3 FL (ref 79–97)
MONOCYTES # BLD AUTO: 0.37 10*3/MM3 (ref 0.1–0.9)
MONOCYTES NFR BLD AUTO: 13.6 % (ref 5–12)
NEUTROPHILS NFR BLD AUTO: 1.89 10*3/MM3 (ref 1.7–7)
NEUTROPHILS NFR BLD AUTO: 69.1 % (ref 42.7–76)
NRBC BLD AUTO-RTO: 0 /100 WBC (ref 0–0.2)
PLATELET # BLD AUTO: 209 10*3/MM3 (ref 140–450)
PMV BLD AUTO: 11.8 FL (ref 6–12)
POTASSIUM SERPL-SCNC: 4.1 MMOL/L (ref 3.5–5.2)
RBC # BLD AUTO: 3 10*6/MM3 (ref 3.77–5.28)
SODIUM SERPL-SCNC: 139 MMOL/L (ref 136–145)
WBC NRBC COR # BLD AUTO: 2.73 10*3/MM3 (ref 3.4–10.8)

## 2025-03-27 PROCEDURE — 80048 BASIC METABOLIC PNL TOTAL CA: CPT

## 2025-03-27 PROCEDURE — 36415 COLL VENOUS BLD VENIPUNCTURE: CPT

## 2025-03-27 PROCEDURE — 85025 COMPLETE CBC W/AUTO DIFF WBC: CPT

## 2025-03-27 NOTE — TELEPHONE ENCOUNTER
Post biopsy call: Doing well. The skin tear remains the same and is sore. She will keep a watch on it and will start drying it out tomorrow using cornstarch.

## 2025-03-31 ENCOUNTER — HOSPITAL ENCOUNTER (OUTPATIENT)
Facility: HOSPITAL | Age: 75
Setting detail: HOSPITAL OUTPATIENT SURGERY
Discharge: HOME | End: 2025-03-31
Attending: INTERNAL MEDICINE | Admitting: INTERNAL MEDICINE
Payer: MEDICARE

## 2025-03-31 VITALS
HEART RATE: 71 BPM | SYSTOLIC BLOOD PRESSURE: 161 MMHG | OXYGEN SATURATION: 98 % | HEIGHT: 65 IN | TEMPERATURE: 97.6 F | RESPIRATION RATE: 18 BRPM | BODY MASS INDEX: 29.49 KG/M2 | DIASTOLIC BLOOD PRESSURE: 60 MMHG | WEIGHT: 177 LBS

## 2025-03-31 DIAGNOSIS — I27.20 PULMONARY HYPERTENSION: ICD-10-CM

## 2025-03-31 LAB
CYTO UR: NORMAL
LAB AP CASE REPORT: NORMAL
LAB AP SPECIAL STAINS: NORMAL
LAB AP SYNOPTIC CHECKLIST: NORMAL
PATH REPORT.FINAL DX SPEC: NORMAL
PATH REPORT.GROSS SPEC: NORMAL

## 2025-03-31 PROCEDURE — 25010000002 LIDOCAINE 2% SOLUTION: Performed by: INTERNAL MEDICINE

## 2025-03-31 PROCEDURE — 82810 BLOOD GASES O2 SAT ONLY: CPT

## 2025-03-31 PROCEDURE — 93451 RIGHT HEART CATH: CPT | Performed by: INTERNAL MEDICINE

## 2025-03-31 PROCEDURE — C1769 GUIDE WIRE: HCPCS | Performed by: INTERNAL MEDICINE

## 2025-03-31 PROCEDURE — C1894 INTRO/SHEATH, NON-LASER: HCPCS | Performed by: INTERNAL MEDICINE

## 2025-03-31 PROCEDURE — 82803 BLOOD GASES ANY COMBINATION: CPT

## 2025-03-31 PROCEDURE — 85018 HEMOGLOBIN: CPT

## 2025-03-31 PROCEDURE — 85014 HEMATOCRIT: CPT

## 2025-03-31 RX ORDER — SODIUM CHLORIDE 0.9 % (FLUSH) 0.9 %
10 SYRINGE (ML) INJECTION AS NEEDED
Status: DISCONTINUED | OUTPATIENT
Start: 2025-03-31 | End: 2025-03-31 | Stop reason: HOSPADM

## 2025-03-31 RX ORDER — SODIUM CHLORIDE 9 MG/ML
40 INJECTION, SOLUTION INTRAVENOUS AS NEEDED
Status: DISCONTINUED | OUTPATIENT
Start: 2025-03-31 | End: 2025-03-31 | Stop reason: HOSPADM

## 2025-03-31 RX ORDER — LOSARTAN POTASSIUM 50 MG/1
50 TABLET ORAL DAILY
Qty: 90 TABLET | Refills: 3 | Status: SHIPPED | OUTPATIENT
Start: 2025-03-31

## 2025-03-31 RX ORDER — ACETAMINOPHEN 325 MG/1
650 TABLET ORAL EVERY 4 HOURS PRN
Status: DISCONTINUED | OUTPATIENT
Start: 2025-03-31 | End: 2025-03-31 | Stop reason: HOSPADM

## 2025-03-31 RX ORDER — FUROSEMIDE 40 MG/1
40 TABLET ORAL DAILY
Qty: 90 TABLET | Refills: 3 | Status: SHIPPED | OUTPATIENT
Start: 2025-03-31

## 2025-03-31 RX ORDER — SODIUM CHLORIDE 9 MG/ML
75 INJECTION, SOLUTION INTRAVENOUS CONTINUOUS
Status: DISCONTINUED | OUTPATIENT
Start: 2025-03-31 | End: 2025-03-31 | Stop reason: HOSPADM

## 2025-03-31 RX ORDER — LIDOCAINE HYDROCHLORIDE 20 MG/ML
INJECTION, SOLUTION INFILTRATION; PERINEURAL
Status: DISCONTINUED | OUTPATIENT
Start: 2025-03-31 | End: 2025-03-31 | Stop reason: HOSPADM

## 2025-03-31 RX ORDER — SODIUM CHLORIDE 0.9 % (FLUSH) 0.9 %
10 SYRINGE (ML) INJECTION EVERY 12 HOURS SCHEDULED
Status: DISCONTINUED | OUTPATIENT
Start: 2025-03-31 | End: 2025-03-31 | Stop reason: HOSPADM

## 2025-03-31 NOTE — INTERVAL H&P NOTE
Pulmonary hypertension noted on her echo.  Referred for right heart cath.  H&P reviewed. The patient was examined and there are no changes to the H&P. I have explained the risks and benefits of the procedure to the patient.  The patient understands and agrees to proceed

## 2025-03-31 NOTE — Clinical Note
Hemostasis started on the left brachial vein. Manual pressure applied to vessel. Manual pressure was held by livier cao. Manual pressure was held for 5 min. Hemostasis achieved successfully. Closure device additional comment: 4x4 tegaderm

## 2025-03-31 NOTE — DISCHARGE INSTRUCTIONS
[facility]  [address] [place] [address]    Right Heart Cath After Care    Refer to this sheet in the next few weeks. These instructions provide you with information on caring for yourself after your procedure. Your caregiver may also give you more specific instructions. Your treatment has been planned according to current medical practices, but problems sometimes occur. Call your caregiver if you have any problems or questions after your procedure.    Home Care Instructions:  You may shower the day after the procedure. Remove the bandage (dressing) and gently wash the site with plain soap and water. Gently pat the site dry. You may apply a band aid daily for 2 days if desired.    Do not apply powder or lotion to the site until the site is completely healed.  Do not submerge the affected site in water until the site is completely healed.   No heavy lifting today.   Inspect the site at least twice daily. You may notice some bruising at the site..     If you received sedation a responsible adult should be with you for the first 24 hours after you arrive home. Do not make any important legal decisions or sign legal papers for 24 hours.  Do not drink alcohol for 24 hours.  Do not operate machinery or power tools for 24 hours. You may drive 24 hours after the procedure unless otherwise instructed by your caregiver.        Call Your Doctor if:   You have unusual pain at the puncture site.  You have redness, warmth, swelling, or pain at the puncture site.  You have drainage (other than a small amount of blood on the dressing).  `You have chills or a fever > 101.  Your arm becomes pale or dark, cool, tingly, or numb.  You develop chest pain, shortness of breath, feel faint or pass out.    You have heavy bleeding from the site, hold pressure on the site for 20 minutes.  If the bleeding stops, apply a fresh bandage and call your cardiologist.  However, if you        continue to have bleeding, call 911  and continue to apply pressure to the site.   You have any symptoms of a stroke.  Remember BE FAST  B-balance. Sudden trouble walking or loss of balance.  E-eyes.  Sudden changes in how you see or a sudden onset of a very bad headache.   F-face. Sudden weakness or loss of feeling of the face or facial droop on one side.   A-arms Sudden weakness or numbness in one arm.  One arm drifts down if they are both held out in front of you. This happens suddenly and usually on one side of the body.   S-speech.  Sudden trouble speaking, slurred speech or trouble understanding what are saying.   T-time  Time to call emergency services.  Write down the symptoms and the time they started.

## 2025-03-31 NOTE — INTERVAL H&P NOTE
Basically we will bring her in for right heart cath because on her echo she had severe pulmonary hypertension but she is not really complaining lots of shortness of breath I feel like probably this is going to be an artifact but the risk of the heart cath is low and I would change maybe what we did so I am going to go ahead and move forward with a right heart cath from the left arm.  H&P reviewed. The patient was examined and there are no changes to the H&P. I have explained the risks and benefits of the procedure to the patient.  The patient understands and agrees to proceed

## 2025-04-08 LAB
HCO3 BLDA-SCNC: 24.7 MMOL/L (ref 21–28)
HCT VFR BLDA CALC: 26 % (ref 38–51)
HGB BLDA-MCNC: 8.8 G/DL (ref 12–17)
PCO2 BLDV: 41.4 MMHG (ref 41–51)
PH BLDA: 7.38 [PH] (ref 7.31–7.41)
PO2 BLDV: 35 MM HG (ref 35–42)
POTASSIUM BLDA-SCNC: 4.3 MMOL/L (ref 3.5–4.9)
SAO2 % BLDCOA: 66 % (ref 45–75)

## 2025-04-10 ENCOUNTER — TELEPHONE (OUTPATIENT)
Dept: CARDIOLOGY | Age: 75
End: 2025-04-10
Payer: MEDICARE

## 2025-04-10 NOTE — TELEPHONE ENCOUNTER
Pt was last seen on 3/7/25 by Gely SRIVASTAVA    Placed clearance in your in box for review and signature

## 2025-04-25 ENCOUNTER — TELEPHONE (OUTPATIENT)
Dept: CARDIOLOGY | Age: 75
End: 2025-04-25

## 2025-04-25 ENCOUNTER — OFFICE VISIT (OUTPATIENT)
Dept: CARDIOLOGY | Age: 75
End: 2025-04-25
Payer: MEDICARE

## 2025-04-25 VITALS
WEIGHT: 174 LBS | DIASTOLIC BLOOD PRESSURE: 60 MMHG | BODY MASS INDEX: 28.99 KG/M2 | SYSTOLIC BLOOD PRESSURE: 126 MMHG | HEIGHT: 65 IN | HEART RATE: 74 BPM

## 2025-04-25 DIAGNOSIS — I27.20 PULMONARY HYPERTENSION: Primary | ICD-10-CM

## 2025-04-25 DIAGNOSIS — I48.19 PERSISTENT ATRIAL FIBRILLATION: ICD-10-CM

## 2025-04-25 DIAGNOSIS — I10 PRIMARY HYPERTENSION: ICD-10-CM

## 2025-04-25 NOTE — TELEPHONE ENCOUNTER
Caller: Baltazar MELO    Relationship to patient: Self    Best call back number: [phone]     Patient is needing: PT CALLING TO SCHED 6 MO FU IN OCT W/ DR TURNER. NO OPENINGS TIL DEC PLS CALL TO SCHEDULE.

## 2025-04-25 NOTE — PROGRESS NOTES
Date of Office Visit: 2025  Encounter Provider: CAROLA Duff  Place of Service: [facility] CARDIOLOGY  Patient Name: Baltazar MELO  :1950    Chief Complaint   Patient presents with    Atrial Fibrillation   :     HPI: Baltazar MELO is a 75 y.o. female patient of  Dr. Farah's with hypertension, hyperlipidemia, mitral regurgitation, and atrial fibrillation.  She is no longer anticoagulated secondary to anemia.  She also has CKD.     On 3/7, I saw her for follow-up.  She was stressed about a recent abnormal mammogram.  I recommended proceeding with a repeat echocardiogram for reassessment of the mitral regurgitation.  On 3 4/19, this demonstrated normal LV function, mild aortic stenosis, and moderate tricuspid regurgitation with significant pulmonary hypertension.  Subsequently she was referred for a right heart catheterization which demonstrated severe pulmonary hypertension in the setting of an elevated wedge pressure and prominent V waves.  Dr. Farah recommended blood pressure control and diuresis.  She was started on Lasix and losartan.      Subsequently, she was diagnosed with DCIS.  On 4/15, she underwent a left breast lumpectomy.    She was struggling after her lumpectomy.  However, she is finally feeling stronger.  She denies any chest pain, shortness of breath, palpitations, edema, dizziness, or syncope.  Reportedly her primary care doctor discontinue losartan secondary to a GFR less than 30.  The amlodipine was increased instead.  Additionally, she was advised to discuss the Lasix with us.    Past Medical History:   Diagnosis Date    A-fib     Anemia in stage 4 chronic kidney disease     Arthritis     Cancer     Creatinine elevation     Diabetes mellitus     Elevated cholesterol     Health care maintenance     Hyperlipidemia     Hypertension     Obesity     Orthostatic dizziness 2023    PVC (premature ventricular contraction)        Past  Surgical History:   Procedure Laterality Date    BACK SURGERY      BREAST CYST EXCISION Left     BREAST LUMPECTOMY Right     BREAST SURGERY Bilateral     Reduction    CARDIAC CATHETERIZATION N/A 3/31/2025    Procedure: Right Heart Cath;  Surgeon: Harjit Farah MD;  Location: [facility] CATH INVASIVE LOCATION;  Service: Cardiology;  Laterality: N/A;    CHOLECYSTECTOMY      HYSTERECTOMY      LUMBAR DISCECTOMY Left 2019    Procedure: Left Lumbar Three to Lumbar Four and Lumbar Four to Lumbar Five Decompression;  Surgeon: Abhijit Robertson MD;  Location: [facility] MAIN OR;  Service: Neurosurgery       Social History     Socioeconomic History    Marital status:    Tobacco Use    Smoking status: Former     Current packs/day: 0.00     Average packs/day: 0.3 packs/day for 0.5 years (0.1 ttl pk-yrs)     Types: Cigarettes     Start date: 1971     Quit date: 1972     Years since quittin.3    Smokeless tobacco: Never    Tobacco comments:     NO CAFFEINE USE   Vaping Use    Vaping status: Never Used   Substance and Sexual Activity    Alcohol use: No    Drug use: No    Sexual activity: Defer       Family History   Problem Relation Age of Onset    Heart disease Father     Cancer Mother     No Known Problems Maternal Grandmother     No Known Problems Maternal Grandfather     No Known Problems Paternal Grandmother     No Known Problems Paternal Grandfather        Review of Systems   Constitutional: Negative.   Cardiovascular: Negative.  Negative for chest pain, dyspnea on exertion, leg swelling, orthopnea, paroxysmal nocturnal dyspnea and syncope.   Respiratory: Negative.     Hematologic/Lymphatic: Negative for bleeding problem.   Musculoskeletal:  Negative for falls.   Gastrointestinal:  Negative for melena.   Neurological:  Negative for dizziness and light-headedness.       Allergies   Allergen Reactions    Cat Dander Other (See Comments) and Shortness Of Breath    Adhesive Tape Dermatitis and Other (See  Comments)     Blisters    Cefaclor Dizziness    Ibuprofen Other (See Comments)     Kidney dysfunction     Penicillins Dizziness    Cats Claw (Uncaria Tomentosa) Other (See Comments)    Levofloxacin Other (See Comments)    Losartan Diarrhea     Diarrhea (higher dose)    Vitamin C Other (See Comments)    Ascorbic Acid & Derivatives Other (See Comments)     Epistaxis    Latex Rash         Current Outpatient Medications:     ACCU-CHEK LEE PLUS test strip, USE 1 STRIP D AS INSTRUCTED, Disp: , Rfl: 5    acetaminophen (TYLENOL) 325 MG tablet, Take 3 tablets by mouth., Disp: , Rfl:     amLODIPine (NORVASC) 2.5 MG tablet, Take 2 tablets by mouth Daily., Disp: , Rfl:     anastrozole (ARIMIDEX) 1 MG tablet, , Disp: , Rfl:     atorvastatin (LIPITOR) 10 MG tablet, Take 1 tablet by mouth Daily., Disp: , Rfl:     B-D UF III MINI PEN NEEDLES 31G X 5 MM misc, INJECTING QID, Disp: , Rfl: 0    BIOTIN PO, Take 1 tablet by mouth Daily., Disp: , Rfl:     calcium carbonate (OS-MAYCOL) 600 MG tablet, Take 1 tablet by mouth 2 (Two) Times a Day., Disp: , Rfl:     carvedilol (COREG) 25 MG tablet, Take 1 tablet by mouth 2 (Two) Times a Day With Meals., Disp: , Rfl:     Cholecalciferol (Vitamin D3) 125 MCG (5000 UT) tablet dispersible, Take 5,000 Units by mouth Daily., Disp: , Rfl:     Ferrous Fumarate (IRON) 18 MG tablet controlled-release, Take  by mouth 3 (Three) Times a Day., Disp: , Rfl:     fluticasone (FLONASE) 50 MCG/ACT nasal spray, Administer 1 spray into the nostril(s) as directed by provider Daily., Disp: , Rfl:     folic acid (FOLVITE) 800 MCG tablet, Take  by mouth Every Other Day., Disp: , Rfl:     glucose blood test strip, 1 each by Other route Daily., Disp: , Rfl:     hydrALAZINE (APRESOLINE) 100 MG tablet, Take 1 tablet by mouth 3 (Three) Times a Day., Disp: , Rfl:     Iron, Ferrous Sulfate, 325 (65 Fe) MG tablet, Take 65 mg by mouth Every Other Day., Disp: , Rfl:     Lactobacillus Rhamnosus, GG, (PROBIOTIC COLIC) liquid, Take   "by mouth., Disp: , Rfl:     Multiple Vitamins-Minerals (MULTIVITAMIN PO), Take 1 tablet by mouth Daily., Disp: , Rfl:     ONETOUCH DELICA LANCETS 33G misc, USE AS DIRECTED TO CHECK BLOOD SUGAR ONCE DAILY, Disp: , Rfl:     Polyethyl Glyc-Propyl Glyc PF (Systane Preservative Free) 0.4-0.3 % solution ophthalmic solution, Administer 1 drop to both eyes 2 (Two) Times a Day., Disp: , Rfl:     polyethylene glycol (MIRALAX) pack packet, Take 17 g by mouth Daily., Disp: , Rfl:     sennosides-docusate sodium (SENOKOT-S) 8.6-50 MG tablet, Take 2 tablets by mouth Every Night., Disp: , Rfl:     sodium bicarbonate 650 MG tablet, Take 1 tablet by mouth Daily., Disp: , Rfl:     traMADol (ULTRAM) 50 MG tablet, Take 1 tablet by mouth Every 6 (Six) Hours As Needed for Moderate Pain., Disp: , Rfl:     vitamin B-12 (CYANOCOBALAMIN) 1000 MCG tablet, Take 1 tablet by mouth Daily., Disp: , Rfl:       Objective:     Vitals:    04/25/25 1329   BP: 126/60   Pulse: 74   Weight: 78.9 kg (174 lb)   Height: 165.1 cm (65\")     Body mass index is 28.96 kg/m².    PHYSICAL EXAM:    Neck:      Vascular: No JVD.   Pulmonary:      Effort: Pulmonary effort is normal.      Breath sounds: Normal breath sounds.   Cardiovascular:      Normal rate. Regular rhythm.      Murmurs: There is a systolic murmur.      No gallop.  No click. No rub.   Pulses:     Intact distal pulses.             Assessment:       Diagnosis Plan   1. Pulmonary hypertension        2. Persistent atrial fibrillation        3. Primary hypertension          No orders of the defined types were placed in this encounter.         Plan:       1.  Pulmonary hypertension.  Right heart catheterization demonstrated severe pulmonary hypertension in the setting of an elevated wedge pressure.  We are going to focus on blood pressure control.  Discussed diuresis with Dr. Farah.  Secondary to her renal insufficiency, recommended discontinuing the Lasix.  Fortunately she is asymptomatic.      2.  " Permanent atrial fibrillation.  She is rate controlled with carvedilol.  She is not anticoagulated secondary to anemia.      3.  Hypertension.  Her blood pressure looks good today.  Continue amlodipine and hydralazine.      Overall I think she is doing well.  She will follow-up with Dr. Farah in 6 months      As always, it has been a pleasure to participate in your patient's care.      Sincerely,         CAROLA Echeverria

## 2025-04-29 NOTE — TELEPHONE ENCOUNTER
Patient scheduled    Drysol Pregnancy And Lactation Text: This medication is considered safe during pregnancy and breast feeding.

## 2025-05-27 LAB
CYTO UR: NORMAL
LAB AP CASE REPORT: NORMAL
LAB AP SPECIAL STAINS: NORMAL
LAB AP SYNOPTIC CHECKLIST: NORMAL
PATH REPORT.ADDENDUM SPEC: NORMAL
PATH REPORT.FINAL DX SPEC: NORMAL
PATH REPORT.GROSS SPEC: NORMAL

## 2025-07-03 ENCOUNTER — TRANSCRIBE ORDERS (OUTPATIENT)
Dept: ADMINISTRATIVE | Facility: HOSPITAL | Age: 75
End: 2025-07-03
Payer: MEDICARE

## 2025-07-03 DIAGNOSIS — D64.9 ANEMIA, UNSPECIFIED TYPE: Primary | ICD-10-CM

## 2025-07-03 DIAGNOSIS — N18.9 CHRONIC KIDNEY DISEASE, UNSPECIFIED CKD STAGE: ICD-10-CM

## 2025-07-07 ENCOUNTER — HOSPITAL ENCOUNTER (OUTPATIENT)
Dept: INFUSION THERAPY | Facility: HOSPITAL | Age: 75
Discharge: HOME | End: 2025-07-07
Payer: MEDICARE

## 2025-07-07 VITALS
TEMPERATURE: 98.2 F | HEART RATE: 65 BPM | SYSTOLIC BLOOD PRESSURE: 154 MMHG | DIASTOLIC BLOOD PRESSURE: 65 MMHG | OXYGEN SATURATION: 100 % | RESPIRATION RATE: 18 BRPM

## 2025-07-07 DIAGNOSIS — D64.9 ANEMIA, UNSPECIFIED TYPE: Primary | ICD-10-CM

## 2025-07-07 DIAGNOSIS — N18.9 CHRONIC KIDNEY DISEASE, UNSPECIFIED CKD STAGE: ICD-10-CM

## 2025-07-07 LAB
ABO GROUP BLD: NORMAL
BLD GP AB SCN SERPL QL: NEGATIVE
HCT VFR BLD AUTO: 22.2 % (ref 34–46.6)
HGB BLD-MCNC: 6.5 G/DL (ref 12–15.9)
RH BLD: POSITIVE
T&S EXPIRATION DATE: NORMAL

## 2025-07-07 PROCEDURE — 86900 BLOOD TYPING SEROLOGIC ABO: CPT | Performed by: INTERNAL MEDICINE

## 2025-07-07 PROCEDURE — 36415 COLL VENOUS BLD VENIPUNCTURE: CPT

## 2025-07-07 PROCEDURE — 36430 TRANSFUSION BLD/BLD COMPNT: CPT

## 2025-07-07 PROCEDURE — 86920 COMPATIBILITY TEST SPIN: CPT

## 2025-07-07 PROCEDURE — 85018 HEMOGLOBIN: CPT | Performed by: INTERNAL MEDICINE

## 2025-07-07 PROCEDURE — 63710000001 DIPHENHYDRAMINE PER 50 MG: Performed by: NURSE PRACTITIONER

## 2025-07-07 PROCEDURE — 85014 HEMATOCRIT: CPT | Performed by: INTERNAL MEDICINE

## 2025-07-07 PROCEDURE — 86901 BLOOD TYPING SEROLOGIC RH(D): CPT | Performed by: INTERNAL MEDICINE

## 2025-07-07 PROCEDURE — P9016 RBC LEUKOCYTES REDUCED: HCPCS

## 2025-07-07 PROCEDURE — 86850 RBC ANTIBODY SCREEN: CPT | Performed by: INTERNAL MEDICINE

## 2025-07-07 PROCEDURE — 86900 BLOOD TYPING SEROLOGIC ABO: CPT

## 2025-07-07 RX ORDER — DIPHENHYDRAMINE HCL 25 MG
25 CAPSULE ORAL ONCE
Status: COMPLETED | OUTPATIENT
Start: 2025-07-07 | End: 2025-07-07

## 2025-07-07 RX ORDER — HYDROCODONE BITARTRATE AND ACETAMINOPHEN 5; 325 MG/1; MG/1
1-2 TABLET ORAL EVERY 6 HOURS PRN
COMMUNITY
Start: 2025-04-15

## 2025-07-07 RX ORDER — ACETAMINOPHEN 325 MG/1
650 TABLET ORAL ONCE
Status: COMPLETED | OUTPATIENT
Start: 2025-07-07 | End: 2025-07-07

## 2025-07-07 RX ORDER — ACETAMINOPHEN 500 MG
500 TABLET ORAL EVERY 6 HOURS PRN
COMMUNITY

## 2025-07-07 RX ORDER — ESCITALOPRAM OXALATE 5 MG/1
5 TABLET ORAL DAILY
COMMUNITY
Start: 2025-06-16

## 2025-07-07 RX ADMIN — DIPHENHYDRAMINE HYDROCHLORIDE 25 MG: 25 CAPSULE ORAL at 10:26

## 2025-07-07 RX ADMIN — ACETAMINOPHEN 650 MG: 325 TABLET ORAL at 10:26

## 2025-07-07 NOTE — PROGRESS NOTES
0913 - Left message at Dr. Nichols's office for return call, spoke with Kristie. Patient reported on arrival she may need pre-meds prior to blood transfusion. Patient has not received blood previously.  0956 - Today's Hgb result received from Lauren in lab.  1000 - Return call received from CAROLA Serrano. Orders received and in Epic.   Patient updated, verbalized understanding.

## 2025-07-08 LAB
BH BB BLOOD EXPIRATION DATE: NORMAL
BH BB BLOOD EXPIRATION DATE: NORMAL
BH BB BLOOD TYPE BARCODE: 5100
BH BB BLOOD TYPE BARCODE: 5100
BH BB DISPENSE STATUS: NORMAL
BH BB DISPENSE STATUS: NORMAL
BH BB PRODUCT CODE: NORMAL
BH BB PRODUCT CODE: NORMAL
BH BB UNIT NUMBER: NORMAL
BH BB UNIT NUMBER: NORMAL
CROSSMATCH INTERPRETATION: NORMAL
CROSSMATCH INTERPRETATION: NORMAL
UNIT  ABO: NORMAL
UNIT  ABO: NORMAL
UNIT  RH: NORMAL
UNIT  RH: NORMAL

## 2025-07-16 ENCOUNTER — TRANSCRIBE ORDERS (OUTPATIENT)
Dept: ADMINISTRATIVE | Facility: HOSPITAL | Age: 75
End: 2025-07-16
Payer: MEDICARE

## 2025-07-16 DIAGNOSIS — D63.1 ANEMIA IN CHRONIC KIDNEY DISEASE (CODE): Primary | ICD-10-CM

## 2025-07-16 DIAGNOSIS — N18.4 CHRONIC KIDNEY DISEASE, STAGE IV (SEVERE): ICD-10-CM

## 2025-07-24 ENCOUNTER — HOSPITAL ENCOUNTER (OUTPATIENT)
Dept: INFUSION THERAPY | Facility: HOSPITAL | Age: 75
Discharge: HOME | End: 2025-07-24
Admitting: NURSE PRACTITIONER
Payer: MEDICARE

## 2025-07-24 VITALS
SYSTOLIC BLOOD PRESSURE: 157 MMHG | OXYGEN SATURATION: 100 % | TEMPERATURE: 97.1 F | RESPIRATION RATE: 18 BRPM | HEART RATE: 63 BPM | DIASTOLIC BLOOD PRESSURE: 66 MMHG

## 2025-07-24 DIAGNOSIS — D63.8 ANEMIA, CHRONIC DISEASE: Primary | ICD-10-CM

## 2025-07-24 PROCEDURE — 96374 THER/PROPH/DIAG INJ IV PUSH: CPT

## 2025-07-24 PROCEDURE — 63710000001 DIPHENHYDRAMINE PER 50 MG: Performed by: NURSE PRACTITIONER

## 2025-07-24 PROCEDURE — 25010000002 FERUMOXYTOL 510 MG/17ML SOLUTION 17 ML VIAL: Performed by: NURSE PRACTITIONER

## 2025-07-24 RX ORDER — DIPHENHYDRAMINE HCL 25 MG
25 CAPSULE ORAL ONCE
Status: COMPLETED | OUTPATIENT
Start: 2025-07-24 | End: 2025-07-24

## 2025-07-24 RX ORDER — ACETAMINOPHEN 325 MG/1
650 TABLET ORAL ONCE
Status: COMPLETED | OUTPATIENT
Start: 2025-07-24 | End: 2025-07-24

## 2025-07-24 RX ADMIN — ACETAMINOPHEN 650 MG: 325 TABLET, FILM COATED ORAL at 16:02

## 2025-07-24 RX ADMIN — DIPHENHYDRAMINE HYDROCHLORIDE 25 MG: 25 CAPSULE ORAL at 16:02

## 2025-07-24 RX ADMIN — FERUMOXYTOL 510 MG: 510 INJECTION INTRAVENOUS at 16:25

## 2025-07-24 NOTE — PROGRESS NOTES
Pt states she was told tylenol and benadryl would be ordered prior to iron infusion.  No written orders from office.  [facility] called at 1505 and meds requested.  Waiting for return call.  Office returned call at 1520 and will have Della SRIVASTAVA call unit with orders. Office called again at 1545.

## 2025-07-28 RX ORDER — ACETAMINOPHEN 325 MG/1
650 TABLET ORAL EVERY 6 HOURS PRN
Status: CANCELLED
Start: 2025-07-28

## 2025-07-28 RX ORDER — DIPHENHYDRAMINE HCL 25 MG
25 CAPSULE ORAL ONCE
Status: CANCELLED
Start: 2025-07-28 | End: 2025-07-28

## 2025-07-31 ENCOUNTER — HOSPITAL ENCOUNTER (OUTPATIENT)
Dept: INFUSION THERAPY | Facility: HOSPITAL | Age: 75
Discharge: HOME | End: 2025-07-31
Admitting: NURSE PRACTITIONER
Payer: MEDICARE

## 2025-07-31 VITALS
RESPIRATION RATE: 18 BRPM | TEMPERATURE: 96.9 F | SYSTOLIC BLOOD PRESSURE: 149 MMHG | HEART RATE: 58 BPM | DIASTOLIC BLOOD PRESSURE: 60 MMHG | OXYGEN SATURATION: 100 %

## 2025-07-31 DIAGNOSIS — D63.8 ANEMIA, CHRONIC DISEASE: Primary | ICD-10-CM

## 2025-07-31 PROCEDURE — 25010000002 FERUMOXYTOL 510 MG/17ML SOLUTION 17 ML VIAL: Performed by: NURSE PRACTITIONER

## 2025-07-31 PROCEDURE — 63710000001 DIPHENHYDRAMINE PER 50 MG: Performed by: NURSE PRACTITIONER

## 2025-07-31 PROCEDURE — 96374 THER/PROPH/DIAG INJ IV PUSH: CPT

## 2025-07-31 RX ORDER — ACETAMINOPHEN 325 MG/1
650 TABLET ORAL EVERY 6 HOURS PRN
Status: CANCELLED
Start: 2025-08-03

## 2025-07-31 RX ORDER — ACETAMINOPHEN 325 MG/1
650 TABLET ORAL EVERY 6 HOURS PRN
Status: DISCONTINUED | OUTPATIENT
Start: 2025-07-31 | End: 2025-08-02 | Stop reason: HOSPADM

## 2025-07-31 RX ORDER — DIPHENHYDRAMINE HCL 25 MG
25 CAPSULE ORAL ONCE
Status: CANCELLED
Start: 2025-08-03 | End: 2025-08-03

## 2025-07-31 RX ORDER — DIPHENHYDRAMINE HCL 25 MG
25 CAPSULE ORAL ONCE
Status: COMPLETED | OUTPATIENT
Start: 2025-07-31 | End: 2025-07-31

## 2025-07-31 RX ADMIN — ACETAMINOPHEN 650 MG: 325 TABLET, FILM COATED ORAL at 09:04

## 2025-07-31 RX ADMIN — DIPHENHYDRAMINE HYDROCHLORIDE 25 MG: 25 CAPSULE ORAL at 09:04

## 2025-07-31 RX ADMIN — FERUMOXYTOL 510 MG: 510 INJECTION INTRAVENOUS at 09:33

## (undated) DEVICE — SHLD ANGIO 2LAYR CIR FEN

## (undated) DEVICE — Device

## (undated) DEVICE — SUT MNCRYL PLS ANTIB UD 4/0 PS2 18IN

## (undated) DEVICE — GLV SURG SENSICARE MICRO PF LF 8 STRL

## (undated) DEVICE — DRP MICROSCOPE 4 BINOCULAR CV 54X150IN

## (undated) DEVICE — ADHS SKIN DERMABOND TOP ADVANCED

## (undated) DEVICE — BALN PRESS WEDGE 5F 110CM

## (undated) DEVICE — 3.0MM PRECISION NEURO (MATCH HEAD)

## (undated) DEVICE — CONN TBG Y 5 IN 1 LF STRL

## (undated) DEVICE — DISPOSABLE 9450003 ELECTRO TWST PAIR 8CH

## (undated) DEVICE — DRP SLUSH WARMR MACH 52X66IN OM-ORS-301

## (undated) DEVICE — PK NEURO SPINE 40

## (undated) DEVICE — PK CATH CARD 40

## (undated) DEVICE — GLIDESHEATH BASIC HYDROPHILIC COATED INTRODUCER SHEATH: Brand: GLIDESHEATH

## (undated) DEVICE — ANTIBACTERIAL UNDYED BRAIDED (POLYGLACTIN 910), SYNTHETIC ABSORBABLE SUTURE: Brand: COATED VICRYL

## (undated) DEVICE — DRP C/ARM 41X74IN

## (undated) DEVICE — SMOKE EVACUATION TUBING WITH 7/8 IN TO 1/4 IN REDUCER: Brand: BUFFALO FILTER

## (undated) DEVICE — TOWEL,OR,DSP,ST,WHITE,DLX,4/PK,20PK/CS: Brand: MEDLINE

## (undated) DEVICE — GLV SURG BIOGEL LTX PF 7 1/2

## (undated) DEVICE — GOWN,PREVENTION PLUS,XXLARGE,STERILE: Brand: MEDLINE

## (undated) DEVICE — SUT PROLN 8/0 BV130/5 D/A 24IN 8732H

## (undated) DEVICE — APPL CHLORAPREP W/TINT 26ML ORNG

## (undated) DEVICE — CODMAN® SURGICAL PATTIES 3/4" X 3/4" (1.91CM X 1.91CM): Brand: CODMAN®

## (undated) DEVICE — NDL SPINE 20G 3 1/2 YEL STRL 1P/U

## (undated) DEVICE — NDL HYPO PRECISIONGLIDE REG 25G 1 1/2

## (undated) DEVICE — SYR LUERLOK 20CC BX/50

## (undated) DEVICE — GLV SURG PREMIERPRO ORTHO LTX PF SZ8.5 BRN

## (undated) DEVICE — GW INQWIRE FC PTFE J/3MM .021 180

## (undated) DEVICE — THE FLOSEAL MALLEABLE TIP AND TRIMMABLE TIP ARE INTENDED FOR DELIVERY OF FLOSEAL HEMOSTATIC MATRIX.: Brand: FLOSEAL SPECIAL APPLICATOR TIPS

## (undated) DEVICE — 1010 S-DRAPE TOWEL DRAPE 10/BX: Brand: STERI-DRAPE™